# Patient Record
Sex: FEMALE | Race: WHITE | Employment: OTHER | URBAN - METROPOLITAN AREA
[De-identification: names, ages, dates, MRNs, and addresses within clinical notes are randomized per-mention and may not be internally consistent; named-entity substitution may affect disease eponyms.]

---

## 2018-08-29 ENCOUNTER — HOSPITAL ENCOUNTER (OUTPATIENT)
Age: 68
Setting detail: OUTPATIENT SURGERY
Discharge: HOME | End: 2018-08-29
Attending: INTERNAL MEDICINE | Admitting: INTERNAL MEDICINE
Payer: MEDICARE

## 2018-08-29 ENCOUNTER — ANESTHESIA (OUTPATIENT)
Dept: ENDOSCOPY | Age: 68
End: 2018-08-29
Payer: MEDICARE

## 2018-08-29 ENCOUNTER — ANESTHESIA EVENT (OUTPATIENT)
Dept: ENDOSCOPY | Age: 68
End: 2018-08-29
Payer: MEDICARE

## 2018-08-29 VITALS
OXYGEN SATURATION: 100 % | BODY MASS INDEX: 31.24 KG/M2 | HEART RATE: 56 BPM | WEIGHT: 183 LBS | SYSTOLIC BLOOD PRESSURE: 114 MMHG | TEMPERATURE: 98 F | RESPIRATION RATE: 16 BRPM | HEIGHT: 64 IN | DIASTOLIC BLOOD PRESSURE: 50 MMHG

## 2018-08-29 VITALS
RESPIRATION RATE: 17 BRPM | SYSTOLIC BLOOD PRESSURE: 119 MMHG | DIASTOLIC BLOOD PRESSURE: 52 MMHG | OXYGEN SATURATION: 98 %

## 2018-08-29 LAB
GLUCOSE BLD-MCNC: 114 MG/DL (ref 70–99)
PERFORMED ON: ABNORMAL

## 2018-08-29 PROCEDURE — 2720000010 HC SURG SUPPLY STERILE: Performed by: INTERNAL MEDICINE

## 2018-08-29 PROCEDURE — 2580000003 HC RX 258: Performed by: NURSE ANESTHETIST, CERTIFIED REGISTERED

## 2018-08-29 PROCEDURE — 3700000000 HC ANESTHESIA ATTENDED CARE: Performed by: INTERNAL MEDICINE

## 2018-08-29 PROCEDURE — 3609014100 HC ENTEROSCOPY > 2ND PRTN W/CONTROL BLEEDING: Performed by: INTERNAL MEDICINE

## 2018-08-29 PROCEDURE — 6360000002 HC RX W HCPCS: Performed by: NURSE ANESTHETIST, CERTIFIED REGISTERED

## 2018-08-29 PROCEDURE — 7100000011 HC PHASE II RECOVERY - ADDTL 15 MIN: Performed by: INTERNAL MEDICINE

## 2018-08-29 PROCEDURE — 2500000003 HC RX 250 WO HCPCS: Performed by: NURSE ANESTHETIST, CERTIFIED REGISTERED

## 2018-08-29 PROCEDURE — 7100000010 HC PHASE II RECOVERY - FIRST 15 MIN: Performed by: INTERNAL MEDICINE

## 2018-08-29 PROCEDURE — 3700000001 HC ADD 15 MINUTES (ANESTHESIA): Performed by: INTERNAL MEDICINE

## 2018-08-29 PROCEDURE — 3609013900 HC ENTEROSCOPY: Performed by: INTERNAL MEDICINE

## 2018-08-29 RX ORDER — ERGOCALCIFEROL 1.25 MG/1
50000 CAPSULE ORAL WEEKLY
COMMUNITY

## 2018-08-29 RX ORDER — SODIUM CHLORIDE, SODIUM LACTATE, POTASSIUM CHLORIDE, CALCIUM CHLORIDE 600; 310; 30; 20 MG/100ML; MG/100ML; MG/100ML; MG/100ML
INJECTION, SOLUTION INTRAVENOUS CONTINUOUS PRN
Status: DISCONTINUED | OUTPATIENT
Start: 2018-08-29 | End: 2018-08-29 | Stop reason: SDUPTHER

## 2018-08-29 RX ORDER — FENTANYL CITRATE 50 UG/ML
INJECTION, SOLUTION INTRAMUSCULAR; INTRAVENOUS PRN
Status: DISCONTINUED | OUTPATIENT
Start: 2018-08-29 | End: 2018-08-29 | Stop reason: SDUPTHER

## 2018-08-29 RX ORDER — CEPHALEXIN 250 MG/1
250 CAPSULE ORAL 4 TIMES DAILY
Qty: 28 CAPSULE | Refills: 0 | Status: SHIPPED | OUTPATIENT
Start: 2018-08-29 | End: 2018-09-05

## 2018-08-29 RX ORDER — M-VIT,TX,IRON,MINS/CALC/FOLIC 27MG-0.4MG
1 TABLET ORAL DAILY
COMMUNITY

## 2018-08-29 RX ORDER — PROBENECID AND COLCHICINE 500; .5 MG/1; MG/1
1 TABLET ORAL DAILY
COMMUNITY

## 2018-08-29 RX ORDER — LIDOCAINE HYDROCHLORIDE 20 MG/ML
INJECTION, SOLUTION INFILTRATION; PERINEURAL PRN
Status: DISCONTINUED | OUTPATIENT
Start: 2018-08-29 | End: 2018-08-29 | Stop reason: SDUPTHER

## 2018-08-29 RX ORDER — TRAZODONE HYDROCHLORIDE 50 MG/1
50 TABLET ORAL NIGHTLY
COMMUNITY

## 2018-08-29 RX ORDER — PROPOFOL 10 MG/ML
INJECTION, EMULSION INTRAVENOUS CONTINUOUS PRN
Status: DISCONTINUED | OUTPATIENT
Start: 2018-08-29 | End: 2018-08-29 | Stop reason: SDUPTHER

## 2018-08-29 RX ORDER — FUROSEMIDE 80 MG
80 TABLET ORAL 2 TIMES DAILY
COMMUNITY

## 2018-08-29 RX ORDER — FEBUXOSTAT 80 MG/1
TABLET, FILM COATED ORAL
COMMUNITY

## 2018-08-29 RX ORDER — FLUOXETINE HYDROCHLORIDE 20 MG/1
20 CAPSULE ORAL DAILY
COMMUNITY

## 2018-08-29 RX ORDER — SIMVASTATIN 10 MG
10 TABLET ORAL NIGHTLY
COMMUNITY

## 2018-08-29 RX ORDER — METOPROLOL SUCCINATE 25 MG/1
25 TABLET, EXTENDED RELEASE ORAL DAILY
COMMUNITY

## 2018-08-29 RX ORDER — METOLAZONE 2.5 MG/1
2.5 TABLET ORAL DAILY
COMMUNITY

## 2018-08-29 RX ADMIN — FENTANYL CITRATE 25 MCG: 50 INJECTION INTRAMUSCULAR; INTRAVENOUS at 11:53

## 2018-08-29 RX ADMIN — LIDOCAINE HYDROCHLORIDE 60 MG: 20 INJECTION, SOLUTION INFILTRATION; PERINEURAL at 11:53

## 2018-08-29 RX ADMIN — PROPOFOL 100 MCG/KG/MIN: 10 INJECTION, EMULSION INTRAVENOUS at 11:53

## 2018-08-29 RX ADMIN — SODIUM CHLORIDE, SODIUM LACTATE, POTASSIUM CHLORIDE, AND CALCIUM CHLORIDE: 600; 310; 30; 20 INJECTION, SOLUTION INTRAVENOUS at 11:48

## 2018-08-29 ASSESSMENT — PULMONARY FUNCTION TESTS
PIF_VALUE: 0

## 2018-08-29 ASSESSMENT — PAIN - FUNCTIONAL ASSESSMENT: PAIN_FUNCTIONAL_ASSESSMENT: 0-10

## 2018-08-29 NOTE — H&P
pillars visible  __________  Class II: Soft palate, uvula, fauces visible  ____x______   Class III: Soft palate, base of uvula visible  __________  Class IV: Hard palate only visible   __________      ASSESSMENT AND PLAN:    1. Patient is a 70 y.o. female here for enteroscopy with deep sedation  2. Procedure options, risks and benefits reviewed with patient. We specifically discussed that risks include, but are not limited to infection, bleeding, perforation, death, and missed lesions. Patient expresses understanding.

## 2018-08-29 NOTE — ANESTHESIA PRE PROCEDURE
Department of Anesthesiology  Preprocedure Note       Name:  Mariya Thayer   Age:  70 y.o.  :  1950                                          MRN:  [id number]         Date:  2018      Surgeon: Lena Darnell):  Reggie Galeas MD    Procedure: Procedure(s):  PUSH ENTEROSCOPY WITH APC, MAC ANESTHESIA    Medications prior to admission:   Prior to Admission medications    Not on File       Current medications:    No current facility-administered medications for this encounter. Allergies: Allergies   Allergen Reactions    Codeine Nausea And Vomiting       Problem List:  There is no problem list on file for this patient. Past Medical History:  No past medical history on file. Past Surgical History:  No past surgical history on file. Social History:    Social History   Substance Use Topics    Smoking status: Not on file    Smokeless tobacco: Not on file    Alcohol use Not on file                                Counseling given: Not Answered      Vital Signs (Current): There were no vitals filed for this visit. BP Readings from Last 3 Encounters:   No data found for BP       NPO Status:                                                                                 BMI:   Wt Readings from Last 3 Encounters:   No data found for Wt     There is no height or weight on file to calculate BMI.    CBC: No results found for: WBC, RBC, HGB, HCT, MCV, RDW, PLT    CMP: No results found for: NA, K, CL, CO2, BUN, CREATININE, GFRAA, AGRATIO, LABGLOM, GLUCOSE, PROT, CALCIUM, BILITOT, ALKPHOS, AST, ALT    POC Tests: No results for input(s): POCGLU, POCNA, POCK, POCCL, POCBUN, POCHEMO, POCHCT in the last 72 hours.     Coags: No results found for: PROTIME, INR, APTT    HCG (If Applicable): No results found for: PREGTESTUR, PREGSERUM, HCG, HCGQUANT     ABGs: No results found for: PHART, PO2ART, YBE5IBQ, CAI3BDI, BEART, S6RKDWUW     Type & Screen (If

## 2018-08-29 NOTE — PROGRESS NOTES
Swelling of left foot to mid calf with  Redness from bunyon and foot up to mid espinoza area. Camacho in Endoscopy aware and will inform Dr Moreno.

## 2023-05-04 ENCOUNTER — HOSPITAL ENCOUNTER (INPATIENT)
Age: 73
LOS: 3 days | Discharge: HOME | DRG: 811 | End: 2023-05-07
Attending: INTERNAL MEDICINE | Admitting: INTERNAL MEDICINE
Payer: MEDICARE

## 2023-05-04 PROCEDURE — 0DJ08ZZ INSPECTION OF UPPER INTESTINAL TRACT, VIA NATURAL OR ARTIFICIAL OPENING ENDOSCOPIC: ICD-10-PCS | Performed by: INTERNAL MEDICINE

## 2023-05-04 PROCEDURE — 2060000000 HC ICU INTERMEDIATE R&B

## 2023-05-04 ASSESSMENT — PAIN DESCRIPTION - DESCRIPTORS: DESCRIPTORS: ACHING;PENETRATING

## 2023-05-04 ASSESSMENT — PAIN DESCRIPTION - ORIENTATION: ORIENTATION: MID

## 2023-05-04 ASSESSMENT — PAIN DESCRIPTION - PAIN TYPE: TYPE: ACUTE PAIN

## 2023-05-04 ASSESSMENT — PAIN DESCRIPTION - FREQUENCY: FREQUENCY: CONTINUOUS

## 2023-05-04 ASSESSMENT — PAIN SCALES - GENERAL: PAINLEVEL_OUTOF10: 5

## 2023-05-04 ASSESSMENT — PAIN DESCRIPTION - ONSET: ONSET: ON-GOING

## 2023-05-04 ASSESSMENT — PAIN - FUNCTIONAL ASSESSMENT: PAIN_FUNCTIONAL_ASSESSMENT: PREVENTS OR INTERFERES SOME ACTIVE ACTIVITIES AND ADLS

## 2023-05-04 ASSESSMENT — PAIN DESCRIPTION - LOCATION: LOCATION: HEAD;CHEST

## 2023-05-05 ENCOUNTER — ANESTHESIA EVENT (OUTPATIENT)
Dept: ENDOSCOPY | Age: 73
End: 2023-05-05
Payer: MEDICARE

## 2023-05-05 ENCOUNTER — ANESTHESIA (OUTPATIENT)
Dept: ENDOSCOPY | Age: 73
End: 2023-05-05
Payer: MEDICARE

## 2023-05-05 PROBLEM — N18.5 CKD (CHRONIC KIDNEY DISEASE) STAGE 5, GFR LESS THAN 15 ML/MIN (HCC): Status: ACTIVE | Noted: 2023-05-05

## 2023-05-05 PROBLEM — K92.2 GASTROINTESTINAL HEMORRHAGE: Status: ACTIVE | Noted: 2023-05-05

## 2023-05-05 PROBLEM — E78.5 HLD (HYPERLIPIDEMIA): Status: ACTIVE | Noted: 2023-05-05

## 2023-05-05 PROBLEM — N18.6 ESRD (END STAGE RENAL DISEASE) (HCC): Status: ACTIVE | Noted: 2023-05-05

## 2023-05-05 PROBLEM — I10 HTN (HYPERTENSION): Status: ACTIVE | Noted: 2023-05-05

## 2023-05-05 PROBLEM — I50.30 NYHA CLASS 3 HEART FAILURE WITH PRESERVED EJECTION FRACTION (HCC): Status: ACTIVE | Noted: 2023-05-05

## 2023-05-05 PROBLEM — R07.9 CHEST PAIN: Status: ACTIVE | Noted: 2023-05-05

## 2023-05-05 PROBLEM — I25.10 CAD (CORONARY ARTERY DISEASE), NATIVE CORONARY ARTERY: Status: ACTIVE | Noted: 2023-05-05

## 2023-05-05 PROBLEM — E87.8 ELECTROLYTE IMBALANCE: Status: ACTIVE | Noted: 2023-05-05

## 2023-05-05 PROBLEM — J44.9 COPD (CHRONIC OBSTRUCTIVE PULMONARY DISEASE) (HCC): Status: ACTIVE | Noted: 2023-05-05

## 2023-05-05 LAB
ALBUMIN SERPL-MCNC: 2.9 G/DL (ref 3.4–5)
ALBUMIN/GLOB SERPL: 1.3 {RATIO} (ref 1.1–2.2)
ALP SERPL-CCNC: 109 U/L (ref 40–129)
ALT SERPL-CCNC: <5 U/L (ref 10–40)
ANION GAP SERPL CALCULATED.3IONS-SCNC: 12 MMOL/L (ref 3–16)
AST SERPL-CCNC: 11 U/L (ref 15–37)
BASOPHILS # BLD: 0 K/UL (ref 0–0.2)
BASOPHILS NFR BLD: 0.5 %
BILIRUB SERPL-MCNC: 0.3 MG/DL (ref 0–1)
BUN SERPL-MCNC: 59 MG/DL (ref 7–20)
CALCIUM SERPL-MCNC: 8.2 MG/DL (ref 8.3–10.6)
CHLORIDE SERPL-SCNC: 106 MMOL/L (ref 99–110)
CO2 SERPL-SCNC: 22 MMOL/L (ref 21–32)
CREAT SERPL-MCNC: 4.5 MG/DL (ref 0.6–1.2)
DEPRECATED RDW RBC AUTO: 18 % (ref 12.4–15.4)
EKG ATRIAL RATE: 45 BPM
EKG DIAGNOSIS: NORMAL
EKG P AXIS: 72 DEGREES
EKG P-R INTERVAL: 194 MS
EKG Q-T INTERVAL: 554 MS
EKG QRS DURATION: 142 MS
EKG QTC CALCULATION (BAZETT): 479 MS
EKG R AXIS: -60 DEGREES
EKG T AXIS: 130 DEGREES
EKG VENTRICULAR RATE: 45 BPM
EOSINOPHIL # BLD: 0.2 K/UL (ref 0–0.6)
EOSINOPHIL NFR BLD: 8.5 %
FERRITIN SERPL IA-MCNC: 38.3 NG/ML (ref 15–150)
GFR SERPLBLD CREATININE-BSD FMLA CKD-EPI: 10 ML/MIN/{1.73_M2}
GLUCOSE BLD-MCNC: 100 MG/DL (ref 70–99)
GLUCOSE BLD-MCNC: 107 MG/DL (ref 70–99)
GLUCOSE BLD-MCNC: 140 MG/DL (ref 70–99)
GLUCOSE SERPL-MCNC: 105 MG/DL (ref 70–99)
HCT VFR BLD AUTO: 25.1 % (ref 36–48)
HCT VFR BLD AUTO: 25.5 % (ref 36–48)
HCT VFR BLD AUTO: 25.8 % (ref 36–48)
HGB BLD-MCNC: 7.9 G/DL (ref 12–16)
HGB BLD-MCNC: 8 G/DL (ref 12–16)
IMMATURE RETIC FRACT: 0.45 (ref 0.21–0.37)
IRON SATN MFR SERPL: 8 % (ref 15–50)
IRON SERPL-MCNC: 22 UG/DL (ref 37–145)
LYMPHOCYTES # BLD: 0.3 K/UL (ref 1–5.1)
LYMPHOCYTES NFR BLD: 10 %
MAGNESIUM SERPL-MCNC: 2 MG/DL (ref 1.8–2.4)
MCH RBC QN AUTO: 27.7 PG (ref 26–34)
MCHC RBC AUTO-ENTMCNC: 31.2 G/DL (ref 31–36)
MCV RBC AUTO: 88.6 FL (ref 80–100)
MONOCYTES # BLD: 0.3 K/UL (ref 0–1.3)
MONOCYTES NFR BLD: 10.1 %
NEUTROPHILS # BLD: 1.8 K/UL (ref 1.7–7.7)
NEUTROPHILS NFR BLD: 70.9 %
NT-PROBNP SERPL-MCNC: ABNORMAL PG/ML (ref 0–124)
PERFORMED ON: ABNORMAL
PHOSPHATE SERPL-MCNC: 5.2 MG/DL (ref 2.5–4.9)
PLATELET # BLD AUTO: 149 K/UL (ref 135–450)
PMV BLD AUTO: 9.3 FL (ref 5–10.5)
POTASSIUM SERPL-SCNC: 4.7 MMOL/L (ref 3.5–5.1)
PROT SERPL-MCNC: 5.2 G/DL (ref 6.4–8.2)
RBC # BLD AUTO: 2.91 M/UL (ref 4–5.2)
RETICS # AUTO: 0.07 M/UL (ref 0.02–0.1)
RETICS/RBC NFR AUTO: 2.3 % (ref 0.5–2.18)
SODIUM SERPL-SCNC: 140 MMOL/L (ref 136–145)
TIBC SERPL-MCNC: 264 UG/DL (ref 260–445)
TROPONIN, HIGH SENSITIVITY: 100 NG/L (ref 0–14)
WBC # BLD AUTO: 2.6 K/UL (ref 4–11)

## 2023-05-05 PROCEDURE — 83540 ASSAY OF IRON: CPT

## 2023-05-05 PROCEDURE — 2580000003 HC RX 258: Performed by: INTERNAL MEDICINE

## 2023-05-05 PROCEDURE — 6360000002 HC RX W HCPCS

## 2023-05-05 PROCEDURE — 83880 ASSAY OF NATRIURETIC PEPTIDE: CPT

## 2023-05-05 PROCEDURE — 83735 ASSAY OF MAGNESIUM: CPT

## 2023-05-05 PROCEDURE — C9113 INJ PANTOPRAZOLE SODIUM, VIA: HCPCS

## 2023-05-05 PROCEDURE — 85018 HEMOGLOBIN: CPT

## 2023-05-05 PROCEDURE — 84100 ASSAY OF PHOSPHORUS: CPT

## 2023-05-05 PROCEDURE — 82728 ASSAY OF FERRITIN: CPT

## 2023-05-05 PROCEDURE — 93010 ELECTROCARDIOGRAM REPORT: CPT | Performed by: INTERNAL MEDICINE

## 2023-05-05 PROCEDURE — 2500000003 HC RX 250 WO HCPCS: Performed by: NURSE ANESTHETIST, CERTIFIED REGISTERED

## 2023-05-05 PROCEDURE — 2709999900 HC NON-CHARGEABLE SUPPLY: Performed by: INTERNAL MEDICINE

## 2023-05-05 PROCEDURE — 6370000000 HC RX 637 (ALT 250 FOR IP)

## 2023-05-05 PROCEDURE — 1200000000 HC SEMI PRIVATE

## 2023-05-05 PROCEDURE — 7100000010 HC PHASE II RECOVERY - FIRST 15 MIN: Performed by: INTERNAL MEDICINE

## 2023-05-05 PROCEDURE — 99223 1ST HOSP IP/OBS HIGH 75: CPT | Performed by: INTERNAL MEDICINE

## 2023-05-05 PROCEDURE — 6360000002 HC RX W HCPCS: Performed by: NURSE ANESTHETIST, CERTIFIED REGISTERED

## 2023-05-05 PROCEDURE — 85014 HEMATOCRIT: CPT

## 2023-05-05 PROCEDURE — 2720000010 HC SURG SUPPLY STERILE: Performed by: INTERNAL MEDICINE

## 2023-05-05 PROCEDURE — 83550 IRON BINDING TEST: CPT

## 2023-05-05 PROCEDURE — 7100000011 HC PHASE II RECOVERY - ADDTL 15 MIN: Performed by: INTERNAL MEDICINE

## 2023-05-05 PROCEDURE — 2580000003 HC RX 258: Performed by: NURSE ANESTHETIST, CERTIFIED REGISTERED

## 2023-05-05 PROCEDURE — 3700000000 HC ANESTHESIA ATTENDED CARE: Performed by: INTERNAL MEDICINE

## 2023-05-05 PROCEDURE — 85045 AUTOMATED RETICULOCYTE COUNT: CPT

## 2023-05-05 PROCEDURE — 84484 ASSAY OF TROPONIN QUANT: CPT

## 2023-05-05 PROCEDURE — 85025 COMPLETE CBC W/AUTO DIFF WBC: CPT

## 2023-05-05 PROCEDURE — 2580000003 HC RX 258

## 2023-05-05 PROCEDURE — 3609013000 HC EGD TRANSORAL CONTROL BLEEDING ANY METHOD: Performed by: INTERNAL MEDICINE

## 2023-05-05 PROCEDURE — 93005 ELECTROCARDIOGRAM TRACING: CPT | Performed by: INTERNAL MEDICINE

## 2023-05-05 PROCEDURE — 36415 COLL VENOUS BLD VENIPUNCTURE: CPT

## 2023-05-05 PROCEDURE — 80053 COMPREHEN METABOLIC PANEL: CPT

## 2023-05-05 RX ORDER — ALLOPURINOL 100 MG/1
100 TABLET ORAL 2 TIMES DAILY
COMMUNITY

## 2023-05-05 RX ORDER — ONDANSETRON 2 MG/ML
4 INJECTION INTRAMUSCULAR; INTRAVENOUS EVERY 6 HOURS PRN
Status: DISCONTINUED | OUTPATIENT
Start: 2023-05-05 | End: 2023-05-07 | Stop reason: HOSPADM

## 2023-05-05 RX ORDER — SODIUM CHLORIDE 0.9 % (FLUSH) 0.9 %
5-40 SYRINGE (ML) INJECTION PRN
Status: DISCONTINUED | OUTPATIENT
Start: 2023-05-05 | End: 2023-05-07 | Stop reason: HOSPADM

## 2023-05-05 RX ORDER — PROPOFOL 10 MG/ML
INJECTION, EMULSION INTRAVENOUS PRN
Status: DISCONTINUED | OUTPATIENT
Start: 2023-05-05 | End: 2023-05-05 | Stop reason: SDUPTHER

## 2023-05-05 RX ORDER — ACETAMINOPHEN 650 MG/1
650 SUPPOSITORY RECTAL EVERY 6 HOURS PRN
Status: DISCONTINUED | OUTPATIENT
Start: 2023-05-05 | End: 2023-05-07 | Stop reason: HOSPADM

## 2023-05-05 RX ORDER — TORSEMIDE 100 MG/1
100 TABLET ORAL DAILY
Status: ON HOLD | COMMUNITY
End: 2023-05-07 | Stop reason: SDUPTHER

## 2023-05-05 RX ORDER — SEVELAMER CARBONATE 800 MG/1
1 TABLET, FILM COATED ORAL
COMMUNITY

## 2023-05-05 RX ORDER — IPRATROPIUM BROMIDE AND ALBUTEROL SULFATE 2.5; .5 MG/3ML; MG/3ML
1 SOLUTION RESPIRATORY (INHALATION) EVERY 4 HOURS PRN
Status: DISCONTINUED | OUTPATIENT
Start: 2023-05-05 | End: 2023-05-07 | Stop reason: HOSPADM

## 2023-05-05 RX ORDER — TORSEMIDE 20 MG/1
20 TABLET ORAL DAILY
Status: DISCONTINUED | OUTPATIENT
Start: 2023-05-06 | End: 2023-05-07 | Stop reason: HOSPADM

## 2023-05-05 RX ORDER — GUAIFENESIN/DEXTROMETHORPHAN 100-10MG/5
5 SYRUP ORAL EVERY 4 HOURS PRN
Status: DISCONTINUED | OUTPATIENT
Start: 2023-05-05 | End: 2023-05-07 | Stop reason: HOSPADM

## 2023-05-05 RX ORDER — LIDOCAINE HYDROCHLORIDE 20 MG/ML
INJECTION, SOLUTION EPIDURAL; INFILTRATION; INTRACAUDAL; PERINEURAL PRN
Status: DISCONTINUED | OUTPATIENT
Start: 2023-05-05 | End: 2023-05-05 | Stop reason: SDUPTHER

## 2023-05-05 RX ORDER — ACETAMINOPHEN 325 MG/1
650 TABLET ORAL EVERY 6 HOURS PRN
Status: DISCONTINUED | OUTPATIENT
Start: 2023-05-05 | End: 2023-05-07 | Stop reason: HOSPADM

## 2023-05-05 RX ORDER — SODIUM CHLORIDE, SODIUM LACTATE, POTASSIUM CHLORIDE, CALCIUM CHLORIDE 600; 310; 30; 20 MG/100ML; MG/100ML; MG/100ML; MG/100ML
INJECTION, SOLUTION INTRAVENOUS ONCE
Status: COMPLETED | OUTPATIENT
Start: 2023-05-05 | End: 2023-05-05

## 2023-05-05 RX ORDER — ISOSORBIDE MONONITRATE 30 MG/1
60 TABLET, EXTENDED RELEASE ORAL DAILY
COMMUNITY

## 2023-05-05 RX ORDER — INSULIN LISPRO 100 [IU]/ML
0-4 INJECTION, SOLUTION INTRAVENOUS; SUBCUTANEOUS
Status: DISCONTINUED | OUTPATIENT
Start: 2023-05-05 | End: 2023-05-07 | Stop reason: HOSPADM

## 2023-05-05 RX ORDER — SODIUM CHLORIDE, SODIUM LACTATE, POTASSIUM CHLORIDE, CALCIUM CHLORIDE 600; 310; 30; 20 MG/100ML; MG/100ML; MG/100ML; MG/100ML
INJECTION, SOLUTION INTRAVENOUS CONTINUOUS PRN
Status: DISCONTINUED | OUTPATIENT
Start: 2023-05-05 | End: 2023-05-05 | Stop reason: SDUPTHER

## 2023-05-05 RX ORDER — ONDANSETRON 4 MG/1
4 TABLET, ORALLY DISINTEGRATING ORAL EVERY 8 HOURS PRN
Status: DISCONTINUED | OUTPATIENT
Start: 2023-05-05 | End: 2023-05-07 | Stop reason: HOSPADM

## 2023-05-05 RX ORDER — DEXTROSE MONOHYDRATE 100 MG/ML
INJECTION, SOLUTION INTRAVENOUS CONTINUOUS PRN
Status: DISCONTINUED | OUTPATIENT
Start: 2023-05-05 | End: 2023-05-07 | Stop reason: HOSPADM

## 2023-05-05 RX ORDER — SODIUM CHLORIDE 0.9 % (FLUSH) 0.9 %
5-40 SYRINGE (ML) INJECTION EVERY 12 HOURS SCHEDULED
Status: DISCONTINUED | OUTPATIENT
Start: 2023-05-05 | End: 2023-05-07 | Stop reason: HOSPADM

## 2023-05-05 RX ORDER — INSULIN LISPRO 100 [IU]/ML
0-4 INJECTION, SOLUTION INTRAVENOUS; SUBCUTANEOUS NIGHTLY
Status: DISCONTINUED | OUTPATIENT
Start: 2023-05-05 | End: 2023-05-07 | Stop reason: HOSPADM

## 2023-05-05 RX ORDER — ATORVASTATIN CALCIUM 80 MG/1
80 TABLET, FILM COATED ORAL DAILY
COMMUNITY

## 2023-05-05 RX ORDER — GLUCAGON 1 MG/ML
1 KIT INJECTION PRN
Status: DISCONTINUED | OUTPATIENT
Start: 2023-05-05 | End: 2023-05-07 | Stop reason: HOSPADM

## 2023-05-05 RX ORDER — RIVAROXABAN 2.5 MG/1
2.5 TABLET, FILM COATED ORAL 2 TIMES DAILY
COMMUNITY

## 2023-05-05 RX ORDER — HYDRALAZINE HYDROCHLORIDE 20 MG/ML
10 INJECTION INTRAMUSCULAR; INTRAVENOUS ONCE
Status: COMPLETED | OUTPATIENT
Start: 2023-05-06 | End: 2023-05-06

## 2023-05-05 RX ORDER — AMLODIPINE BESYLATE 10 MG/1
10 TABLET ORAL DAILY
COMMUNITY

## 2023-05-05 RX ORDER — ASPIRIN 81 MG/1
81 TABLET, CHEWABLE ORAL DAILY
Status: DISCONTINUED | OUTPATIENT
Start: 2023-05-05 | End: 2023-05-07 | Stop reason: HOSPADM

## 2023-05-05 RX ORDER — PANTOPRAZOLE SODIUM 40 MG/10ML
40 INJECTION, POWDER, LYOPHILIZED, FOR SOLUTION INTRAVENOUS 2 TIMES DAILY
Status: DISCONTINUED | OUTPATIENT
Start: 2023-05-05 | End: 2023-05-06

## 2023-05-05 RX ORDER — POLYETHYLENE GLYCOL 3350 17 G/17G
17 POWDER, FOR SOLUTION ORAL DAILY PRN
Status: DISCONTINUED | OUTPATIENT
Start: 2023-05-05 | End: 2023-05-07 | Stop reason: HOSPADM

## 2023-05-05 RX ORDER — SODIUM CHLORIDE 9 MG/ML
INJECTION, SOLUTION INTRAVENOUS PRN
Status: DISCONTINUED | OUTPATIENT
Start: 2023-05-05 | End: 2023-05-07 | Stop reason: HOSPADM

## 2023-05-05 RX ADMIN — SODIUM CHLORIDE, PRESERVATIVE FREE 10 ML: 5 INJECTION INTRAVENOUS at 08:49

## 2023-05-05 RX ADMIN — PROPOFOL 40 MG: 10 INJECTION, EMULSION INTRAVENOUS at 14:47

## 2023-05-05 RX ADMIN — SODIUM CHLORIDE, SODIUM LACTATE, POTASSIUM CHLORIDE, AND CALCIUM CHLORIDE: .6; .31; .03; .02 INJECTION, SOLUTION INTRAVENOUS at 14:39

## 2023-05-05 RX ADMIN — PROPOFOL 40 MG: 10 INJECTION, EMULSION INTRAVENOUS at 14:43

## 2023-05-05 RX ADMIN — GUAIFENESIN SYRUP AND DEXTROMETHORPHAN 5 ML: 100; 10 SYRUP ORAL at 23:03

## 2023-05-05 RX ADMIN — PROPOFOL 40 MG: 10 INJECTION, EMULSION INTRAVENOUS at 14:45

## 2023-05-05 RX ADMIN — PANTOPRAZOLE SODIUM 40 MG: 40 INJECTION, POWDER, FOR SOLUTION INTRAVENOUS at 08:49

## 2023-05-05 RX ADMIN — SODIUM CHLORIDE, PRESERVATIVE FREE 10 ML: 5 INJECTION INTRAVENOUS at 23:59

## 2023-05-05 RX ADMIN — LIDOCAINE HYDROCHLORIDE 60 MG: 20 INJECTION, SOLUTION EPIDURAL; INFILTRATION; INTRACAUDAL; PERINEURAL at 14:43

## 2023-05-05 RX ADMIN — PANTOPRAZOLE SODIUM 40 MG: 40 INJECTION, POWDER, FOR SOLUTION INTRAVENOUS at 21:51

## 2023-05-05 RX ADMIN — SODIUM CHLORIDE, POTASSIUM CHLORIDE, SODIUM LACTATE AND CALCIUM CHLORIDE: 600; 310; 30; 20 INJECTION, SOLUTION INTRAVENOUS at 13:57

## 2023-05-05 RX ADMIN — PROPOFOL 40 MG: 10 INJECTION, EMULSION INTRAVENOUS at 14:49

## 2023-05-05 ASSESSMENT — PAIN SCALES - GENERAL
PAINLEVEL_OUTOF10: 0

## 2023-05-05 ASSESSMENT — PAIN - FUNCTIONAL ASSESSMENT
PAIN_FUNCTIONAL_ASSESSMENT: WONG-BAKER FACES
PAIN_FUNCTIONAL_ASSESSMENT: NONE - DENIES PAIN
PAIN_FUNCTIONAL_ASSESSMENT: WONG-BAKER FACES

## 2023-05-05 ASSESSMENT — PAIN SCALES - WONG BAKER: WONGBAKER_NUMERICALRESPONSE: 0

## 2023-05-05 NOTE — ANESTHESIA POSTPROCEDURE EVALUATION
Department of Anesthesiology  Postprocedure Note    Patient: Min Medina  MRN: [id number]  YOB: 1950  Date of evaluation: 5/5/2023      Procedure Summary     Date: 05/05/23 Room / Location: [address] [place] [address] Eliud Dueñas 01 / [facility]    Anesthesia Start: 5903 Anesthesia Stop: 1452    Procedure: EGD CONTROL HEMORRHAGE Diagnosis:       Gastrointestinal hemorrhage, unspecified gastrointestinal hemorrhage type      (Gastrointestinal hemorrhage, unspecified gastrointestinal hemorrhage type [K92.2])    Surgeons: Alma Carranza MD Responsible Provider: Katheryn Moreno MD    Anesthesia Type: MAC ASA Status: 3          Anesthesia Type: No value filed.     Marquita Phase I: Marquita Score: 10    Marquita Phase II:        Anesthesia Post Evaluation    Patient location during evaluation: PACU  Level of consciousness: awake  Complications: no  Multimodal analgesia pain management approach

## 2023-05-05 NOTE — ANESTHESIA PRE PROCEDURE
Department of Anesthesiology  Preprocedure Note       Name:  Merrick Hughes   Age:  67 y.o.  :  1950                                          MRN:  [id number]         Date:  2023      Surgeon: Dia Jenkins):  Kelly Mendez MD    Procedure: Procedure(s):  PUSH ENTEROSCOPY WITH APC, MAC ANESTHESIA    Medications prior to admission:   Prior to Admission medications    Medication Sig Start Date End Date Taking? Authorizing Provider   allopurinol (ZYLOPRIM) 100 MG tablet Take 1 tablet by mouth 2 times daily    Historical Provider, MD   atorvastatin (LIPITOR) 80 MG tablet Take 1 tablet by mouth daily    Historical Provider, MD   isosorbide mononitrate (IMDUR) 30 MG extended release tablet Take 2 tablets by mouth daily    Historical Provider, MD   rivaroxaban (XARELTO) 2.5 MG TABS tablet Take 1 tablet by mouth 2 times daily    Historical Provider, MD   sevelamer (RENVELA) 800 MG tablet Take 1 tablet by mouth 3 times daily (with meals)    Historical Provider, MD   torsemide (DEMADEX) 100 MG tablet Take 1 tablet by mouth daily    Historical Provider, MD   amLODIPine (NORVASC) 10 MG tablet Take 1 tablet by mouth daily    Historical Provider, MD   aspirin 81 MG tablet Take 1 tablet by mouth daily    Historical Provider, MD   FLUoxetine (PROZAC) 40 MG capsule Take 1 capsule by mouth daily    Historical Provider, MD   Potassium Chloride Dilia ER (KLOR-CON M20 PO) Take 20 mEq by mouth daily    Historical Provider, MD   metoprolol succinate (TOPROL XL) 50 MG extended release tablet Take 1 tablet by mouth in the morning and at bedtime    Historical Provider, MD   traZODone (DESYREL) 50 MG tablet Take 1 tablet by mouth nightly    Historical Provider, MD   vitamin D (ERGOCALCIFEROL) 80471 units CAPS capsule Take 1 capsule by mouth once a week Takes on     Historical Provider, MD       Current medications:    No current facility-administered medications for this visit.      No current outpatient medications on

## 2023-05-06 ENCOUNTER — APPOINTMENT (OUTPATIENT)
Dept: GENERAL RADIOLOGY | Age: 73
DRG: 811 | End: 2023-05-06
Attending: INTERNAL MEDICINE
Payer: MEDICARE

## 2023-05-06 PROBLEM — Z95.5 H/O HEART ARTERY STENT: Status: ACTIVE | Noted: 2023-05-06

## 2023-05-06 LAB
ANION GAP SERPL CALCULATED.3IONS-SCNC: 13 MMOL/L (ref 3–16)
BASE EXCESS BLDV CALC-SCNC: -3.3 MMOL/L (ref -2–3)
BASOPHILS # BLD: 0 K/UL (ref 0–0.2)
BASOPHILS NFR BLD: 0.5 %
BUN SERPL-MCNC: 67 MG/DL (ref 7–20)
CALCIUM SERPL-MCNC: 7.7 MG/DL (ref 8.3–10.6)
CHLORIDE SERPL-SCNC: 105 MMOL/L (ref 99–110)
CO2 BLDV-SCNC: 23 MMOL/L
CO2 SERPL-SCNC: 21 MMOL/L (ref 21–32)
COHGB MFR BLDV: 1.8 % (ref 0–1.5)
CREAT SERPL-MCNC: 5.2 MG/DL (ref 0.6–1.2)
DEPRECATED RDW RBC AUTO: 18.4 % (ref 12.4–15.4)
DO-HGB MFR BLDV: 13.1 %
EOSINOPHIL # BLD: 0.2 K/UL (ref 0–0.6)
EOSINOPHIL NFR BLD: 5.3 %
GFR SERPLBLD CREATININE-BSD FMLA CKD-EPI: 8 ML/MIN/{1.73_M2}
GLUCOSE BLD-MCNC: 100 MG/DL (ref 70–99)
GLUCOSE BLD-MCNC: 117 MG/DL (ref 70–99)
GLUCOSE BLD-MCNC: 146 MG/DL (ref 70–99)
GLUCOSE BLD-MCNC: 99 MG/DL (ref 70–99)
GLUCOSE SERPL-MCNC: 117 MG/DL (ref 70–99)
HCO3 BLDV-SCNC: 22.1 MMOL/L (ref 24–28)
HCT VFR BLD AUTO: 25.2 % (ref 36–48)
HCT VFR BLD AUTO: 25.5 % (ref 36–48)
HCT VFR BLD AUTO: 27.1 % (ref 36–48)
HGB BLD-MCNC: 7.9 G/DL (ref 12–16)
HGB BLD-MCNC: 8 G/DL (ref 12–16)
HGB BLD-MCNC: 8.3 G/DL (ref 12–16)
LV EF: 55 %
LVEF MODALITY: NORMAL
LYMPHOCYTES # BLD: 0.3 K/UL (ref 1–5.1)
LYMPHOCYTES NFR BLD: 9.7 %
MAGNESIUM SERPL-MCNC: 2.1 MG/DL (ref 1.8–2.4)
MCH RBC QN AUTO: 27.7 PG (ref 26–34)
MCHC RBC AUTO-ENTMCNC: 31.8 G/DL (ref 31–36)
MCV RBC AUTO: 87.3 FL (ref 80–100)
METHGB MFR BLDV: 0.4 % (ref 0–1.5)
MONOCYTES # BLD: 0.2 K/UL (ref 0–1.3)
MONOCYTES NFR BLD: 7.3 %
NEUTROPHILS # BLD: 2.2 K/UL (ref 1.7–7.7)
NEUTROPHILS NFR BLD: 77.2 %
PCO2 BLDV: 39.9 MMHG (ref 41–51)
PERFORMED ON: ABNORMAL
PERFORMED ON: NORMAL
PH BLDV: 7.35 [PH] (ref 7.35–7.45)
PLATELET # BLD AUTO: 120 K/UL (ref 135–450)
PMV BLD AUTO: 9.1 FL (ref 5–10.5)
PO2 BLDV: 52.1 MMHG (ref 25–40)
POTASSIUM SERPL-SCNC: 4.2 MMOL/L (ref 3.5–5.1)
RBC # BLD AUTO: 2.88 M/UL (ref 4–5.2)
SAO2 % BLDV: 87 %
SODIUM SERPL-SCNC: 139 MMOL/L (ref 136–145)
TROPONIN, HIGH SENSITIVITY: 77 NG/L (ref 0–14)
TROPONIN, HIGH SENSITIVITY: 81 NG/L (ref 0–14)
TSH SERPL DL<=0.005 MIU/L-ACNC: 0.58 UIU/ML (ref 0.27–4.2)
WBC # BLD AUTO: 2.9 K/UL (ref 4–11)

## 2023-05-06 PROCEDURE — 6370000000 HC RX 637 (ALT 250 FOR IP): Performed by: INTERNAL MEDICINE

## 2023-05-06 PROCEDURE — 85025 COMPLETE CBC W/AUTO DIFF WBC: CPT

## 2023-05-06 PROCEDURE — 94761 N-INVAS EAR/PLS OXIMETRY MLT: CPT

## 2023-05-06 PROCEDURE — 71045 X-RAY EXAM CHEST 1 VIEW: CPT

## 2023-05-06 PROCEDURE — 83735 ASSAY OF MAGNESIUM: CPT

## 2023-05-06 PROCEDURE — 85018 HEMOGLOBIN: CPT

## 2023-05-06 PROCEDURE — 6370000000 HC RX 637 (ALT 250 FOR IP)

## 2023-05-06 PROCEDURE — 84484 ASSAY OF TROPONIN QUANT: CPT

## 2023-05-06 PROCEDURE — 82803 BLOOD GASES ANY COMBINATION: CPT

## 2023-05-06 PROCEDURE — 2700000000 HC OXYGEN THERAPY PER DAY

## 2023-05-06 PROCEDURE — 85014 HEMATOCRIT: CPT

## 2023-05-06 PROCEDURE — 94640 AIRWAY INHALATION TREATMENT: CPT

## 2023-05-06 PROCEDURE — 2580000003 HC RX 258: Performed by: INTERNAL MEDICINE

## 2023-05-06 PROCEDURE — C8929 TTE W OR WO FOL WCON,DOPPLER: HCPCS

## 2023-05-06 PROCEDURE — 6360000004 HC RX CONTRAST MEDICATION: Performed by: INTERNAL MEDICINE

## 2023-05-06 PROCEDURE — 36415 COLL VENOUS BLD VENIPUNCTURE: CPT

## 2023-05-06 PROCEDURE — 84443 ASSAY THYROID STIM HORMONE: CPT

## 2023-05-06 PROCEDURE — 30233N1 TRANSFUSION OF NONAUTOLOGOUS RED BLOOD CELLS INTO PERIPHERAL VEIN, PERCUTANEOUS APPROACH: ICD-10-PCS

## 2023-05-06 PROCEDURE — 99233 SBSQ HOSP IP/OBS HIGH 50: CPT | Performed by: INTERNAL MEDICINE

## 2023-05-06 PROCEDURE — 6360000002 HC RX W HCPCS

## 2023-05-06 PROCEDURE — 80048 BASIC METABOLIC PNL TOTAL CA: CPT

## 2023-05-06 PROCEDURE — 99232 SBSQ HOSP IP/OBS MODERATE 35: CPT | Performed by: NURSE PRACTITIONER

## 2023-05-06 PROCEDURE — 1200000000 HC SEMI PRIVATE

## 2023-05-06 RX ORDER — PANTOPRAZOLE SODIUM 40 MG/1
40 TABLET, DELAYED RELEASE ORAL
Status: DISCONTINUED | OUTPATIENT
Start: 2023-05-06 | End: 2023-05-07 | Stop reason: HOSPADM

## 2023-05-06 RX ORDER — TRAZODONE HYDROCHLORIDE 50 MG/1
50 TABLET ORAL NIGHTLY
Status: DISCONTINUED | OUTPATIENT
Start: 2023-05-06 | End: 2023-05-07 | Stop reason: HOSPADM

## 2023-05-06 RX ORDER — ISOSORBIDE MONONITRATE 60 MG/1
60 TABLET, EXTENDED RELEASE ORAL DAILY
Status: DISCONTINUED | OUTPATIENT
Start: 2023-05-06 | End: 2023-05-07 | Stop reason: HOSPADM

## 2023-05-06 RX ORDER — SIMETHICONE 80 MG
80 TABLET,CHEWABLE ORAL EVERY 6 HOURS PRN
Status: DISCONTINUED | OUTPATIENT
Start: 2023-05-06 | End: 2023-05-07 | Stop reason: HOSPADM

## 2023-05-06 RX ORDER — FLUOXETINE HYDROCHLORIDE 20 MG/1
40 CAPSULE ORAL DAILY
Status: DISCONTINUED | OUTPATIENT
Start: 2023-05-06 | End: 2023-05-07 | Stop reason: HOSPADM

## 2023-05-06 RX ORDER — SEVELAMER CARBONATE 800 MG/1
800 TABLET, FILM COATED ORAL
Status: DISCONTINUED | OUTPATIENT
Start: 2023-05-06 | End: 2023-05-07 | Stop reason: HOSPADM

## 2023-05-06 RX ORDER — IPRATROPIUM BROMIDE AND ALBUTEROL SULFATE 2.5; .5 MG/3ML; MG/3ML
1 SOLUTION RESPIRATORY (INHALATION) 3 TIMES DAILY
Status: DISCONTINUED | OUTPATIENT
Start: 2023-05-06 | End: 2023-05-07 | Stop reason: HOSPADM

## 2023-05-06 RX ORDER — METOPROLOL SUCCINATE 50 MG/1
50 TABLET, EXTENDED RELEASE ORAL 2 TIMES DAILY
Status: DISCONTINUED | OUTPATIENT
Start: 2023-05-06 | End: 2023-05-07 | Stop reason: HOSPADM

## 2023-05-06 RX ORDER — BENZONATATE 100 MG/1
100 CAPSULE ORAL 3 TIMES DAILY PRN
Status: DISCONTINUED | OUTPATIENT
Start: 2023-05-06 | End: 2023-05-07 | Stop reason: HOSPADM

## 2023-05-06 RX ORDER — SODIUM CHLORIDE 9 MG/ML
INJECTION, SOLUTION INTRAVENOUS PRN
Status: DISCONTINUED | OUTPATIENT
Start: 2023-05-06 | End: 2023-05-07 | Stop reason: HOSPADM

## 2023-05-06 RX ADMIN — HYDRALAZINE HYDROCHLORIDE 10 MG: 20 INJECTION INTRAMUSCULAR; INTRAVENOUS at 00:13

## 2023-05-06 RX ADMIN — ASPIRIN 81 MG: 81 TABLET, CHEWABLE ORAL at 09:35

## 2023-05-06 RX ADMIN — IPRATROPIUM BROMIDE AND ALBUTEROL SULFATE 1 AMPULE: .5; 3 SOLUTION RESPIRATORY (INHALATION) at 04:38

## 2023-05-06 RX ADMIN — METOPROLOL SUCCINATE 50 MG: 50 TABLET, EXTENDED RELEASE ORAL at 12:21

## 2023-05-06 RX ADMIN — PERFLUTREN 1.5 ML: 6.52 INJECTION, SUSPENSION INTRAVENOUS at 08:35

## 2023-05-06 RX ADMIN — IPRATROPIUM BROMIDE AND ALBUTEROL SULFATE 1 AMPULE: 2.5; .5 SOLUTION RESPIRATORY (INHALATION) at 09:36

## 2023-05-06 RX ADMIN — FLUOXETINE 40 MG: 20 CAPSULE ORAL at 12:21

## 2023-05-06 RX ADMIN — ISOSORBIDE MONONITRATE 60 MG: 60 TABLET, EXTENDED RELEASE ORAL at 12:21

## 2023-05-06 RX ADMIN — SODIUM CHLORIDE, PRESERVATIVE FREE 10 ML: 5 INJECTION INTRAVENOUS at 20:04

## 2023-05-06 RX ADMIN — IPRATROPIUM BROMIDE AND ALBUTEROL SULFATE 1 AMPULE: 2.5; .5 SOLUTION RESPIRATORY (INHALATION) at 21:12

## 2023-05-06 RX ADMIN — GUAIFENESIN SYRUP AND DEXTROMETHORPHAN 5 ML: 100; 10 SYRUP ORAL at 04:23

## 2023-05-06 RX ADMIN — IPRATROPIUM BROMIDE AND ALBUTEROL SULFATE 1 AMPULE: 2.5; .5 SOLUTION RESPIRATORY (INHALATION) at 15:10

## 2023-05-06 RX ADMIN — SODIUM CHLORIDE, PRESERVATIVE FREE 10 ML: 5 INJECTION INTRAVENOUS at 09:34

## 2023-05-06 RX ADMIN — GUAIFENESIN SYRUP AND DEXTROMETHORPHAN 5 ML: 100; 10 SYRUP ORAL at 09:34

## 2023-05-06 RX ADMIN — PANTOPRAZOLE SODIUM 40 MG: 40 TABLET, DELAYED RELEASE ORAL at 16:36

## 2023-05-06 RX ADMIN — TORSEMIDE 20 MG: 20 TABLET ORAL at 09:35

## 2023-05-06 RX ADMIN — IPRATROPIUM BROMIDE AND ALBUTEROL SULFATE 1 AMPULE: .5; 3 SOLUTION RESPIRATORY (INHALATION) at 01:50

## 2023-05-06 RX ADMIN — BENZONATATE 100 MG: 100 CAPSULE ORAL at 05:34

## 2023-05-06 RX ADMIN — METOPROLOL SUCCINATE 50 MG: 50 TABLET, EXTENDED RELEASE ORAL at 20:04

## 2023-05-06 RX ADMIN — TRAZODONE HYDROCHLORIDE 50 MG: 50 TABLET ORAL at 23:18

## 2023-05-06 RX ADMIN — PANTOPRAZOLE SODIUM 40 MG: 40 TABLET, DELAYED RELEASE ORAL at 09:35

## 2023-05-06 ASSESSMENT — PAIN SCALES - GENERAL
PAINLEVEL_OUTOF10: 0
PAINLEVEL_OUTOF10: 0

## 2023-05-06 ASSESSMENT — PAIN SCALES - WONG BAKER
WONGBAKER_NUMERICALRESPONSE: 0
WONGBAKER_NUMERICALRESPONSE: 0

## 2023-05-07 VITALS
DIASTOLIC BLOOD PRESSURE: 47 MMHG | OXYGEN SATURATION: 100 % | TEMPERATURE: 97.7 F | HEIGHT: 64 IN | BODY MASS INDEX: 26.87 KG/M2 | RESPIRATION RATE: 16 BRPM | WEIGHT: 157.41 LBS | HEART RATE: 55 BPM | SYSTOLIC BLOOD PRESSURE: 109 MMHG

## 2023-05-07 LAB
ABO + RH BLD: NORMAL
ALBUMIN SERPL-MCNC: 3.2 G/DL (ref 3.4–5)
ANION GAP SERPL CALCULATED.3IONS-SCNC: 13 MMOL/L (ref 3–16)
ANISOCYTOSIS BLD QL SMEAR: ABNORMAL
BASOPHILS # BLD: 0 K/UL (ref 0–0.2)
BASOPHILS NFR BLD: 0.5 %
BLD GP AB SCN SERPL QL: NORMAL
BLOOD BANK DISPENSE STATUS: NORMAL
BLOOD BANK PRODUCT CODE: NORMAL
BPU ID: NORMAL
BUN SERPL-MCNC: 61 MG/DL (ref 7–20)
CALCIUM SERPL-MCNC: 7.8 MG/DL (ref 8.3–10.6)
CHLORIDE SERPL-SCNC: 106 MMOL/L (ref 99–110)
CO2 SERPL-SCNC: 21 MMOL/L (ref 21–32)
CREAT SERPL-MCNC: 4.7 MG/DL (ref 0.6–1.2)
DEPRECATED RDW RBC AUTO: 18.4 % (ref 12.4–15.4)
DESCRIPTION BLOOD BANK: NORMAL
EOSINOPHIL # BLD: 0.2 K/UL (ref 0–0.6)
EOSINOPHIL NFR BLD: 5.2 %
GFR SERPLBLD CREATININE-BSD FMLA CKD-EPI: 9 ML/MIN/{1.73_M2}
GLUCOSE BLD-MCNC: 110 MG/DL (ref 70–99)
GLUCOSE BLD-MCNC: 131 MG/DL (ref 70–99)
GLUCOSE SERPL-MCNC: 113 MG/DL (ref 70–99)
HCT VFR BLD AUTO: 30.2 % (ref 36–48)
HGB BLD-MCNC: 9.6 G/DL (ref 12–16)
LYMPHOCYTES # BLD: 0.2 K/UL (ref 1–5.1)
LYMPHOCYTES NFR BLD: 8 %
MAGNESIUM SERPL-MCNC: 1.9 MG/DL (ref 1.8–2.4)
MCH RBC QN AUTO: 27.8 PG (ref 26–34)
MCHC RBC AUTO-ENTMCNC: 31.6 G/DL (ref 31–36)
MCV RBC AUTO: 87.9 FL (ref 80–100)
MONOCYTES # BLD: 0.2 K/UL (ref 0–1.3)
MONOCYTES NFR BLD: 6.4 %
NEUTROPHILS # BLD: 2.4 K/UL (ref 1.7–7.7)
NEUTROPHILS NFR BLD: 79.9 %
OVALOCYTES BLD QL SMEAR: ABNORMAL
PERFORMED ON: ABNORMAL
PERFORMED ON: ABNORMAL
PHOSPHATE SERPL-MCNC: 4.8 MG/DL (ref 2.5–4.9)
PLATELET # BLD AUTO: 120 K/UL (ref 135–450)
PLATELET BLD QL SMEAR: ABNORMAL
PMV BLD AUTO: 9.1 FL (ref 5–10.5)
POTASSIUM SERPL-SCNC: 4.4 MMOL/L (ref 3.5–5.1)
RBC # BLD AUTO: 3.44 M/UL (ref 4–5.2)
SCHISTOCYTES BLD QL SMEAR: ABNORMAL
SODIUM SERPL-SCNC: 140 MMOL/L (ref 136–145)
WBC # BLD AUTO: 3 K/UL (ref 4–11)

## 2023-05-07 PROCEDURE — 97166 OT EVAL MOD COMPLEX 45 MIN: CPT

## 2023-05-07 PROCEDURE — 83735 ASSAY OF MAGNESIUM: CPT

## 2023-05-07 PROCEDURE — 36430 TRANSFUSION BLD/BLD COMPNT: CPT

## 2023-05-07 PROCEDURE — 97162 PT EVAL MOD COMPLEX 30 MIN: CPT

## 2023-05-07 PROCEDURE — 97116 GAIT TRAINING THERAPY: CPT

## 2023-05-07 PROCEDURE — 97535 SELF CARE MNGMENT TRAINING: CPT

## 2023-05-07 PROCEDURE — 6370000000 HC RX 637 (ALT 250 FOR IP): Performed by: INTERNAL MEDICINE

## 2023-05-07 PROCEDURE — 86901 BLOOD TYPING SEROLOGIC RH(D): CPT

## 2023-05-07 PROCEDURE — 86900 BLOOD TYPING SEROLOGIC ABO: CPT

## 2023-05-07 PROCEDURE — 6360000002 HC RX W HCPCS

## 2023-05-07 PROCEDURE — P9016 RBC LEUKOCYTES REDUCED: HCPCS

## 2023-05-07 PROCEDURE — 2580000003 HC RX 258: Performed by: INTERNAL MEDICINE

## 2023-05-07 PROCEDURE — 94761 N-INVAS EAR/PLS OXIMETRY MLT: CPT

## 2023-05-07 PROCEDURE — 80048 BASIC METABOLIC PNL TOTAL CA: CPT

## 2023-05-07 PROCEDURE — 94640 AIRWAY INHALATION TREATMENT: CPT

## 2023-05-07 PROCEDURE — 97530 THERAPEUTIC ACTIVITIES: CPT

## 2023-05-07 PROCEDURE — 84100 ASSAY OF PHOSPHORUS: CPT

## 2023-05-07 PROCEDURE — 85025 COMPLETE CBC W/AUTO DIFF WBC: CPT

## 2023-05-07 PROCEDURE — 36415 COLL VENOUS BLD VENIPUNCTURE: CPT

## 2023-05-07 PROCEDURE — 99233 SBSQ HOSP IP/OBS HIGH 50: CPT | Performed by: INTERNAL MEDICINE

## 2023-05-07 PROCEDURE — 86923 COMPATIBILITY TEST ELECTRIC: CPT

## 2023-05-07 PROCEDURE — 6370000000 HC RX 637 (ALT 250 FOR IP): Performed by: STUDENT IN AN ORGANIZED HEALTH CARE EDUCATION/TRAINING PROGRAM

## 2023-05-07 PROCEDURE — 86850 RBC ANTIBODY SCREEN: CPT

## 2023-05-07 PROCEDURE — 82040 ASSAY OF SERUM ALBUMIN: CPT

## 2023-05-07 RX ORDER — TORSEMIDE 20 MG/1
80 TABLET ORAL ONCE
Status: COMPLETED | OUTPATIENT
Start: 2023-05-07 | End: 2023-05-07

## 2023-05-07 RX ORDER — HYDRALAZINE HYDROCHLORIDE 20 MG/ML
10 INJECTION INTRAMUSCULAR; INTRAVENOUS ONCE
Status: COMPLETED | OUTPATIENT
Start: 2023-05-07 | End: 2023-05-07

## 2023-05-07 RX ORDER — PANTOPRAZOLE SODIUM 40 MG/1
40 TABLET, DELAYED RELEASE ORAL
Qty: 120 TABLET | Refills: 0 | Status: SHIPPED | OUTPATIENT
Start: 2023-05-07 | End: 2023-05-07 | Stop reason: SDUPTHER

## 2023-05-07 RX ORDER — TORSEMIDE 100 MG/1
50 TABLET ORAL DAILY
Qty: 30 TABLET | Refills: 3 | Status: SHIPPED | OUTPATIENT
Start: 2023-05-07

## 2023-05-07 RX ORDER — AMLODIPINE BESYLATE 10 MG/1
10 TABLET ORAL DAILY
Status: DISCONTINUED | OUTPATIENT
Start: 2023-05-07 | End: 2023-05-07 | Stop reason: HOSPADM

## 2023-05-07 RX ORDER — TORSEMIDE 20 MG/1
20 TABLET ORAL DAILY
Qty: 30 TABLET | Refills: 3 | Status: CANCELLED | OUTPATIENT
Start: 2023-05-08

## 2023-05-07 RX ORDER — PANTOPRAZOLE SODIUM 40 MG/1
40 TABLET, DELAYED RELEASE ORAL
Qty: 120 TABLET | Refills: 0 | Status: SHIPPED | OUTPATIENT
Start: 2023-05-07 | End: 2023-07-06

## 2023-05-07 RX ADMIN — SODIUM CHLORIDE, PRESERVATIVE FREE 10 ML: 5 INJECTION INTRAVENOUS at 09:00

## 2023-05-07 RX ADMIN — TORSEMIDE 80 MG: 20 TABLET ORAL at 11:26

## 2023-05-07 RX ADMIN — AMLODIPINE BESYLATE 10 MG: 10 TABLET ORAL at 10:15

## 2023-05-07 RX ADMIN — ISOSORBIDE MONONITRATE 60 MG: 60 TABLET, EXTENDED RELEASE ORAL at 09:00

## 2023-05-07 RX ADMIN — FLUOXETINE 40 MG: 20 CAPSULE ORAL at 09:00

## 2023-05-07 RX ADMIN — HYDRALAZINE HYDROCHLORIDE 10 MG: 20 INJECTION INTRAMUSCULAR; INTRAVENOUS at 04:19

## 2023-05-07 RX ADMIN — ASPIRIN 81 MG: 81 TABLET, CHEWABLE ORAL at 09:00

## 2023-05-07 RX ADMIN — METOPROLOL SUCCINATE 50 MG: 50 TABLET, EXTENDED RELEASE ORAL at 09:00

## 2023-05-07 RX ADMIN — TORSEMIDE 20 MG: 20 TABLET ORAL at 09:00

## 2023-05-07 RX ADMIN — IPRATROPIUM BROMIDE AND ALBUTEROL SULFATE 1 AMPULE: 2.5; .5 SOLUTION RESPIRATORY (INHALATION) at 15:26

## 2023-05-07 RX ADMIN — SEVELAMER CARBONATE 800 MG: 800 TABLET, FILM COATED ORAL at 09:00

## 2023-05-07 RX ADMIN — IPRATROPIUM BROMIDE AND ALBUTEROL SULFATE 1 AMPULE: 2.5; .5 SOLUTION RESPIRATORY (INHALATION) at 08:07

## 2023-05-07 RX ADMIN — HYDRALAZINE HYDROCHLORIDE 10 MG: 20 INJECTION INTRAMUSCULAR; INTRAVENOUS at 07:15

## 2023-05-07 RX ADMIN — SEVELAMER CARBONATE 800 MG: 800 TABLET, FILM COATED ORAL at 11:26

## 2023-05-07 RX ADMIN — PANTOPRAZOLE SODIUM 40 MG: 40 TABLET, DELAYED RELEASE ORAL at 05:02

## 2023-05-07 ASSESSMENT — PAIN SCALES - GENERAL
PAINLEVEL_OUTOF10: 0

## 2023-05-07 ASSESSMENT — PAIN SCALES - WONG BAKER
WONGBAKER_NUMERICALRESPONSE: 0

## 2023-07-03 RX ORDER — PANTOPRAZOLE SODIUM 40 MG/1
TABLET, DELAYED RELEASE ORAL
Qty: 120 TABLET | Refills: 0 | OUTPATIENT
Start: 2023-07-03

## 2023-07-25 RX ORDER — PANTOPRAZOLE SODIUM 40 MG/1
TABLET, DELAYED RELEASE ORAL
Qty: 120 TABLET | Refills: 0 | OUTPATIENT
Start: 2023-07-25

## 2024-03-23 NOTE — PROGRESS NOTES
[facility]  Cornelius Woods MD, FACS, VI  [phone]    DIALYSIS CONSULTATION      PCP:  Tori Mora MD (Inactive)       Chief Complaint: ESRD    Nephrologist:  Nydia    Prior AVaccess:  left arm AV graft    Tunneled Catheters:  Right IJ    Dialysis schedule:  MWF    Hand Dominance:  Right    History of Present Illness:   Valery Ramirez is a 73 y.o. female who presents today for discussion regarding new access creation.  Patient has a history of hemodialysis for several years to a left upper arm AV graft that is disease and now receives dialysis with a right IJ tunneled dialysis catheter.  She did have partial kidney recovery in late December 2023 allowing her to be off dialysis.  She underwent vein mapping at [facility] on February 28 and is here to discuss options..       Past Medical History:  Past Medical History:   Diagnosis Date    CHF (congestive heart failure) (HCC)     COPD (chronic obstructive pulmonary disease) (HCC)     Dysphagia     GI bleed     Gout     Hyperlipidemia     Hypertension     Polyp of colon     history    Renal failure     on dialysis       Past Surgical History:  Past Surgical History:   Procedure Laterality Date    CARPAL TUNNEL RELEASE Bilateral     CHOLECYSTECTOMY      CORONARY ANGIOPLASTY WITH STENT PLACEMENT      DIALYSIS FISTULA CREATION      ENTEROSCOPY N/A 8/29/2018    ENTEROSCOPY PUSH CONTROL HEMORRHAGE performed by Tai Chavarria MD at [facility] ENDOSCOPY    TOTAL KNEE ARTHROPLASTY Bilateral     UPPER GASTROINTESTINAL ENDOSCOPY N/A 5/5/2023    EGD CONTROL HEMORRHAGE performed by Tai Chavarria MD at [facility] ENDOSCOPY    UPPP UVULOPALATOPHARYGOPLASTY         Home Medications:   Prior to Admission medications    Medication Sig Start Date End Date Taking? Authorizing Provider   torsemide (DEMADEX) 100 MG tablet Take 0.5 tablets by mouth daily 5/7/23   Florin Thomas,    pantoprazole (PROTONIX) 40 MG tablet Take 1 tablet by mouth 2 times daily (before

## 2024-03-25 PROBLEM — J20.9 ACUTE BRONCHITIS: Status: ACTIVE | Noted: 2024-03-25

## 2024-03-25 PROBLEM — J01.90 ACUTE SINUSITIS: Status: ACTIVE | Noted: 2024-03-25

## 2024-03-25 PROBLEM — M54.10 RADICULAR PAIN: Status: ACTIVE | Noted: 2024-03-25

## 2024-03-25 PROBLEM — M79.604 PAIN IN BOTH LOWER EXTREMITIES: Status: ACTIVE | Noted: 2020-10-06

## 2024-03-25 PROBLEM — K75.81 LIVER CIRRHOSIS SECONDARY TO NASH (HCC): Status: ACTIVE | Noted: 2018-07-11

## 2024-03-25 PROBLEM — N95.2 ATROPHIC VAGINITIS: Status: ACTIVE | Noted: 2024-03-25

## 2024-03-25 PROBLEM — J45.909 ASTHMATIC BRONCHITIS: Status: ACTIVE | Noted: 2024-03-25

## 2024-03-25 PROBLEM — K74.60 LIVER CIRRHOSIS SECONDARY TO NASH (HCC): Status: ACTIVE | Noted: 2018-07-11

## 2024-03-25 PROBLEM — R06.2 WHEEZING: Status: ACTIVE | Noted: 2022-04-12

## 2024-03-25 PROBLEM — M19.90 OSTEOARTHROSIS: Status: ACTIVE | Noted: 2024-03-25

## 2024-03-25 PROBLEM — H66.90 OTITIS MEDIA: Status: ACTIVE | Noted: 2024-03-25

## 2024-03-25 PROBLEM — I21.02 ACUTE ST ELEVATION MYOCARDIAL INFARCTION (STEMI) INVOLVING LEFT ANTERIOR DESCENDING CORONARY ARTERY (HCC): Status: ACTIVE | Noted: 2021-06-05

## 2024-03-25 PROBLEM — B35.4 TINEA CORPORIS: Status: ACTIVE | Noted: 2024-03-25

## 2024-03-25 PROBLEM — K85.90 PANCREATITIS: Status: ACTIVE | Noted: 2024-03-25

## 2024-03-25 PROBLEM — H60.90 OTITIS EXTERNA: Status: ACTIVE | Noted: 2024-03-25

## 2024-03-25 PROBLEM — M65.4 RADIAL STYLOID TENOSYNOVITIS: Status: ACTIVE | Noted: 2024-03-25

## 2024-03-25 PROBLEM — M51.35 DEGENERATION OF THORACOLUMBAR INTERVERTEBRAL DISC: Status: ACTIVE | Noted: 2024-03-25

## 2024-03-25 PROBLEM — I65.21 STENOSIS OF RIGHT CAROTID ARTERY: Status: ACTIVE | Noted: 2024-03-25

## 2024-03-25 PROBLEM — M79.605 PAIN IN BOTH LOWER EXTREMITIES: Status: ACTIVE | Noted: 2020-10-06

## 2024-03-25 PROBLEM — G89.29 CHRONIC PAIN: Status: ACTIVE | Noted: 2024-03-25

## 2024-03-25 PROBLEM — M79.7 FIBROMYOSITIS: Status: ACTIVE | Noted: 2024-03-25

## 2024-03-25 PROBLEM — R19.5 OCCULT BLOOD IN STOOLS: Status: ACTIVE | Noted: 2024-03-25

## 2024-03-25 PROBLEM — R42 VERTIGO: Status: ACTIVE | Noted: 2024-03-25

## 2024-03-25 PROBLEM — M19.079 OSTEOARTHRITIS OF ANKLE: Status: ACTIVE | Noted: 2024-03-25

## 2024-03-25 PROBLEM — M06.9 RHEUMATOID ARTHRITIS (HCC): Status: ACTIVE | Noted: 2024-03-25

## 2024-03-25 PROBLEM — M19.049 DEGENERATIVE JOINT DISEASE OF HAND: Status: ACTIVE | Noted: 2024-03-25

## 2024-03-25 PROBLEM — I95.89 CHRONIC HYPOTENSION: Status: ACTIVE | Noted: 2024-03-25

## 2024-03-25 PROBLEM — L03.119 CELLULITIS OF LOWER LIMB: Status: ACTIVE | Noted: 2024-03-25

## 2024-03-25 PROBLEM — K21.9 GASTROESOPHAGEAL REFLUX DISEASE WITH ULCERATION: Status: ACTIVE | Noted: 2024-03-25

## 2024-03-25 PROBLEM — N39.0 URINARY TRACT INFECTIOUS DISEASE: Status: ACTIVE | Noted: 2024-03-25

## 2024-03-25 PROBLEM — I70.209 ATHEROSCLEROSIS OF ARTERIES OF EXTREMITIES (HCC): Status: ACTIVE | Noted: 2021-10-14

## 2024-03-25 PROBLEM — M16.10 PRIMARY LOCALIZED OSTEOARTHRITIS OF PELVIC REGION AND THIGH: Status: ACTIVE | Noted: 2024-03-25

## 2024-03-25 PROBLEM — M19.049 ARTHRITIS OF HAND: Status: ACTIVE | Noted: 2024-03-25

## 2024-03-25 PROBLEM — R19.7 DIARRHEA: Status: ACTIVE | Noted: 2024-03-25

## 2024-03-25 PROBLEM — H26.9 BILATERAL CATARACTS: Status: ACTIVE | Noted: 2024-03-25

## 2024-03-25 PROBLEM — F32.A DEPRESSIVE DISORDER: Status: ACTIVE | Noted: 2024-03-25

## 2024-03-25 PROBLEM — R58 RETROPERITONEAL BLEED: Status: ACTIVE | Noted: 2022-09-12

## 2024-03-25 PROBLEM — D62 ACUTE BLOOD LOSS ANEMIA: Status: ACTIVE | Noted: 2018-07-11

## 2024-03-25 PROBLEM — G54.9 NERVE ROOT DISORDER: Status: ACTIVE | Noted: 2024-03-25

## 2024-03-25 PROBLEM — R60.9 EDEMA: Status: ACTIVE | Noted: 2024-03-25

## 2024-03-25 PROBLEM — M65.30 ACQUIRED TRIGGER FINGER: Status: ACTIVE | Noted: 2024-03-25

## 2024-03-25 PROBLEM — R91.1 SOLITARY PULMONARY NODULE: Status: ACTIVE | Noted: 2024-03-25

## 2024-03-25 RX ORDER — A/SINGAPORE/GP1908/2015 IVR-180 (AN A/MICHIGAN/45/2015 (H1N1)PDM09-LIKE VIRUS, A/HONG KONG/4801/2014, NYMC X-263B (H3N2) (AN A/HONG KONG/4801/2014-LIKE VIRUS), AND B/BRISBANE/60/2008, WILD TYPE (A B/BRISBANE/60/2008-LIKE VIRUS) 15; 15; 15 UG/.5ML; UG/.5ML; UG/.5ML
INJECTION, SUSPENSION INTRAMUSCULAR
COMMUNITY

## 2024-03-25 RX ORDER — TOBRAMYCIN AND DEXAMETHASONE 3; 1 MG/ML; MG/ML
SUSPENSION/ DROPS OPHTHALMIC
COMMUNITY

## 2024-03-25 RX ORDER — CELECOXIB 100 MG/1
CAPSULE ORAL
COMMUNITY

## 2024-03-25 RX ORDER — DEXTROMETHORPHAN HYDROBROMIDE AND PROMETHAZINE HYDROCHLORIDE 15; 6.25 MG/5ML; MG/5ML
5 SYRUP ORAL
COMMUNITY

## 2024-03-25 RX ORDER — ROPINIROLE 1 MG/1
TABLET, FILM COATED ORAL
COMMUNITY
Start: 2019-12-20

## 2024-03-25 RX ORDER — CALCITRIOL 0.25 UG/1
CAPSULE, LIQUID FILLED ORAL
COMMUNITY

## 2024-03-25 RX ORDER — CALCIUM ACETATE 667 MG/1
CAPSULE ORAL
COMMUNITY

## 2024-03-25 RX ORDER — TRAMADOL HYDROCHLORIDE 50 MG/1
TABLET ORAL
COMMUNITY

## 2024-03-25 RX ORDER — HYDROCODONE BITARTRATE AND ACETAMINOPHEN 10; 325 MG/1; MG/1
TABLET ORAL
COMMUNITY

## 2024-03-25 RX ORDER — COLCHICINE 0.6 MG/1
TABLET ORAL
COMMUNITY

## 2024-03-25 RX ORDER — METRONIDAZOLE 500 MG/1
TABLET ORAL
COMMUNITY

## 2024-03-25 RX ORDER — LOPERAMIDE HYDROCHLORIDE AND SIMETHICONE 2; 125 MG/1; MG/1
TABLET ORAL
COMMUNITY

## 2024-03-25 RX ORDER — GABAPENTIN 100 MG/1
CAPSULE ORAL
COMMUNITY

## 2024-03-25 RX ORDER — OMEPRAZOLE 20 MG/1
CAPSULE, DELAYED RELEASE ORAL
COMMUNITY

## 2024-03-25 RX ORDER — QUININE SULFATE 324 MG/1
CAPSULE ORAL
COMMUNITY

## 2024-03-25 RX ORDER — ZOSTER VACCINE RECOMBINANT, ADJUVANTED 50 MCG/0.5
KIT INTRAMUSCULAR
COMMUNITY

## 2024-03-25 RX ORDER — TRIAMCINOLONE ACETONIDE 1 MG/G
OINTMENT TOPICAL
COMMUNITY

## 2024-03-25 RX ORDER — PIOGLITAZONEHYDROCHLORIDE 30 MG/1
TABLET ORAL
COMMUNITY

## 2024-03-25 RX ORDER — OXYCODONE HYDROCHLORIDE AND ACETAMINOPHEN 5; 325 MG/1; MG/1
TABLET ORAL
COMMUNITY

## 2024-03-25 RX ORDER — ALBUTEROL SULFATE 90 UG/1
AEROSOL, METERED RESPIRATORY (INHALATION)
COMMUNITY

## 2024-03-25 RX ORDER — NITROFURANTOIN 25; 75 MG/1; MG/1
CAPSULE ORAL
COMMUNITY

## 2024-03-25 RX ORDER — NIACIN 1000 MG/1
TABLET, EXTENDED RELEASE ORAL
COMMUNITY

## 2024-03-25 RX ORDER — NAPROXEN 500 MG/1
TABLET ORAL
COMMUNITY

## 2024-03-25 RX ORDER — PROMETHAZINE HYDROCHLORIDE AND PHENYLEPHRINE HYDROCHLORIDE 6.25; 5 MG/5ML; MG/5ML
SYRUP ORAL
COMMUNITY

## 2024-03-25 RX ORDER — INDOMETHACIN 50 MG/1
SUPPOSITORY RECTAL
COMMUNITY

## 2024-03-25 RX ORDER — IPRATROPIUM BROMIDE AND ALBUTEROL SULFATE 2.5; .5 MG/3ML; MG/3ML
3 SOLUTION RESPIRATORY (INHALATION)
COMMUNITY

## 2024-03-25 RX ORDER — FERRIC CITRATE 210 MG/1
TABLET, COATED ORAL
COMMUNITY

## 2024-03-25 RX ORDER — TIZANIDINE 2 MG/1
TABLET ORAL
COMMUNITY

## 2024-03-25 RX ORDER — BUPIVACAINE HYDROCHLORIDE 5 MG/ML
0.5 INJECTION, SOLUTION PERINEURAL
COMMUNITY

## 2024-03-25 RX ORDER — SPIRONOLACTONE 100 MG/1
TABLET, FILM COATED ORAL
COMMUNITY

## 2024-03-25 RX ORDER — GLIPIZIDE 2.5 MG/1
TABLET, EXTENDED RELEASE ORAL
COMMUNITY

## 2024-03-25 RX ORDER — CLOPIDOGREL BISULFATE 75 MG/1
TABLET ORAL
COMMUNITY

## 2024-03-26 ENCOUNTER — OFFICE VISIT (OUTPATIENT)
Dept: VASCULAR SURGERY | Age: 74
End: 2024-03-26
Payer: MEDICARE

## 2024-03-26 VITALS
SYSTOLIC BLOOD PRESSURE: 146 MMHG | HEIGHT: 64 IN | WEIGHT: 151 LBS | DIASTOLIC BLOOD PRESSURE: 86 MMHG | BODY MASS INDEX: 25.78 KG/M2

## 2024-03-26 DIAGNOSIS — N18.6 ESRD (END STAGE RENAL DISEASE) (HCC): Primary | ICD-10-CM

## 2024-03-26 PROBLEM — I48.91 ATRIAL FIBRILLATION (HCC): Status: ACTIVE | Noted: 2023-10-09

## 2024-03-26 PROCEDURE — G8400 PT W/DXA NO RESULTS DOC: HCPCS | Performed by: SURGERY

## 2024-03-26 PROCEDURE — 3077F SYST BP >= 140 MM HG: CPT | Performed by: SURGERY

## 2024-03-26 PROCEDURE — G8427 DOCREV CUR MEDS BY ELIG CLIN: HCPCS | Performed by: SURGERY

## 2024-03-26 PROCEDURE — G8484 FLU IMMUNIZE NO ADMIN: HCPCS | Performed by: SURGERY

## 2024-03-26 PROCEDURE — 99204 OFFICE O/P NEW MOD 45 MIN: CPT | Performed by: SURGERY

## 2024-03-26 PROCEDURE — 4004F PT TOBACCO SCREEN RCVD TLK: CPT | Performed by: SURGERY

## 2024-03-26 PROCEDURE — 1124F ACP DISCUSS-NO DSCNMKR DOCD: CPT | Performed by: SURGERY

## 2024-03-26 PROCEDURE — 3017F COLORECTAL CA SCREEN DOC REV: CPT | Performed by: SURGERY

## 2024-03-26 PROCEDURE — G8419 CALC BMI OUT NRM PARAM NOF/U: HCPCS | Performed by: SURGERY

## 2024-03-26 PROCEDURE — 1090F PRES/ABSN URINE INCON ASSESS: CPT | Performed by: SURGERY

## 2024-03-26 PROCEDURE — 3079F DIAST BP 80-89 MM HG: CPT | Performed by: SURGERY

## 2024-03-26 RX ORDER — SIMVASTATIN 10 MG
TABLET ORAL
COMMUNITY
Start: 2021-07-07

## 2024-03-26 RX ORDER — PROMETHAZINE HYDROCHLORIDE, PHENYLEPHRINE HYDROCHLORIDE AND CODEINE PHOSPHATE 6.25; 5; 1 MG/5ML; MG/5ML; MG/5ML
SOLUTION ORAL
COMMUNITY
Start: 2013-02-13

## 2024-03-26 RX ORDER — FEBUXOSTAT 80 MG/1
TABLET, FILM COATED ORAL
COMMUNITY
Start: 2018-11-01

## 2024-03-26 RX ORDER — CEFDINIR 300 MG/1
CAPSULE ORAL
COMMUNITY

## 2024-03-26 RX ORDER — INSULIN LISPRO 100 [IU]/ML
INJECTION, SOLUTION INTRAVENOUS; SUBCUTANEOUS
COMMUNITY

## 2024-03-26 RX ORDER — FUROSEMIDE 80 MG
TABLET ORAL
COMMUNITY
Start: 2019-08-26

## 2024-03-26 RX ORDER — AMOXICILLIN AND CLAVULANATE POTASSIUM 500; 125 MG/1; MG/1
TABLET, FILM COATED ORAL
COMMUNITY

## 2024-03-26 RX ORDER — METOLAZONE 5 MG/1
TABLET ORAL
COMMUNITY

## 2024-03-26 RX ORDER — ISOSORBIDE DINITRATE 30 MG/1
1 TABLET ORAL DAILY
COMMUNITY

## 2024-03-26 RX ORDER — ASPIRIN 81 MG
TABLET, DELAYED RELEASE (ENTERIC COATED) ORAL
COMMUNITY
Start: 2024-03-11

## 2024-03-26 RX ORDER — PEG-3350, SODIUM SULFATE, SODIUM CHLORIDE, POTASSIUM CHLORIDE, SODIUM ASCORBATE AND ASCORBIC ACID 7.5-2.691G
KIT ORAL
COMMUNITY
Start: 2021-07-12

## 2024-03-26 RX ORDER — CIPROFLOXACIN AND DEXAMETHASONE 3; 1 MG/ML; MG/ML
SUSPENSION/ DROPS AURICULAR (OTIC)
COMMUNITY

## 2024-03-26 RX ORDER — PROBENECID AND COLCHICINE .5; 5 MG/1; MG/1
TABLET ORAL
COMMUNITY
Start: 2015-07-17

## 2024-03-26 RX ORDER — DICYCLOMINE HCL 20 MG
TABLET ORAL
COMMUNITY

## 2024-03-26 RX ORDER — LIDOCAINE 50 MG/G
PATCH TOPICAL
COMMUNITY
Start: 2014-12-08

## 2024-03-26 RX ORDER — AZITHROMYCIN 250 MG/1
TABLET, FILM COATED ORAL
COMMUNITY

## 2024-03-26 RX ORDER — FEBUXOSTAT 40 MG/1
1 TABLET, FILM COATED ORAL DAILY
COMMUNITY

## 2024-03-26 RX ORDER — ONDANSETRON 4 MG/1
TABLET, ORALLY DISINTEGRATING ORAL
COMMUNITY

## 2024-03-26 RX ORDER — CEPHALEXIN 500 MG/1
CAPSULE ORAL
COMMUNITY

## 2024-03-26 RX ORDER — CEFUROXIME AXETIL 500 MG/1
TABLET ORAL
COMMUNITY

## 2024-03-26 RX ORDER — BENZONATATE 100 MG/1
CAPSULE ORAL
COMMUNITY

## 2024-03-27 ENCOUNTER — TELEPHONE (OUTPATIENT)
Dept: VASCULAR SURGERY | Age: 74
End: 2024-03-27

## 2024-03-27 NOTE — TELEPHONE ENCOUNTER
Scheduled surgery with Dr Woods 5/23 arrive 7:30am , NPO after midnight, hold Xarelto 48 hours prior to surgery

## 2024-03-29 ENCOUNTER — PREP FOR PROCEDURE (OUTPATIENT)
Dept: VASCULAR SURGERY | Age: 74
End: 2024-03-29

## 2024-03-29 DIAGNOSIS — N18.6 END STAGE RENAL DISEASE (HCC): ICD-10-CM

## 2024-04-08 RX ORDER — SODIUM CHLORIDE 0.9 % (FLUSH) 0.9 %
5-40 SYRINGE (ML) INJECTION PRN
Status: CANCELLED | OUTPATIENT
Start: 2024-04-08

## 2024-04-08 RX ORDER — SODIUM CHLORIDE 9 MG/ML
INJECTION, SOLUTION INTRAVENOUS CONTINUOUS
Status: CANCELLED | OUTPATIENT
Start: 2024-04-08

## 2024-04-08 RX ORDER — SODIUM CHLORIDE 9 MG/ML
INJECTION, SOLUTION INTRAVENOUS PRN
Status: CANCELLED | OUTPATIENT
Start: 2024-04-08

## 2024-04-08 RX ORDER — SODIUM CHLORIDE 0.9 % (FLUSH) 0.9 %
5-40 SYRINGE (ML) INJECTION EVERY 12 HOURS SCHEDULED
Status: CANCELLED | OUTPATIENT
Start: 2024-04-08

## 2024-05-06 ENCOUNTER — APPOINTMENT (OUTPATIENT)
Dept: CT IMAGING | Age: 74
End: 2024-05-06
Payer: MEDICARE

## 2024-05-06 ENCOUNTER — HOSPITAL ENCOUNTER (INPATIENT)
Age: 74
LOS: 4 days | Discharge: HOME | End: 2024-05-10
Attending: EMERGENCY MEDICINE | Admitting: FAMILY MEDICINE
Payer: MEDICARE

## 2024-05-06 ENCOUNTER — APPOINTMENT (OUTPATIENT)
Dept: GENERAL RADIOLOGY | Age: 74
End: 2024-05-06
Payer: MEDICARE

## 2024-05-06 DIAGNOSIS — K92.2 UGIB (UPPER GASTROINTESTINAL BLEED): Primary | ICD-10-CM

## 2024-05-06 LAB
ABO + RH BLD: NORMAL
ALBUMIN SERPL-MCNC: 3.4 G/DL (ref 3.4–5)
ALP SERPL-CCNC: 106 U/L (ref 40–129)
ALT SERPL-CCNC: 6 U/L (ref 10–40)
ANION GAP SERPL CALCULATED.3IONS-SCNC: 17 MMOL/L (ref 3–16)
APTT BLD: 34.5 SEC (ref 22.1–36.4)
AST SERPL-CCNC: 10 U/L (ref 15–37)
BASOPHILS # BLD: 0 K/UL (ref 0–0.2)
BASOPHILS NFR BLD: 0.5 %
BILIRUB DIRECT SERPL-MCNC: <0.2 MG/DL (ref 0–0.3)
BILIRUB INDIRECT SERPL-MCNC: ABNORMAL MG/DL (ref 0–1)
BILIRUB SERPL-MCNC: <0.2 MG/DL (ref 0–1)
BLD GP AB SCN SERPL QL: NORMAL
BLOOD BANK DISPENSE STATUS: NORMAL
BLOOD BANK PRODUCT CODE: NORMAL
BPU ID: NORMAL
BUN SERPL-MCNC: 92 MG/DL (ref 7–20)
CALCIUM SERPL-MCNC: 7.7 MG/DL (ref 8.3–10.6)
CHLORIDE SERPL-SCNC: 110 MMOL/L (ref 99–110)
CO2 SERPL-SCNC: 17 MMOL/L (ref 21–32)
CREAT SERPL-MCNC: 5.8 MG/DL (ref 0.6–1.2)
DEPRECATED RDW RBC AUTO: 21.5 % (ref 12.4–15.4)
DESCRIPTION BLOOD BANK: NORMAL
EKG ATRIAL RATE: 66 BPM
EKG DIAGNOSIS: NORMAL
EKG DIAGNOSIS: NORMAL
EKG P AXIS: 88 DEGREES
EKG P-R INTERVAL: 196 MS
EKG Q-T INTERVAL: 454 MS
EKG Q-T INTERVAL: 470 MS
EKG QRS DURATION: 108 MS
EKG QRS DURATION: 112 MS
EKG QTC CALCULATION (BAZETT): 486 MS
EKG QTC CALCULATION (BAZETT): 492 MS
EKG R AXIS: -12 DEGREES
EKG R AXIS: -9 DEGREES
EKG T AXIS: 116 DEGREES
EKG T AXIS: 118 DEGREES
EKG VENTRICULAR RATE: 66 BPM
EKG VENTRICULAR RATE: 69 BPM
EOSINOPHIL # BLD: 0.1 K/UL (ref 0–0.6)
EOSINOPHIL NFR BLD: 3.6 %
GFR SERPLBLD CREATININE-BSD FMLA CKD-EPI: 7 ML/MIN/{1.73_M2}
GLUCOSE SERPL-MCNC: 76 MG/DL (ref 70–99)
HCT VFR BLD AUTO: 17.8 % (ref 36–48)
HGB BLD-MCNC: 5.5 G/DL (ref 12–16)
INR PPP: 1.52 (ref 0.85–1.15)
LIPASE SERPL-CCNC: 46 U/L (ref 13–60)
LYMPHOCYTES # BLD: 0.3 K/UL (ref 1–5.1)
LYMPHOCYTES NFR BLD: 10.7 %
MCH RBC QN AUTO: 29.8 PG (ref 26–34)
MCHC RBC AUTO-ENTMCNC: 31 G/DL (ref 31–36)
MCV RBC AUTO: 96 FL (ref 80–100)
MONOCYTES # BLD: 0.3 K/UL (ref 0–1.3)
MONOCYTES NFR BLD: 10.2 %
NEUTROPHILS # BLD: 2.3 K/UL (ref 1.7–7.7)
NEUTROPHILS NFR BLD: 75 %
NT-PROBNP SERPL-MCNC: ABNORMAL PG/ML (ref 0–449)
PLATELET # BLD AUTO: 143 K/UL (ref 135–450)
PMV BLD AUTO: 9.8 FL (ref 5–10.5)
POTASSIUM SERPL-SCNC: 4.3 MMOL/L (ref 3.5–5.1)
PROT SERPL-MCNC: 5.6 G/DL (ref 6.4–8.2)
PROTHROMBIN TIME: 18.5 SEC (ref 11.9–14.9)
RBC # BLD AUTO: 1.85 M/UL (ref 4–5.2)
SODIUM SERPL-SCNC: 144 MMOL/L (ref 136–145)
TROPONIN, HIGH SENSITIVITY: 36 NG/L (ref 0–14)
TROPONIN, HIGH SENSITIVITY: 36 NG/L (ref 0–14)
WBC # BLD AUTO: 3.1 K/UL (ref 4–11)

## 2024-05-06 PROCEDURE — 86850 RBC ANTIBODY SCREEN: CPT

## 2024-05-06 PROCEDURE — 2580000003 HC RX 258: Performed by: PHYSICIAN ASSISTANT

## 2024-05-06 PROCEDURE — 71046 X-RAY EXAM CHEST 2 VIEWS: CPT

## 2024-05-06 PROCEDURE — C9113 INJ PANTOPRAZOLE SODIUM, VIA: HCPCS | Performed by: PHYSICIAN ASSISTANT

## 2024-05-06 PROCEDURE — 6360000004 HC RX CONTRAST MEDICATION: Performed by: PHYSICIAN ASSISTANT

## 2024-05-06 PROCEDURE — 99285 EMERGENCY DEPT VISIT HI MDM: CPT

## 2024-05-06 PROCEDURE — 80048 BASIC METABOLIC PNL TOTAL CA: CPT

## 2024-05-06 PROCEDURE — 80076 HEPATIC FUNCTION PANEL: CPT

## 2024-05-06 PROCEDURE — 85730 THROMBOPLASTIN TIME PARTIAL: CPT

## 2024-05-06 PROCEDURE — 86900 BLOOD TYPING SEROLOGIC ABO: CPT

## 2024-05-06 PROCEDURE — 74174 CTA ABD&PLVS W/CONTRAST: CPT

## 2024-05-06 PROCEDURE — 83880 ASSAY OF NATRIURETIC PEPTIDE: CPT

## 2024-05-06 PROCEDURE — 93005 ELECTROCARDIOGRAM TRACING: CPT | Performed by: PHYSICIAN ASSISTANT

## 2024-05-06 PROCEDURE — 84484 ASSAY OF TROPONIN QUANT: CPT

## 2024-05-06 PROCEDURE — 96365 THER/PROPH/DIAG IV INF INIT: CPT

## 2024-05-06 PROCEDURE — 85610 PROTHROMBIN TIME: CPT

## 2024-05-06 PROCEDURE — P9016 RBC LEUKOCYTES REDUCED: HCPCS

## 2024-05-06 PROCEDURE — 86901 BLOOD TYPING SEROLOGIC RH(D): CPT

## 2024-05-06 PROCEDURE — 6360000002 HC RX W HCPCS: Performed by: PHYSICIAN ASSISTANT

## 2024-05-06 PROCEDURE — 83690 ASSAY OF LIPASE: CPT

## 2024-05-06 PROCEDURE — 99223 1ST HOSP IP/OBS HIGH 75: CPT | Performed by: FAMILY MEDICINE

## 2024-05-06 PROCEDURE — 93005 ELECTROCARDIOGRAM TRACING: CPT | Performed by: EMERGENCY MEDICINE

## 2024-05-06 PROCEDURE — 96375 TX/PRO/DX INJ NEW DRUG ADDON: CPT

## 2024-05-06 PROCEDURE — 86923 COMPATIBILITY TEST ELECTRIC: CPT

## 2024-05-06 PROCEDURE — 96376 TX/PRO/DX INJ SAME DRUG ADON: CPT

## 2024-05-06 PROCEDURE — 2060000000 HC ICU INTERMEDIATE R&B

## 2024-05-06 PROCEDURE — 85025 COMPLETE CBC W/AUTO DIFF WBC: CPT

## 2024-05-06 RX ORDER — ACETAMINOPHEN 325 MG/1
650 TABLET ORAL EVERY 6 HOURS PRN
Status: DISCONTINUED | OUTPATIENT
Start: 2024-05-06 | End: 2024-05-10 | Stop reason: HOSPADM

## 2024-05-06 RX ORDER — ONDANSETRON 2 MG/ML
4 INJECTION INTRAMUSCULAR; INTRAVENOUS ONCE
Status: COMPLETED | OUTPATIENT
Start: 2024-05-06 | End: 2024-05-06

## 2024-05-06 RX ORDER — SODIUM CHLORIDE 9 MG/ML
50 INJECTION, SOLUTION INTRAVENOUS ONCE
Status: COMPLETED | OUTPATIENT
Start: 2024-05-06 | End: 2024-05-06

## 2024-05-06 RX ORDER — CALCITRIOL 0.5 UG/1
0.5 CAPSULE, LIQUID FILLED ORAL DAILY
COMMUNITY

## 2024-05-06 RX ORDER — CILOSTAZOL 100 MG/1
100 TABLET ORAL 2 TIMES DAILY
COMMUNITY

## 2024-05-06 RX ORDER — ACETAMINOPHEN 650 MG/1
650 SUPPOSITORY RECTAL EVERY 6 HOURS PRN
Status: DISCONTINUED | OUTPATIENT
Start: 2024-05-06 | End: 2024-05-10 | Stop reason: HOSPADM

## 2024-05-06 RX ORDER — PANTOPRAZOLE SODIUM 40 MG/10ML
40 INJECTION, POWDER, LYOPHILIZED, FOR SOLUTION INTRAVENOUS ONCE
Status: COMPLETED | OUTPATIENT
Start: 2024-05-06 | End: 2024-05-06

## 2024-05-06 RX ORDER — MORPHINE SULFATE 4 MG/ML
4 INJECTION INTRAVENOUS ONCE
Status: COMPLETED | OUTPATIENT
Start: 2024-05-06 | End: 2024-05-06

## 2024-05-06 RX ORDER — SPIRONOLACTONE 50 MG/1
50 TABLET, FILM COATED ORAL DAILY
COMMUNITY

## 2024-05-06 RX ORDER — ISOSORBIDE MONONITRATE 30 MG/1
30 TABLET, EXTENDED RELEASE ORAL NIGHTLY
COMMUNITY

## 2024-05-06 RX ORDER — SODIUM CHLORIDE 9 MG/ML
INJECTION, SOLUTION INTRAVENOUS PRN
Status: DISCONTINUED | OUTPATIENT
Start: 2024-05-06 | End: 2024-05-10 | Stop reason: HOSPADM

## 2024-05-06 RX ADMIN — PROTHROMBIN, COAGULATION FACTOR VII HUMAN, COAGULATION FACTOR IX HUMAN, COAGULATION FACTOR X HUMAN, PROTEIN C, PROTEIN S HUMAN, AND WATER 2000 UNITS: KIT at 19:52

## 2024-05-06 RX ADMIN — PANTOPRAZOLE SODIUM 8 MG/HR: 40 INJECTION, POWDER, FOR SOLUTION INTRAVENOUS at 21:25

## 2024-05-06 RX ADMIN — MORPHINE SULFATE 4 MG: 4 INJECTION INTRAVENOUS at 19:45

## 2024-05-06 RX ADMIN — IOPAMIDOL 75 ML: 755 INJECTION, SOLUTION INTRAVENOUS at 19:29

## 2024-05-06 RX ADMIN — ONDANSETRON 4 MG: 2 INJECTION INTRAMUSCULAR; INTRAVENOUS at 19:46

## 2024-05-06 RX ADMIN — SODIUM CHLORIDE 50 ML: 9 INJECTION, SOLUTION INTRAVENOUS at 20:00

## 2024-05-06 RX ADMIN — PANTOPRAZOLE SODIUM 40 MG: 40 INJECTION, POWDER, FOR SOLUTION INTRAVENOUS at 19:46

## 2024-05-06 ASSESSMENT — LIFESTYLE VARIABLES
HOW OFTEN DO YOU HAVE A DRINK CONTAINING ALCOHOL: NEVER
HOW MANY STANDARD DRINKS CONTAINING ALCOHOL DO YOU HAVE ON A TYPICAL DAY: PATIENT DOES NOT DRINK

## 2024-05-06 ASSESSMENT — ENCOUNTER SYMPTOMS
EYE REDNESS: 0
NAUSEA: 0
BLOOD IN STOOL: 1
ABDOMINAL PAIN: 1
SHORTNESS OF BREATH: 1
COUGH: 1
VOMITING: 0
DIARRHEA: 1

## 2024-05-06 ASSESSMENT — PAIN SCALES - GENERAL: PAINLEVEL_OUTOF10: 10

## 2024-05-06 ASSESSMENT — PAIN DESCRIPTION - LOCATION: LOCATION: ABDOMEN

## 2024-05-06 NOTE — ED PROVIDER NOTES
[facility]  EMERGENCY DEPARTMENT ENCOUNTER          PHYSICIAN ASSISTANT NOTE       Date of evaluation: 5/6/2024    Chief Complaint     Abdominal Pain (Patient reports black stool and pain since Thursday, she is on blood thinners, dialysis patient MWF but missed Friday, she reports being short of breath today. )      History of Present Illness     Valery Ramirez is a 73 y.o. female with PMHx of CAD (most recent PCI in 9/22, previously complicated by stent restenosis and pseudoaneurysm), ESRD on HD M/W/F, COPD, hypertension, hyperlipidemia, type 2 diabetes who presents with abdominal pain, melena and shortness of breath.  Patient reports onset of her symptoms last Thursday.  She describes diffuse abdominal discomfort that is greatest in the left upper quadrant.  She reports multiple episodes of associated black tarry stool.  She states that she has worsening abdominal pain when needing to have a bowel movement.  She states that she is having greater than 10 loose bowel movements daily.  She denies any associated nausea or vomiting.  She states that she has had decreased appetite and has not eaten anything today.  She does report compliance with her medications.  She additionally reports that she has been feeling short of breath since Thursday.  She states that due to her symptoms (not being able to sit through dialysis due to diarrhea), she missed dialysis on Friday and Monday.  She denies any chest pain.  No fevers.  She does endorse a dry cough.  She does still make urine.    Chart review reveals history of GI bleed due to multiple AVMs in stomach and duodenum, angioectasias as well.  She was admitted in May 2023 and underwent an EGD that showed a 1.5 cm clean-based ulcer through the pyloric canal.  There were 5 angioectasias treated with APC with good effect.    ASSESSMENT / PLAN  (MEDICAL DECISION MAKING)     INITIAL VITALS: BP: (!) 181/62, Temp: 98 °F (36.7 °C), Pulse: 65, Respirations: 19, SpO2:  has cirrhosis with coagulopathy?      Answer:   Yes      Order Specific Question:   Select the life-threatening bleed criteria that applies to the patient:      Answer:   Require vasopressors or have evidence of end-organ failure     0.9 % sodium chloride infusion    iopamidol (ISOVUE-370) 76 % injection 75 mL    DISCONTD: pantoprazole (PROTONIX) 80 mg in sodium chloride 0.9 % 100 mL infusion    OR Linked Order Group     acetaminophen (TYLENOL) tablet 650 mg     acetaminophen (TYLENOL) suppository 650 mg    pantoprazole (PROTONIX) 40 mg in sodium chloride (PF) 0.9 % 10 mL injection       CONSULTS:  IP CONSULT TO GI  IP CONSULT TO GI  IP CONSULT TO NEPHROLOGY    Review of Systems     Review of Systems   Constitutional:  Positive for appetite change. Negative for chills and fever.   HENT:  Negative for congestion.    Eyes:  Negative for redness.   Respiratory:  Positive for cough and shortness of breath.    Cardiovascular:  Negative for chest pain.   Gastrointestinal:  Positive for abdominal pain, blood in stool and diarrhea. Negative for nausea and vomiting.   Genitourinary:  Negative for difficulty urinating.   Musculoskeletal:  Negative for gait problem.   Skin:  Negative for wound.   Neurological:  Negative for dizziness.   Psychiatric/Behavioral:  The patient is not nervous/anxious.        Past Medical, Surgical, Family, and Social History     She has a past medical history of CHF (congestive heart failure) (HCC), COPD (chronic obstructive pulmonary disease) (HCC), Dysphagia, GI bleed, Gout, Hyperlipidemia, Hypertension, Polyp of colon, and Renal failure.  She has a past surgical history that includes Dialysis fistula creation; Total knee arthroplasty (Bilateral); Carpal tunnel release (Bilateral); Cholecystectomy; UPPP; Coronary angioplasty with stent; Enteroscopy (N/A, 8/29/2018); and Upper gastrointestinal endoscopy (N/A, 5/5/2023).  Her family history is not on file.  She reports that she has been smoking

## 2024-05-07 ENCOUNTER — APPOINTMENT (OUTPATIENT)
Dept: CT IMAGING | Age: 74
End: 2024-05-07
Payer: MEDICARE

## 2024-05-07 LAB
ALBUMIN SERPL-MCNC: 3.2 G/DL (ref 3.4–5)
ALBUMIN/GLOB SERPL: 1.4 {RATIO} (ref 1.1–2.2)
ALP SERPL-CCNC: 102 U/L (ref 40–129)
ALT SERPL-CCNC: 6 U/L (ref 10–40)
ANION GAP SERPL CALCULATED.3IONS-SCNC: 13 MMOL/L (ref 3–16)
APTT BLD: 31.4 SEC (ref 22.1–36.4)
AST SERPL-CCNC: 11 U/L (ref 15–37)
BACTERIA URNS QL MICRO: ABNORMAL /HPF
BASOPHILS # BLD: 0 K/UL (ref 0–0.2)
BASOPHILS NFR BLD: 1 %
BILIRUB SERPL-MCNC: 0.3 MG/DL (ref 0–1)
BILIRUB UR QL STRIP.AUTO: NEGATIVE
BLOOD BANK DISPENSE STATUS: NORMAL
BLOOD BANK PRODUCT CODE: NORMAL
BPU ID: NORMAL
BUN SERPL-MCNC: 88 MG/DL (ref 7–20)
CALCIUM SERPL-MCNC: 7.8 MG/DL (ref 8.3–10.6)
CHLORIDE SERPL-SCNC: 109 MMOL/L (ref 99–110)
CLARITY UR: CLEAR
CO2 SERPL-SCNC: 18 MMOL/L (ref 21–32)
COLOR UR: YELLOW
CREAT SERPL-MCNC: 5.6 MG/DL (ref 0.6–1.2)
DEPRECATED RDW RBC AUTO: 20.6 % (ref 12.4–15.4)
DESCRIPTION BLOOD BANK: NORMAL
EOSINOPHIL # BLD: 0.1 K/UL (ref 0–0.6)
EOSINOPHIL NFR BLD: 3.7 %
EPI CELLS #/AREA URNS HPF: ABNORMAL /HPF (ref 0–5)
GFR SERPLBLD CREATININE-BSD FMLA CKD-EPI: 7 ML/MIN/{1.73_M2}
GLUCOSE BLD-MCNC: 104 MG/DL (ref 70–99)
GLUCOSE BLD-MCNC: 122 MG/DL (ref 70–99)
GLUCOSE BLD-MCNC: 131 MG/DL (ref 70–99)
GLUCOSE BLD-MCNC: 85 MG/DL (ref 70–99)
GLUCOSE SERPL-MCNC: 93 MG/DL (ref 70–99)
GLUCOSE UR STRIP.AUTO-MCNC: NEGATIVE MG/DL
HCT VFR BLD AUTO: 19.1 % (ref 36–48)
HCT VFR BLD AUTO: 20 % (ref 36–48)
HCT VFR BLD AUTO: 20.7 % (ref 36–48)
HCT VFR BLD AUTO: 20.7 % (ref 36–48)
HGB BLD-MCNC: 6.1 G/DL (ref 12–16)
HGB BLD-MCNC: 6.2 G/DL (ref 12–16)
HGB BLD-MCNC: 6.6 G/DL (ref 12–16)
HGB BLD-MCNC: 6.6 G/DL (ref 12–16)
HGB UR QL STRIP.AUTO: NEGATIVE
INR PPP: 1.13 (ref 0.85–1.15)
IRON SATN MFR SERPL: 8 % (ref 15–50)
IRON SERPL-MCNC: 28 UG/DL (ref 37–145)
KETONES UR STRIP.AUTO-MCNC: NEGATIVE MG/DL
LEUKOCYTE ESTERASE UR QL STRIP.AUTO: ABNORMAL
LYMPHOCYTES # BLD: 0.3 K/UL (ref 1–5.1)
LYMPHOCYTES NFR BLD: 11.2 %
MCH RBC QN AUTO: 30 PG (ref 26–34)
MCHC RBC AUTO-ENTMCNC: 31.3 G/DL (ref 31–36)
MCV RBC AUTO: 96.1 FL (ref 80–100)
MONOCYTES # BLD: 0.3 K/UL (ref 0–1.3)
MONOCYTES NFR BLD: 9.7 %
NEUTROPHILS # BLD: 2.1 K/UL (ref 1.7–7.7)
NEUTROPHILS NFR BLD: 74.4 %
NITRITE UR QL STRIP.AUTO: NEGATIVE
PERFORMED ON: ABNORMAL
PERFORMED ON: NORMAL
PH UR STRIP.AUTO: 6 [PH] (ref 5–8)
PLATELET # BLD AUTO: 124 K/UL (ref 135–450)
PMV BLD AUTO: 9.5 FL (ref 5–10.5)
POTASSIUM SERPL-SCNC: 4.1 MMOL/L (ref 3.5–5.1)
PROT SERPL-MCNC: 5.5 G/DL (ref 6.4–8.2)
PROT UR STRIP.AUTO-MCNC: 30 MG/DL
PROTHROMBIN TIME: 14.8 SEC (ref 11.9–14.9)
RBC # BLD AUTO: 2.08 M/UL (ref 4–5.2)
RBC #/AREA URNS HPF: ABNORMAL /HPF (ref 0–4)
SODIUM SERPL-SCNC: 140 MMOL/L (ref 136–145)
SP GR UR STRIP.AUTO: 1.02 (ref 1–1.03)
TIBC SERPL-MCNC: 334 UG/DL (ref 260–445)
UA COMPLETE W REFLEX CULTURE PNL UR: ABNORMAL
UA DIPSTICK W REFLEX MICRO PNL UR: YES
URN SPEC COLLECT METH UR: ABNORMAL
UROBILINOGEN UR STRIP-ACNC: 0.2 E.U./DL
WBC # BLD AUTO: 2.9 K/UL (ref 4–11)
WBC #/AREA URNS HPF: ABNORMAL /HPF (ref 0–5)

## 2024-05-07 PROCEDURE — 36415 COLL VENOUS BLD VENIPUNCTURE: CPT

## 2024-05-07 PROCEDURE — 2580000003 HC RX 258: Performed by: FAMILY MEDICINE

## 2024-05-07 PROCEDURE — 5A1D70Z PERFORMANCE OF URINARY FILTRATION, INTERMITTENT, LESS THAN 6 HOURS PER DAY: ICD-10-PCS | Performed by: INTERNAL MEDICINE

## 2024-05-07 PROCEDURE — 85025 COMPLETE CBC W/AUTO DIFF WBC: CPT

## 2024-05-07 PROCEDURE — 90935 HEMODIALYSIS ONE EVALUATION: CPT | Performed by: INTERNAL MEDICINE

## 2024-05-07 PROCEDURE — 90935 HEMODIALYSIS ONE EVALUATION: CPT

## 2024-05-07 PROCEDURE — 81001 URINALYSIS AUTO W/SCOPE: CPT

## 2024-05-07 PROCEDURE — 83550 IRON BINDING TEST: CPT

## 2024-05-07 PROCEDURE — A4216 STERILE WATER/SALINE, 10 ML: HCPCS | Performed by: FAMILY MEDICINE

## 2024-05-07 PROCEDURE — 80053 COMPREHEN METABOLIC PANEL: CPT

## 2024-05-07 PROCEDURE — 93005 ELECTROCARDIOGRAM TRACING: CPT | Performed by: FAMILY MEDICINE

## 2024-05-07 PROCEDURE — 85730 THROMBOPLASTIN TIME PARTIAL: CPT

## 2024-05-07 PROCEDURE — 2060000000 HC ICU INTERMEDIATE R&B

## 2024-05-07 PROCEDURE — 86706 HEP B SURFACE ANTIBODY: CPT

## 2024-05-07 PROCEDURE — 85014 HEMATOCRIT: CPT

## 2024-05-07 PROCEDURE — 36430 TRANSFUSION BLD/BLD COMPNT: CPT

## 2024-05-07 PROCEDURE — 87340 HEPATITIS B SURFACE AG IA: CPT

## 2024-05-07 PROCEDURE — C9113 INJ PANTOPRAZOLE SODIUM, VIA: HCPCS | Performed by: FAMILY MEDICINE

## 2024-05-07 PROCEDURE — 71250 CT THORAX DX C-: CPT

## 2024-05-07 PROCEDURE — 83540 ASSAY OF IRON: CPT

## 2024-05-07 PROCEDURE — 99223 1ST HOSP IP/OBS HIGH 75: CPT | Performed by: INTERNAL MEDICINE

## 2024-05-07 PROCEDURE — 6360000002 HC RX W HCPCS: Performed by: FAMILY MEDICINE

## 2024-05-07 PROCEDURE — 85018 HEMOGLOBIN: CPT

## 2024-05-07 PROCEDURE — 6370000000 HC RX 637 (ALT 250 FOR IP): Performed by: FAMILY MEDICINE

## 2024-05-07 PROCEDURE — 85610 PROTHROMBIN TIME: CPT

## 2024-05-07 RX ORDER — INSULIN LISPRO 100 [IU]/ML
0-4 INJECTION, SOLUTION INTRAVENOUS; SUBCUTANEOUS
Status: DISCONTINUED | OUTPATIENT
Start: 2024-05-07 | End: 2024-05-10 | Stop reason: HOSPADM

## 2024-05-07 RX ORDER — DEXTROSE MONOHYDRATE 100 MG/ML
INJECTION, SOLUTION INTRAVENOUS CONTINUOUS PRN
Status: DISCONTINUED | OUTPATIENT
Start: 2024-05-07 | End: 2024-05-10 | Stop reason: HOSPADM

## 2024-05-07 RX ORDER — HYDRALAZINE HYDROCHLORIDE 20 MG/ML
5 INJECTION INTRAMUSCULAR; INTRAVENOUS EVERY 6 HOURS PRN
Status: DISCONTINUED | OUTPATIENT
Start: 2024-05-07 | End: 2024-05-10 | Stop reason: HOSPADM

## 2024-05-07 RX ORDER — AMLODIPINE BESYLATE 5 MG/1
5 TABLET ORAL DAILY
Status: DISCONTINUED | OUTPATIENT
Start: 2024-05-07 | End: 2024-05-09

## 2024-05-07 RX ORDER — SODIUM CHLORIDE 9 MG/ML
INJECTION, SOLUTION INTRAVENOUS PRN
Status: DISCONTINUED | OUTPATIENT
Start: 2024-05-07 | End: 2024-05-10 | Stop reason: HOSPADM

## 2024-05-07 RX ORDER — INSULIN LISPRO 100 [IU]/ML
0-4 INJECTION, SOLUTION INTRAVENOUS; SUBCUTANEOUS NIGHTLY
Status: DISCONTINUED | OUTPATIENT
Start: 2024-05-07 | End: 2024-05-10 | Stop reason: HOSPADM

## 2024-05-07 RX ORDER — ATORVASTATIN CALCIUM 80 MG/1
80 TABLET, FILM COATED ORAL DAILY
Status: DISCONTINUED | OUTPATIENT
Start: 2024-05-07 | End: 2024-05-10 | Stop reason: HOSPADM

## 2024-05-07 RX ORDER — HYDROMORPHONE HYDROCHLORIDE 1 MG/ML
1 INJECTION, SOLUTION INTRAMUSCULAR; INTRAVENOUS; SUBCUTANEOUS EVERY 4 HOURS PRN
Status: DISCONTINUED | OUTPATIENT
Start: 2024-05-07 | End: 2024-05-10 | Stop reason: HOSPADM

## 2024-05-07 RX ORDER — GLUCAGON 1 MG/ML
1 KIT INJECTION PRN
Status: DISCONTINUED | OUTPATIENT
Start: 2024-05-07 | End: 2024-05-10 | Stop reason: HOSPADM

## 2024-05-07 RX ADMIN — HYDRALAZINE HYDROCHLORIDE 5 MG: 20 INJECTION INTRAMUSCULAR; INTRAVENOUS at 21:55

## 2024-05-07 RX ADMIN — SODIUM CHLORIDE, PRESERVATIVE FREE 40 MG: 5 INJECTION INTRAVENOUS at 09:58

## 2024-05-07 RX ADMIN — HYDRALAZINE HYDROCHLORIDE 5 MG: 20 INJECTION INTRAMUSCULAR; INTRAVENOUS at 03:42

## 2024-05-07 RX ADMIN — HYDROMORPHONE HYDROCHLORIDE 1 MG: 1 INJECTION, SOLUTION INTRAMUSCULAR; INTRAVENOUS; SUBCUTANEOUS at 03:42

## 2024-05-07 RX ADMIN — HYDROMORPHONE HYDROCHLORIDE 1 MG: 1 INJECTION, SOLUTION INTRAMUSCULAR; INTRAVENOUS; SUBCUTANEOUS at 21:55

## 2024-05-07 RX ADMIN — HYDRALAZINE HYDROCHLORIDE 5 MG: 20 INJECTION INTRAMUSCULAR; INTRAVENOUS at 16:04

## 2024-05-07 RX ADMIN — AMLODIPINE BESYLATE 5 MG: 5 TABLET ORAL at 16:03

## 2024-05-07 RX ADMIN — ATORVASTATIN CALCIUM 80 MG: 80 TABLET, FILM COATED ORAL at 16:04

## 2024-05-07 RX ADMIN — SODIUM CHLORIDE, PRESERVATIVE FREE 40 MG: 5 INJECTION INTRAVENOUS at 23:29

## 2024-05-07 ASSESSMENT — PAIN - FUNCTIONAL ASSESSMENT
PAIN_FUNCTIONAL_ASSESSMENT: ACTIVITIES ARE NOT PREVENTED

## 2024-05-07 ASSESSMENT — PAIN DESCRIPTION - ONSET
ONSET: ON-GOING

## 2024-05-07 ASSESSMENT — PAIN SCALES - GENERAL
PAINLEVEL_OUTOF10: 9
PAINLEVEL_OUTOF10: 0
PAINLEVEL_OUTOF10: 5
PAINLEVEL_OUTOF10: 6
PAINLEVEL_OUTOF10: 3
PAINLEVEL_OUTOF10: 7
PAINLEVEL_OUTOF10: 0

## 2024-05-07 ASSESSMENT — PAIN DESCRIPTION - PAIN TYPE
TYPE: ACUTE PAIN

## 2024-05-07 ASSESSMENT — PAIN DESCRIPTION - ORIENTATION
ORIENTATION: RIGHT;LEFT;UPPER;LOWER
ORIENTATION: RIGHT
ORIENTATION: RIGHT;LOWER

## 2024-05-07 ASSESSMENT — PAIN DESCRIPTION - DESCRIPTORS
DESCRIPTORS: SHARP
DESCRIPTORS: SHARP
DESCRIPTORS: ACHING;THROBBING;SORE

## 2024-05-07 ASSESSMENT — PAIN DESCRIPTION - LOCATION
LOCATION: HEAD;ARM
LOCATION: ARM
LOCATION: ABDOMEN

## 2024-05-07 ASSESSMENT — PAIN DESCRIPTION - FREQUENCY
FREQUENCY: INTERMITTENT

## 2024-05-07 NOTE — PROGRESS NOTES
Comprehensive Nutrition Assessment    RECOMMENDATIONS:  PO Diet: Continue NP0:adv per MD  ONS: begin with diet advancement  Nutrition Education: No recommendation at this time     NUTRITION ASSESSMENT:   Nutritional summary & status: Positive nutrition screen.Pt admitted with c/o's abd pain, black tarry stools with possible GI Bleed. Reports of decreased appetite and weight loss pta. ESRD with HD MWF; missed dialysis M/F. NPO at present. Pt off unit at time of attempted visit. Minimal wt hx per EMR, however, no wt loss noted. K+ wnl. No phos avail, will order. Would benefit from ONS for added nutrition 2/2 dialysis. Will monitor for diet advancement and begin Nepro suppelment. Continue to follow.   Admission // PMH: GI bleed//CHF, COPD, dysphagia, Gout, HLD, HTN, ESRD w/HD    MALNUTRITION ASSESSMENT  Context of Malnutrition: Acute Illness   Malnutrition Status: At risk for malnutrition (Comment)  Findings of the 6 clinical characteristics of malnutrition (Minimum of 2 out of 6 clinical characteristics is required to make the diagnosis of moderate or severe Protein Calorie Malnutrition based on AND/ASPEN Guidelines):  Energy Intake:  Mild decrease in energy intake (Comment)  Weight Loss:  No significant weight loss     Body Fat Loss:  Unable to assess (pt off unit)     Muscle Mass Loss:  Unable to assess    Fluid Accumulation:        Strength:       NUTRITION DIAGNOSIS   Inadequate oral intake related to altered GI function as evidenced by NPO or clear liquid status due to medical condition    Nutrition Monitoring and Evaluation:   Food/Nutrient Intake Outcomes:  Diet Advancement/Tolerance  Physical Signs/Symptoms Outcomes:  Biochemical Data, Nutrition Focused Physical Findings, Weight     OBJECTIVE DATA: Significant to nutrition assessment  Nutrition Related Findings: Glu 122  Wounds: Pressure Injury (non blanchable redness on coccyx)  Nutrition Goals: Initiate PO diet, by next RD assessment     CURRENT  NUTRITION THERAPIES  Diet NPO Exceptions are: Ice Chips  PO Intake: NPO   PO Supplement Intake:NPO  Additional Sources of Calories/IVF:n/a     COMPARATIVE STANDARDS  Energy (kcal):  9173-9406 (20-25 kcal/CBW)     Protein (g):  70-81 (1.3-1.5 gm/IBW)       Fluid (ml/day):  1 ml/kcal or per MD    ANTHROPOMETRICS  Current Height: 162.6 cm (5' 4\")  Current Weight - Scale: 69.7 kg (153 lb 10.6 oz)    Admission weight: 72.3 kg (159 lb 4.8 oz)    The patient will be monitored per nutrition standards of care. Consult dietitian if additional nutrition interventions are needed prior to RD reassessment.     David Willett RD  [place]:  [phone]  Office:  [phone]

## 2024-05-07 NOTE — PROGRESS NOTES
4 Eyes Skin Assessment     NAME:  Valery Ramirez  YOB: 1950  MEDICAL RECORD NUMBER:  [id number]    The patient is being assessed for  Admission    I agree that at least one RN has performed a thorough Head to Toe Skin Assessment on the patient. ALL assessment sites listed below have been assessed.      Areas assessed by both nurses:    Head, Face, Ears, Shoulders, Back, Chest, Arms, Elbows, Hands, Sacrum. Buttock, Coccyx, Ischium, Legs. Feet and Heels, and Under Medical Devices         Does the Patient have a Wound? Yes wound(s) were present on assessment. LDA wound assessment was Initiated and completed by RN    Stage 1, non-blanchable redness to coccyx.  Surrounding skin with hyperpigmentation/discoloration  Scattered bruising  Dry flaky skin         Jeremiah Prevention initiated by RN: Yes  Wound Care Orders initiated by RN: No    Pressure Injury (Stage 3,4, Unstageable, DTI, NWPT, and Complex wounds) if present, place Wound referral order by RN under : No    New Ostomies, if present place, Ostomy referral order under : No     Nurse 1 eSignature: Electronically signed by Aedlso Mott RN on 5/7/24 at 1:08 AM EDT    **SHARE this note so that the co-signing nurse can place an eSignature**    Nurse 2 eSignature: Electronically signed by Braden Eisenberg RN on 5/6/24 at 11:17 PM EDT

## 2024-05-07 NOTE — PROGRESS NOTES
H/H 6.6/20.7, unchanged.notified Dr. Washington was notified via perfect serve. Awaiting response.

## 2024-05-07 NOTE — H&P
4 6 HOURS  Patient not taking: Reported on 5/6/2024    Antonino Read MD   PEG-KCl-NaCl-NaSulf-Na Asc-C (MOVIPREP) 100 g solution MoviPrep 100GM, 1 (one) Milliliter once # 1, 07/12/2021, No Refill. Active Oral once for 1  Patient not taking: Reported on 5/6/2024 7/12/21   Antonino Read MD   promethazine-phenyleph-codeine (PHENERGAN VC W/CODEINE) 6.25-5-10 MG/5ML SYRP syrup Take 5 mL every 4 hours by oral route.  Patient not taking: Reported on 5/6/2024 2/13/13   Antonino Read MD   simvastatin (ZOCOR) 10 MG tablet Zocor( 10MG Oral 1 daily ) Active -Hx Entry Oral daily for 0  Patient not taking: Reported on 5/6/2024 7/7/21   Antonino Read MD   SITagliptin (JANUVIA) 50 MG tablet Januvia( 50MG Oral 1 daily ) Active -Hx Entry Oral daily for 0  Patient not taking: Reported on 5/6/2024 11/1/18   Antonino Read MD   albuterol sulfate HFA (PROVENTIL;VENTOLIN;PROAIR) 108 (90 Base) MCG/ACT inhaler albuterol sulfate HFA 90 mcg/actuation aerosol inhaler   Inhale 2 puffs every 4-6 hours by inhalation route as needed.  Patient not taking: Reported on 5/6/2024    Antonino Read MD   BUPivacaine (MARCAINE) 0.5 % injection 0.5 mLs  Patient not taking: Reported on 5/6/2024    Antonino Read MD   calcium acetate (PHOSLO) 667 MG CAPS capsule calcium acetate(phosphate binders) 667 mg capsule   TAKE 1 CAPSULE BY MOUTH THREE TIMES DAILY WITH MEALS  Patient not taking: Reported on 5/6/2024    Antonino Read MD   celecoxib (CELEBREX) 100 MG capsule Celebrex 100 mg capsule   TAKE 1 CAPSULE BY MOUTH 2 TO 3 TIMES A WEEK AS NEEDED  Patient not taking: Reported on 5/6/2024    Antonino Read MD   clopidogrel (PLAVIX) 75 MG tablet clopidogrel 75 mg tablet   1 daily    Antonino Read MD   colchicine (COLCRYS) 0.6 MG tablet colchicine 0.6 mg tablet   1 daily prn  Patient not taking: Reported on 5/6/2024    Provider, MD Antonino   ferric citrate (AURYXIA) 1  MG(Fe) TABS  daily    Antonino Read MD   Potassium Chloride Dilia ER (KLOR-CON M20 PO) Take 20 mEq by mouth daily  Patient not taking: Reported on 5/6/2024    Antonino Read MD   metoprolol succinate (TOPROL XL) 50 MG extended release tablet Take 1 tablet by mouth in the morning and at bedtime    Antonino Read MD   traZODone (DESYREL) 50 MG tablet Take 1 tablet by mouth nightly    Antonino Read MD   vitamin D (ERGOCALCIFEROL) 89880 units CAPS capsule Take 1 capsule by mouth once a week Takes on Sunday  Patient not taking: Reported on 5/6/2024    Antonino Read MD       Labs: Personally reviewed and interpreted for clinical significance.   Recent Labs     05/06/24 1818 05/07/24  0029   WBC 3.1*  --    HGB 5.5* 6.1*   HCT 17.8* 19.1*     --      Recent Labs     05/06/24 1818      K 4.3      CO2 17*   BUN 92*   CREATININE 5.8*   CALCIUM 7.7*     Recent Labs     05/06/24 1818 05/06/24  1955   PROBNP 10,278*  --    TROPHS 36* 36*     No results for input(s): \"LABA1C\" in the last 72 hours.  Recent Labs     05/06/24 1818   AST 10*   ALT 6*   BILIDIR <0.2   BILITOT <0.2   ALKPHOS 106     Recent Labs     05/06/24 1818   INR 1.52*        Reymundo Farooq MD

## 2024-05-07 NOTE — ED NOTES
Abnormal; Notable for the following components:    Pro-BNP 10,278 (*)     All other components within normal limits    Narrative:     CALL  [employer] tel. [phone],  Chemistry results called to and read back by YADIRA GARCIA, 05/06/2024 19:15, by  DEBRA   BASIC METABOLIC PANEL W/ REFLEX TO MG FOR LOW K - Abnormal; Notable for the following components:    CO2 17 (*)     Anion Gap 17 (*)     BUN 92 (*)     Creatinine 5.8 (*)     Est, Glom Filt Rate 7 (*)     Calcium 7.7 (*)     All other components within normal limits    Narrative:     CALL  [employer] tel. [phone],  Chemistry results called to and read back by YADIRA GARCIA, 05/06/2024 19:15, by  DEBRA   HEPATIC FUNCTION PANEL - Abnormal; Notable for the following components:    Total Protein 5.6 (*)     ALT 6 (*)     AST 10 (*)     All other components within normal limits    Narrative:     CALL  [employer] tel. [phone],  Chemistry results called to and read back by YADIRA GARCIA, 05/06/2024 19:15, by  DEBRA   TROPONIN - Abnormal; Notable for the following components:    Troponin, High Sensitivity 36 (*)     All other components within normal limits    Narrative:     CALL  [employer] tel. [phone],  Chemistry results called to and read back by YADIRA GARCIA, 05/06/2024 19:15, by  DEBRA   TROPONIN - Abnormal; Notable for the following components:    Troponin, High Sensitivity 36 (*)     All other components within normal limits   PROTIME-INR - Abnormal; Notable for the following components:    Protime 18.5 (*)     INR 1.52 (*)     All other components within normal limits       Background  History:   Past Medical History:   Diagnosis Date    CHF (congestive heart failure) (HCC)     COPD (chronic obstructive pulmonary disease) (HCC)     Dysphagia     GI bleed     Gout     Hyperlipidemia     Hypertension     Polyp of colon     history    Renal failure     on dialysis       Assessment    Vitals:    Level of Consciousness: Alert (0)   Vitals:    05/06/24 2015 05/06/24 2029  05/06/24 2030 05/06/24 2045   BP: (!) 167/62 (!) 149/54 (!) 161/48 (!) 157/54   Pulse: 69 70 69 68   Resp: 18 13 23 17   Temp:  98.4 °F (36.9 °C)     TempSrc:  Oral     SpO2: 98% 98% 98% 98%   Weight:       Height:         PO Status: Nothing by Mouth  O2 Flow Rate: O2 Device: Nasal cannula O2 Flow Rate (L/min): 2 L/min  Cardiac Rhythm: NSR  Last documented pain medication administered: 1930  NIH Score: NIH     Active LDA's:   Peripheral IV 05/06/24 Right Forearm (Active)       Peripheral IV 05/06/24 Proximal;Right Forearm (Active)       Pertinent or High Risk Medications/Drips: no   If Yes, please provide details: N/A  Blood Product Administration: yes  If Yes, please provide details: 1 UNIT PRBC    Recommendation    Incomplete orders Urine sample. Attempted twice and dropped twice.   Additional Comments: non   If any further questions, please call Sending RN at [phone]      Electronically signed by: Electronically signed by Ana Castro RN on 5/6/2024 at 10:02 PM       Ana Castro RN  05/06/24 8256

## 2024-05-07 NOTE — DIALYSIS
Treatment time: 3 hours  Net UF: 1900 ml     Pre weight: 72.1 kg  Post weight:69.7 kg    Crit Line Used: Yes Ending Profile: A  Refill Present: Unable to perform    Access used: R TDC    Access function: Well with  ml/min     Medications or blood products given: n/a     Regular outpatient schedule: MWF [facility]     Summary of response to treatment: Patient tolerated treatment well with one episode of extreme cramping to abdomen and right leg. Saline flush, then rinse back given after cramping did not subside. Patient terminated treatment 27 minutes early after cramping resolved. AMA signed and placed in chart.  Patient remained stable throughout entire treatment and upon the exiting the dialysis suite via staff transport.     Report given to Katiuska Hernandez RN and copy of dialysis treatment record placed in chart, to be scanned into EMR.

## 2024-05-07 NOTE — CARE COORDINATION
Case Management Assessment  Initial Evaluation    Date/Time of Evaluation: 5/7/2024 4:08 PM  Assessment Completed by: Jamar Blue RN    If patient is discharged prior to next notation, then this note serves as note for discharge by case management.    Patient Name: Valery Ramirez                   YOB: 1950  Diagnosis: GI bleed [K92.2]                   Date / Time: 5/6/2024  4:41 PM    Patient Admission Status: Inpatient   Readmission Risk (Low < 19, Mod (19-27), High > 27): Readmission Risk Score: 22.2    Current PCP: Tori Mora MD (Inactive)  PCP verified by CM? Yes    Chart Reviewed: Yes      History Provided by: Patient  Patient Orientation: Alert and Oriented    Patient Cognition: Alert    Hospitalization in the last 30 days (Readmission):  No    If yes, Readmission Assessment in CM Navigator will be completed and shown below.          Advance Directives:      Code Status: Full Code   Patient's Primary Decision Maker is: Legal Next of Kin      Discharge Planning:    Patient lives with: Alone Type of Home: Apartment  Primary Care Giver: Self  Patient Support Systems include: Family Members, Friends/Neighbors   Current Financial resources: Medicare  Current community resources:    Current services prior to admission: Other (Comment), Home Care ([facility] [facility])            Current DME:              Type of Home Care services:  Housekeeping    ADLS  Prior functional level: Independent in ADLs/IADLs  Current functional level: Independent in ADLs/IADLs    PT AM-PAC:   /24  OT AM-PAC:   /24    Family can provide assistance at DC: No  Would you like Case Management to discuss the discharge plan with any other family members/significant others, and if so, who? No  Plans to Return to Present Housing: Yes  Other Identified Issues/Barriers to RETURNING to current housing: medical complications  Potential Assistance needed at discharge: Home Care            Potential DME:    Patient expects to  discharge to: Apartment  Plan for transportation at discharge: Other (see comment)    Financial    Payor: MEDICARE / Plan: MEDICARE PART A AND B / Product Type: *No Product type* /     Does insurance require precert for SNF: No    Potential assistance Purchasing Medications: No  Meds-to-Beds request:        [id number] - [place], [place] - [address] -  [phone] - F [phone]  [address] [address]  Phone: [phone] Fax: [phone]      Notes:    Factors facilitating achievement of predicted outcomes: Caregiver support and Good insight into deficits    Barriers to discharge: Medical complications    Additional Case Management Notes: patient is from home, IPTA. Has COA services. Patient admitted for GI bleed [K92.2]  Plan for scope per GI,. Monitoring H&H. Plan for HD today  CM following for dc planning and support.     The Plan for Transition of Care is related to the following treatment goals of GI bleed [K92.2]    IF APPLICABLE: The Patient and/or patient representative Valery and her family were provided with a choice of provider and agrees with the discharge plan. Freedom of choice list with basic dialogue that supports the patient's individualized plan of care/goals and shares the quality data associated with the providers was provided to: Patient   Patient Representative Name:       The Patient and/or Patient Representative Agree with the Discharge Plan? Yes    Jamar Blue RN  Case Management Department  Ph: [phone] Fax: [phone]

## 2024-05-07 NOTE — CONSULTS
50MG Oral 1 daily ) Active -Hx Entry Oral daily for 0  Patient not taking: Reported on 5/6/2024  Antonion Read MD   albuterol sulfate HFA (PROVENTIL;VENTOLIN;PROAIR) 108 (90 Base) MCG/ACT inhaler albuterol sulfate HFA 90 mcg/actuation aerosol inhaler   Inhale 2 puffs every 4-6 hours by inhalation route as needed.  Patient not taking: Reported on 5/6/2024  Antonino Read MD   BUPivacaine (MARCAINE) 0.5 % injection 0.5 mLs  Patient not taking: Reported on 5/6/2024  Antonino Read MD   calcium acetate (PHOSLO) 667 MG CAPS capsule calcium acetate(phosphate binders) 667 mg capsule   TAKE 1 CAPSULE BY MOUTH THREE TIMES DAILY WITH MEALS  Patient not taking: Reported on 5/6/2024  Antonino Read MD   celecoxib (CELEBREX) 100 MG capsule Celebrex 100 mg capsule   TAKE 1 CAPSULE BY MOUTH 2 TO 3 TIMES A WEEK AS NEEDED  Patient not taking: Reported on 5/6/2024  Antonino Read MD   clopidogrel (PLAVIX) 75 MG tablet clopidogrel 75 mg tablet   1 daily  Antonino Read MD   colchicine (COLCRYS) 0.6 MG tablet colchicine 0.6 mg tablet   1 daily prn  Patient not taking: Reported on 5/6/2024  Antonino Read MD   ferric citrate (AURYXIA) 1  MG(Fe) TABS tablet Auryxia 210 mg iron tablet   TAKE 3 TABLETS BY MOUTH THREE TIMES A DAY WITH MEALS.   SWALLOW WHOLE, DO NOT CHEW OR CRUSH MEDICATION  Patient not taking: Reported on 5/6/2024  Antonino Read MD   gabapentin (NEURONTIN) 100 MG capsule gabapentin 100 mg capsule   1 3 x aday  Antonino Read MD   glipiZIDE (GLUCOTROL XL) 2.5 MG extended release tablet glipizide ER 2.5 mg tablet, extended release 24 hr   1 daily  Antonino Read MD   HYDROcodone-acetaminophen (NORCO)  MG per tablet hydrocodone 10 mg-acetaminophen 325 mg tablet   TAKE 1 TABLET BY MOUTH 2x A DAY AS NEEDED FOR PAIN  Patient not taking: Reported on 5/6/2024  Antonino Read MD   indomethacin (INDOCIN) 50 MG suppository Indocin 50 mg rectal  Daily  atorvastatin (LIPITOR) tablet 80 mg, 80 mg, Oral, Daily  hydrALAZINE (APRESOLINE) injection 5 mg, 5 mg, IntraVENous, Q6H PRN  insulin lispro (HUMALOG) injection vial 0-4 Units, 0-4 Units, SubCUTAneous, TID WC  insulin lispro (HUMALOG) injection vial 0-4 Units, 0-4 Units, SubCUTAneous, Nightly  glucose chewable tablet 16 g, 4 tablet, Oral, PRN  dextrose bolus 10% 125 mL, 125 mL, IntraVENous, PRN **OR** dextrose bolus 10% 250 mL, 250 mL, IntraVENous, PRN  glucagon injection 1 mg, 1 mg, SubCUTAneous, PRN  dextrose 10 % infusion, , IntraVENous, Continuous PRN  0.9 % sodium chloride infusion, , IntraVENous, PRN  acetaminophen (TYLENOL) tablet 650 mg, 650 mg, Oral, Q6H PRN **OR** acetaminophen (TYLENOL) suppository 650 mg, 650 mg, Rectal, Q6H PRN  pantoprazole (PROTONIX) 40 mg in sodium chloride (PF) 0.9 % 10 mL injection, 40 mg, IntraVENous, Q12H     Social History:   Social History       Tobacco History       Smoking Status  Every Day Smoking Tobacco Type  Cigarettes      Smokeless Tobacco Use  Never              Alcohol History       Alcohol Use Status  No              Drug Use       Drug Use Status  No              Sexual Activity       Sexually Active  Not Asked                     Family History:  No family history on file.     Allergies:  Allergies   Allergen Reactions    Penicillins     Codeine Nausea And Vomiting    Lidocaine Other (See Comments) and Rash     Patient states medication makes her hot    Methoxy Polyethylene Glycol-Epoetin Beta      Other reaction(s): Flushing (Red Skin)        ROS:   General: No fever or weight change  Hematologic: No unexpected submucosal bleeding or bruising  HEENT: No sore throat or facial pain  Respiratory: No cough or dyspnea  Cardiovascular: No angina or dependent edema  Gastrointestinal: See HPI  Musculoskeletal: No usual joint pain or stiffness  Skin: No skin eruptions or changing lesions  Neurologic: No focal weakness or numbness  Psychiatric: No anxiety or sleep

## 2024-05-07 NOTE — PLAN OF CARE
Problem: Discharge Planning  Goal: Discharge to home or other facility with appropriate resources  5/7/2024 0320 by Adelso Henson RN  Outcome: Progressing     Problem: ABCDS Injury Assessment  Goal: Absence of physical injury  5/7/2024 0320 by Adelso Henson RN  Outcome: Progressing     Problem: Safety - Adult  Goal: Free from fall injury  5/7/2024 0320 by Adelso Henson RN  Outcome: Progressing     Problem: Pain  Goal: Verbalizes/displays adequate comfort level or baseline comfort level  Outcome: Progressing

## 2024-05-07 NOTE — PLAN OF CARE
Problem: Discharge Planning  Goal: Discharge to home or other facility with appropriate resources  5/7/2024 1037 by Katiuska Hernandez, RN  Outcome: Progressing  Flowsheets (Taken 5/7/2024 1037)  Discharge to home or other facility with appropriate resources:   Identify barriers to discharge with patient and caregiver   Identify discharge learning needs (meds, wound care, etc)   Refer to discharge planning if patient needs post-hospital services based on physician order or complex needs related to functional status, cognitive ability or social support system   Arrange for needed discharge resources and transportation as appropriate     Problem: ABCDS Injury Assessment  Goal: Absence of physical injury  5/7/2024 1037 by Katiuska Hernandez RN  Outcome: Progressing  Flowsheets (Taken 5/7/2024 1037)  Absence of Physical Injury: Implement safety measures based on patient assessment     Problem: Safety - Adult  Goal: Free from fall injury  5/7/2024 1037 by Katiuska Hernandez RN  Outcome: Progressing  Flowsheets (Taken 5/7/2024 1037)  Free From Fall Injury: Instruct family/caregiver on patient safety     Problem: Pain  Goal: Verbalizes/displays adequate comfort level or baseline comfort level  5/7/2024 1037 by Katiuska Hernandez, RN  Flowsheets (Taken 5/7/2024 1037)  Verbalizes/displays adequate comfort level or baseline comfort level:   Encourage patient to monitor pain and request assistance   Administer analgesics based on type and severity of pain and evaluate response   Assess pain using appropriate pain scale   Implement non-pharmacological measures as appropriate and evaluate response   Notify Licensed Independent Practitioner if interventions unsuccessful or patient reports new pain

## 2024-05-07 NOTE — PROGRESS NOTES
When obtaining vitals to start blood. Patient's right breast and arm are noticeably more swollen than they were this morning. Notified on call via perfect serve. Awaiting response.

## 2024-05-07 NOTE — PROGRESS NOTES
Patient returned from dialysis. Vitals and assessment are as noted. Was seen at bedside by Dr. Jimenez and residents. Plan for PUSH Endoscopy on tomorrow

## 2024-05-07 NOTE — PROGRESS NOTES
B/P  is elevated. Notified dialysis nurse. Medications not given as patient will have dialysis soon.

## 2024-05-07 NOTE — CONSULTS
Nephrology Consult Note                                                                                                                                                                                                                                                                                                                                                               Office : [phone]     Fax :[phone]    Patient's Name: Valery Ramirez  8:36 AM  5/7/2024    Reason for Consult:  ESRD on HD  Requesting Physician:  Tori Mora MD (Inactive)  Chief Complaint:    Chief Complaint   Patient presents with    Abdominal Pain     Patient reports black stool and pain since Thursday, she is on blood thinners, dialysis patient MWF but missed Friday, she reports being short of breath today.        Assessment/Plan     # ESRD on HD   - Dialyzes on a MWF outpatient schedule via [facility]  - Missed HD on Monday. Plan for iHD today  - Renally dose meds for ESRD  - Labs in AM    # HTN  - BP's elevated suspect sec to pain. Continue CCB  - UF today with HD  - Monitor     # Acute on chronic anemia   - In setting of GIB  - Transfuse per primary  - UMBERTO with HD     # Acid- base/ Electrolyte imbalance   - Mild acidosis will correct with HD  - Monitor lytes     # CHF  - Pleural effusions seen on imaging   - UF with HD today     # DM2  - Management per primary     # GI bleed  - GI consulted  - Plan for scope today       History of Present Ilness:    Valery Ramirez is a 73 y.o. female with a PMH of ESRD, CHF, DM2, HTN, COPD, and Afib on AC, who presents with abdominal pain and black tarry stools/diarrhea. Lab work-up in the ER revealed a Hgb of 5.5. She was given pRBC's and GI was consulted for further eval/management of GIB. We are consulted for ESRD on HD needing dialysis.     Interval hx:    Resting in bed  Currently on 2L supplemental O2  Discussed plan for HD today      Past Medical History:   Diagnosis Date    CHF  CONTRAST   Final Result   1. Moderate right and small left pleural effusion. Adjacent dependent   consolidation, likely atelectasis.   2. Mild bilateral interlobular septal thickening, possible reflecting   interstitial pulmonary edema.   3. Aortic and coronary atherosclerosis.      CTA ABDOMEN PELVIS W CONTRAST   Final Result      1.  No CT evidence of active GI bleed.   2.  Moderate right and small left pleural effusions.   3.  Suspected hepatic cirrhosis.   4.  Incidental left adrenal myelolipoma.   5.  Severe right and moderate left renal atrophy.   6.  Colonic diverticulosis without evidence of diverticulitis.   7.  Severe lumbar spondylosis.   8.  Severe atherosclerosis.         XR CHEST (2 VW)   Final Result      Cardiomegaly and pulmonary vascular congestion.   No focal consolidation.               Medical Decision Making:  The following items were considered in medical decision making:  Discussion of patient care with other providers  Reviewed clinical lab tests  Reviewed radiology tests  Reviewed other diagnostic tests/interventions    Will be discussed w/  Primary team     Thank you for allowing us to participate in care of Valery Ramirez   Feel free to contact me,     Vanna Munguia PA-C   [facility]  Office : [phone] or through [employer]  Fax :[phone]    I have seen the patient independently from the PA/NP .I discussed the care with the PA/NP . I personally reviewed the HPI, PH, FH, SH, ROS and medications. I repeated pertinent portions of the examination and reviewed the relevant imaging and laboratory data. I agree with the findings, assessment and plan as documented, with the following addendum: pt seen on HD everette well         Aquiles Beltran MD

## 2024-05-07 NOTE — PROGRESS NOTES
V2.0    [facility] Progress Note      Name:  Valery Ramirez /Age/Sex: 1950  (73 y.o. female)   MRN & CSN:  [id number] & [id number] Encounter Date/Time: 2024 9:51 AM EDT   Location:  4317/4317-01 PCP: Tori Mora MD (Inactive)     Attending:Sharonda Hernandez MD       Hospital Day: 2    Assessment and Recommendations   Valery Ramirez is a 73 y.o. female with pmh of coronary artery disease (previously complicated by stent restenosis and pseudoaneurysm), end-stage renal disease on hemodialysis, COPD, hypertension, hyperlipidemia, type 2 diabetes who presented to the emergency room with complaints of abdominal pain and multiple episodes of black tarry stools.  Patient reports having greater than 10 loose bowel movements a day.    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    In the emergency room patient's workup was significant for hypertension, black tarry stools.  She was started on IV Protonix, Kcentra was given to reverse Xarelto and GI was consulted.      Plan:   GI bleed-blood transfusion ordered.  GI consulted.  End-stage renal disease on hemodialysis-nephrology consulted.  Patient has 2 missed dialysis sessions.  Pleural effusions-moderate right and small left pleural effusions on CTA.  Most likely due to missed dialysis  Diabetes-sliding scale insulin  Increase troponin-decrease on serial test  COPD-without exacerbation  Proximal A-fib-patient and normal sinus rhythm.  Hold Xarelto secondary to GI bleed.    Patient will receive blood transfusion in dialysis today. Plan for EGD.    Diet Diet NPO Exceptions are: Ice Chips   DVT Prophylaxis [] Lovenox, []  Heparin, [] SCDs, [] Ambulation,  [] Eliquis, [] Xarelto  [] Coumadin   Code Status Full Code   Disposition From: Home  Expected Disposition: Home  Estimated Date of Discharge: 2 to 3 days  Patient requires continued admission due to GI bleed   Surrogate Decision Maker/ POA  Per EMR  atherosclerosis.     XR CHEST (2 VW)    Result Date: 5/6/2024  EXAM: Chest PA and Lateral views INDICATION: dyspnea COMPARISON: 5/6/2023 FINDINGS: Right IJ approach dialysis catheter terminates at the cavoatrial junction. Left arm stent is redemonstrated. There is stable cardiomegaly and pulmonary vascular congestion. There is no focal consolidation, pleural effusion, or pneumothorax. The visible bony thorax is intact.     Cardiomegaly and pulmonary vascular congestion. No focal consolidation.       CBC:   Recent Labs     05/06/24 1818 05/07/24 0029 05/07/24 0453   WBC 3.1*  --  2.9*   HGB 5.5* 6.1* 6.2*     --  124*     BMP:    Recent Labs     05/06/24 1818 05/07/24 0453    140   K 4.3 4.1    109   CO2 17* 18*   BUN 92* 88*   CREATININE 5.8* 5.6*   GLUCOSE 76 93     Hepatic:   Recent Labs     05/06/24 1818 05/07/24 0453   AST 10* 11*   ALT 6* 6*   BILITOT <0.2 0.3   ALKPHOS 106 102     Lipids: No results found for: \"CHOL\", \"HDL\", \"TRIG\"  Hemoglobin A1C: No results found for: \"LABA1C\"  TSH:   Lab Results   Component Value Date/Time    TSH 0.58 05/06/2023 04:42 AM     Troponin: No results found for: \"TROPONINT\"  Lactic Acid: No results for input(s): \"LACTA\" in the last 72 hours.  BNP:   Recent Labs     05/06/24 1818   PROBNP 10,278*     UA:  Lab Results   Component Value Date/Time    NITRU Negative 05/07/2024 03:45 AM    COLORU Yellow 05/07/2024 03:45 AM    PHUR 6.0 05/07/2024 03:45 AM    WBCUA 0-2 05/07/2024 03:45 AM    RBCUA 0-2 05/07/2024 03:45 AM    BACTERIA Rare 05/07/2024 03:45 AM    CLARITYU Clear 05/07/2024 03:45 AM    LEUKOCYTESUR SMALL 05/07/2024 03:45 AM    UROBILINOGEN 0.2 05/07/2024 03:45 AM    BILIRUBINUR Negative 05/07/2024 03:45 AM    BLOODU Negative 05/07/2024 03:45 AM    GLUCOSEU Negative 05/07/2024 03:45 AM    KETUA Negative 05/07/2024 03:45 AM     Urine Cultures: No results found for: \"LABURIN\"  Blood Cultures: No results found for: \"BC\"  No results found for:

## 2024-05-07 NOTE — CONSENT
Informed Consent for Blood Component Transfusion Note    I have discussed with the patient the rationale for blood component transfusion; its benefits in treating or preventing fatigue, organ damage, or death; and its risk which includes mild transfusion reactions, rare risk of blood borne infection, or more serious but rare reactions. I have discussed the alternatives to transfusion, including the risk and consequences of not receiving transfusion. The patient had an opportunity to ask questions and had agreed to proceed with transfusion of blood components.    Electronically signed by Reymundo Farooq MD on 5/7/24 at 7:38 AM EDT

## 2024-05-08 ENCOUNTER — ANESTHESIA EVENT (OUTPATIENT)
Dept: ENDOSCOPY | Age: 74
End: 2024-05-08
Payer: MEDICARE

## 2024-05-08 ENCOUNTER — ANESTHESIA (OUTPATIENT)
Dept: ENDOSCOPY | Age: 74
End: 2024-05-08
Payer: MEDICARE

## 2024-05-08 LAB
ALBUMIN SERPL-MCNC: 3.5 G/DL (ref 3.4–5)
ALBUMIN/GLOB SERPL: 1.6 {RATIO} (ref 1.1–2.2)
ALP SERPL-CCNC: 107 U/L (ref 40–129)
ALT SERPL-CCNC: 6 U/L (ref 10–40)
ANION GAP SERPL CALCULATED.3IONS-SCNC: 10 MMOL/L (ref 3–16)
AST SERPL-CCNC: 13 U/L (ref 15–37)
BASOPHILS # BLD: 0 K/UL (ref 0–0.2)
BASOPHILS NFR BLD: 0.9 %
BILIRUB SERPL-MCNC: 0.5 MG/DL (ref 0–1)
BLOOD BANK DISPENSE STATUS: NORMAL
BLOOD BANK PRODUCT CODE: NORMAL
BPU ID: NORMAL
BUN SERPL-MCNC: 36 MG/DL (ref 7–20)
CALCIUM SERPL-MCNC: 7.9 MG/DL (ref 8.3–10.6)
CHLORIDE SERPL-SCNC: 106 MMOL/L (ref 99–110)
CO2 SERPL-SCNC: 24 MMOL/L (ref 21–32)
CREAT SERPL-MCNC: 3.3 MG/DL (ref 0.6–1.2)
DEPRECATED RDW RBC AUTO: 21 % (ref 12.4–15.4)
DESCRIPTION BLOOD BANK: NORMAL
EKG ATRIAL RATE: 60 BPM
EKG DIAGNOSIS: NORMAL
EKG P AXIS: 82 DEGREES
EKG P-R INTERVAL: 216 MS
EKG Q-T INTERVAL: 472 MS
EKG QRS DURATION: 112 MS
EKG QTC CALCULATION (BAZETT): 472 MS
EKG R AXIS: 2 DEGREES
EKG T AXIS: 120 DEGREES
EKG VENTRICULAR RATE: 60 BPM
EOSINOPHIL # BLD: 0.1 K/UL (ref 0–0.6)
EOSINOPHIL NFR BLD: 1.9 %
GFR SERPLBLD CREATININE-BSD FMLA CKD-EPI: 14 ML/MIN/{1.73_M2}
GLUCOSE BLD-MCNC: 118 MG/DL (ref 70–99)
GLUCOSE BLD-MCNC: 118 MG/DL (ref 70–99)
GLUCOSE BLD-MCNC: 129 MG/DL (ref 70–99)
GLUCOSE BLD-MCNC: 141 MG/DL (ref 70–99)
GLUCOSE SERPL-MCNC: 107 MG/DL (ref 70–99)
HBV SURFACE AB SERPL IA-ACNC: <3.5 MIU/ML
HBV SURFACE AG SERPL QL IA: NORMAL
HCT VFR BLD AUTO: 21.1 % (ref 36–48)
HCT VFR BLD AUTO: 21.5 % (ref 36–48)
HCT VFR BLD AUTO: 22.2 % (ref 36–48)
HCT VFR BLD AUTO: 22.7 % (ref 36–48)
HCT VFR BLD AUTO: 23.3 % (ref 36–48)
HCT VFR BLD AUTO: 23.7 % (ref 36–48)
HGB BLD-MCNC: 6.9 G/DL (ref 12–16)
HGB BLD-MCNC: 7 G/DL (ref 12–16)
HGB BLD-MCNC: 7.1 G/DL (ref 12–16)
HGB BLD-MCNC: 7.4 G/DL (ref 12–16)
HGB BLD-MCNC: 7.7 G/DL (ref 12–16)
HGB BLD-MCNC: 7.7 G/DL (ref 12–16)
LYMPHOCYTES # BLD: 0.2 K/UL (ref 1–5.1)
LYMPHOCYTES NFR BLD: 8.3 %
MCH RBC QN AUTO: 30.1 PG (ref 26–34)
MCHC RBC AUTO-ENTMCNC: 32.9 G/DL (ref 31–36)
MCV RBC AUTO: 91.4 FL (ref 80–100)
MONOCYTES # BLD: 0.3 K/UL (ref 0–1.3)
MONOCYTES NFR BLD: 10.6 %
NEUTROPHILS # BLD: 2.1 K/UL (ref 1.7–7.7)
NEUTROPHILS NFR BLD: 78.3 %
PERFORMED ON: ABNORMAL
PHOSPHATE SERPL-MCNC: 4.7 MG/DL (ref 2.5–4.9)
PLATELET # BLD AUTO: 119 K/UL (ref 135–450)
PMV BLD AUTO: 9.3 FL (ref 5–10.5)
POTASSIUM SERPL-SCNC: 4 MMOL/L (ref 3.5–5.1)
PROT SERPL-MCNC: 5.7 G/DL (ref 6.4–8.2)
RBC # BLD AUTO: 2.55 M/UL (ref 4–5.2)
SODIUM SERPL-SCNC: 140 MMOL/L (ref 136–145)
WBC # BLD AUTO: 2.7 K/UL (ref 4–11)

## 2024-05-08 PROCEDURE — 7100000010 HC PHASE II RECOVERY - FIRST 15 MIN: Performed by: INTERNAL MEDICINE

## 2024-05-08 PROCEDURE — 99233 SBSQ HOSP IP/OBS HIGH 50: CPT | Performed by: INTERNAL MEDICINE

## 2024-05-08 PROCEDURE — 6360000002 HC RX W HCPCS: Performed by: FAMILY MEDICINE

## 2024-05-08 PROCEDURE — 2580000003 HC RX 258: Performed by: NURSE ANESTHETIST, CERTIFIED REGISTERED

## 2024-05-08 PROCEDURE — 0W3P8ZZ CONTROL BLEEDING IN GASTROINTESTINAL TRACT, VIA NATURAL OR ARTIFICIAL OPENING ENDOSCOPIC: ICD-10-PCS | Performed by: INTERNAL MEDICINE

## 2024-05-08 PROCEDURE — 2580000003 HC RX 258: Performed by: FAMILY MEDICINE

## 2024-05-08 PROCEDURE — 84100 ASSAY OF PHOSPHORUS: CPT

## 2024-05-08 PROCEDURE — 85014 HEMATOCRIT: CPT

## 2024-05-08 PROCEDURE — 6360000002 HC RX W HCPCS: Performed by: NURSE ANESTHETIST, CERTIFIED REGISTERED

## 2024-05-08 PROCEDURE — 80053 COMPREHEN METABOLIC PANEL: CPT

## 2024-05-08 PROCEDURE — A4216 STERILE WATER/SALINE, 10 ML: HCPCS | Performed by: FAMILY MEDICINE

## 2024-05-08 PROCEDURE — 6370000000 HC RX 637 (ALT 250 FOR IP): Performed by: FAMILY MEDICINE

## 2024-05-08 PROCEDURE — 3700000000 HC ANESTHESIA ATTENDED CARE: Performed by: INTERNAL MEDICINE

## 2024-05-08 PROCEDURE — 3609014100 HC ENTEROSCOPY > 2ND PRTN W/CONTROL BLEEDING: Performed by: INTERNAL MEDICINE

## 2024-05-08 PROCEDURE — 36430 TRANSFUSION BLD/BLD COMPNT: CPT

## 2024-05-08 PROCEDURE — 85018 HEMOGLOBIN: CPT

## 2024-05-08 PROCEDURE — 36415 COLL VENOUS BLD VENIPUNCTURE: CPT

## 2024-05-08 PROCEDURE — 7100000011 HC PHASE II RECOVERY - ADDTL 15 MIN: Performed by: INTERNAL MEDICINE

## 2024-05-08 PROCEDURE — 85025 COMPLETE CBC W/AUTO DIFF WBC: CPT

## 2024-05-08 PROCEDURE — C9113 INJ PANTOPRAZOLE SODIUM, VIA: HCPCS | Performed by: FAMILY MEDICINE

## 2024-05-08 PROCEDURE — 2709999900 HC NON-CHARGEABLE SUPPLY: Performed by: INTERNAL MEDICINE

## 2024-05-08 PROCEDURE — 3700000001 HC ADD 15 MINUTES (ANESTHESIA): Performed by: INTERNAL MEDICINE

## 2024-05-08 PROCEDURE — 2720000010 HC SURG SUPPLY STERILE: Performed by: INTERNAL MEDICINE

## 2024-05-08 PROCEDURE — 1200000000 HC SEMI PRIVATE

## 2024-05-08 PROCEDURE — 6370000000 HC RX 637 (ALT 250 FOR IP): Performed by: HOSPITALIST

## 2024-05-08 PROCEDURE — 93010 ELECTROCARDIOGRAM REPORT: CPT | Performed by: INTERNAL MEDICINE

## 2024-05-08 RX ORDER — ROPINIROLE 1 MG/1
1 TABLET, FILM COATED ORAL NIGHTLY
Status: DISCONTINUED | OUTPATIENT
Start: 2024-05-08 | End: 2024-05-10 | Stop reason: HOSPADM

## 2024-05-08 RX ORDER — CILOSTAZOL 50 MG/1
100 TABLET ORAL 2 TIMES DAILY
Status: DISCONTINUED | OUTPATIENT
Start: 2024-05-08 | End: 2024-05-10 | Stop reason: HOSPADM

## 2024-05-08 RX ORDER — SPIRONOLACTONE 50 MG/1
50 TABLET, FILM COATED ORAL DAILY
Status: DISCONTINUED | OUTPATIENT
Start: 2024-05-08 | End: 2024-05-10 | Stop reason: HOSPADM

## 2024-05-08 RX ORDER — SODIUM CHLORIDE 9 MG/ML
INJECTION, SOLUTION INTRAVENOUS PRN
Status: DISCONTINUED | OUTPATIENT
Start: 2024-05-08 | End: 2024-05-10 | Stop reason: HOSPADM

## 2024-05-08 RX ORDER — FLUOXETINE HYDROCHLORIDE 20 MG/1
40 CAPSULE ORAL DAILY
Status: DISCONTINUED | OUTPATIENT
Start: 2024-05-08 | End: 2024-05-10 | Stop reason: HOSPADM

## 2024-05-08 RX ORDER — TRAZODONE HYDROCHLORIDE 50 MG/1
50 TABLET ORAL NIGHTLY
Status: DISCONTINUED | OUTPATIENT
Start: 2024-05-08 | End: 2024-05-10 | Stop reason: HOSPADM

## 2024-05-08 RX ORDER — PROPOFOL 10 MG/ML
INJECTION, EMULSION INTRAVENOUS PRN
Status: DISCONTINUED | OUTPATIENT
Start: 2024-05-08 | End: 2024-05-08 | Stop reason: SDUPTHER

## 2024-05-08 RX ORDER — ISOSORBIDE MONONITRATE 60 MG/1
60 TABLET, EXTENDED RELEASE ORAL DAILY
Status: DISCONTINUED | OUTPATIENT
Start: 2024-05-08 | End: 2024-05-10 | Stop reason: HOSPADM

## 2024-05-08 RX ORDER — ISOSORBIDE MONONITRATE 30 MG/1
30 TABLET, EXTENDED RELEASE ORAL NIGHTLY
Status: DISCONTINUED | OUTPATIENT
Start: 2024-05-08 | End: 2024-05-10 | Stop reason: HOSPADM

## 2024-05-08 RX ORDER — METOPROLOL SUCCINATE 50 MG/1
50 TABLET, EXTENDED RELEASE ORAL 2 TIMES DAILY
Status: DISCONTINUED | OUTPATIENT
Start: 2024-05-08 | End: 2024-05-10 | Stop reason: HOSPADM

## 2024-05-08 RX ORDER — SODIUM CHLORIDE, SODIUM LACTATE, POTASSIUM CHLORIDE, CALCIUM CHLORIDE 600; 310; 30; 20 MG/100ML; MG/100ML; MG/100ML; MG/100ML
INJECTION, SOLUTION INTRAVENOUS CONTINUOUS PRN
Status: DISCONTINUED | OUTPATIENT
Start: 2024-05-08 | End: 2024-05-08 | Stop reason: SDUPTHER

## 2024-05-08 RX ADMIN — METOPROLOL SUCCINATE 50 MG: 50 TABLET, EXTENDED RELEASE ORAL at 20:09

## 2024-05-08 RX ADMIN — CILOSTAZOL 100 MG: 50 TABLET ORAL at 20:09

## 2024-05-08 RX ADMIN — TRAZODONE HYDROCHLORIDE 50 MG: 50 TABLET ORAL at 20:09

## 2024-05-08 RX ADMIN — CILOSTAZOL 100 MG: 50 TABLET ORAL at 08:29

## 2024-05-08 RX ADMIN — GLUCAGON 1 MG: 1 INJECTION, POWDER, LYOPHILIZED, FOR SOLUTION INTRAMUSCULAR; INTRAVENOUS at 14:13

## 2024-05-08 RX ADMIN — SODIUM CHLORIDE, PRESERVATIVE FREE 40 MG: 5 INJECTION INTRAVENOUS at 20:11

## 2024-05-08 RX ADMIN — PROPOFOL 30 MG: 10 INJECTION, EMULSION INTRAVENOUS at 14:14

## 2024-05-08 RX ADMIN — SODIUM CHLORIDE, SODIUM LACTATE, POTASSIUM CHLORIDE, AND CALCIUM CHLORIDE: .6; .31; .03; .02 INJECTION, SOLUTION INTRAVENOUS at 13:50

## 2024-05-08 RX ADMIN — PROPOFOL 40 MG: 10 INJECTION, EMULSION INTRAVENOUS at 14:10

## 2024-05-08 RX ADMIN — PROPOFOL 40 MG: 10 INJECTION, EMULSION INTRAVENOUS at 14:02

## 2024-05-08 RX ADMIN — ISOSORBIDE MONONITRATE 60 MG: 60 TABLET, EXTENDED RELEASE ORAL at 08:34

## 2024-05-08 RX ADMIN — HYDRALAZINE HYDROCHLORIDE 5 MG: 20 INJECTION INTRAMUSCULAR; INTRAVENOUS at 04:58

## 2024-05-08 RX ADMIN — SODIUM CHLORIDE, PRESERVATIVE FREE 40 MG: 5 INJECTION INTRAVENOUS at 08:29

## 2024-05-08 RX ADMIN — ROPINIROLE HYDROCHLORIDE 1 MG: 1 TABLET, FILM COATED ORAL at 20:09

## 2024-05-08 RX ADMIN — PROPOFOL 20 MG: 10 INJECTION, EMULSION INTRAVENOUS at 14:03

## 2024-05-08 RX ADMIN — FLUOXETINE HYDROCHLORIDE 40 MG: 20 CAPSULE ORAL at 08:34

## 2024-05-08 RX ADMIN — METOPROLOL SUCCINATE 50 MG: 50 TABLET, EXTENDED RELEASE ORAL at 08:28

## 2024-05-08 RX ADMIN — AMLODIPINE BESYLATE 5 MG: 5 TABLET ORAL at 08:29

## 2024-05-08 RX ADMIN — PROPOFOL 30 MG: 10 INJECTION, EMULSION INTRAVENOUS at 14:07

## 2024-05-08 RX ADMIN — SPIRONOLACTONE 50 MG: 50 TABLET ORAL at 08:34

## 2024-05-08 RX ADMIN — ISOSORBIDE MONONITRATE 30 MG: 30 TABLET, EXTENDED RELEASE ORAL at 20:09

## 2024-05-08 ASSESSMENT — PAIN - FUNCTIONAL ASSESSMENT
PAIN_FUNCTIONAL_ASSESSMENT: NONE - DENIES PAIN

## 2024-05-08 NOTE — OP NOTE
Procedure  Push Enteroscopy with APC  Indications  History of small bowel AVMs, anemia with GI bleed now  Consent  The anesthesia and procedure along with the risks, benefits and alternatives of each were explained by the physician  and nurse to the patient to their satisfaction and ample opportunities to ask questions was provided. We discussed  the risks of bleeding, perforation, anesthesia reaction as well as the alternatives, and missed lesions. Verbal and  written consent were obtained with the nurses present.  Monitoring  The patient was monitored with continuous pulse oximetry, heart rate monitoring and intermittent blood pressure  monitoring throughout the procedure.  Procedure Medications  MAC  Details of Procedure  The Olympus pediatric colonoscope was inserted atraumatically into the esophagus and advanced under direct vision to the proximal/mid jejunum using the described technique. The scope was slowly withdrawn carefully inspecting the jejunum, duodenum, entire stomach in both forward and retroflexed views and esophagus.    Multiple AVMs noted in the jejunum, treated with APC successfully    EBL: None    Post Op Diagnosis  -Jejunal AVMs, treated with APC.    Recommendations  Resume diet  Watch hemoglobin and hematocrit    Thank you and please let me know if there are any additional questions or concerns.     Tony Maria

## 2024-05-08 NOTE — PLAN OF CARE
Problem: Discharge Planning  Goal: Discharge to home or other facility with appropriate resources  5/8/2024 1006 by Cate Mazariegos RN  Outcome: Progressing    Problem: ABCDS Injury Assessment  Goal: Absence of physical injury  5/8/2024 1006 by Cate Mazariegos RN  Outcome: Progressing     Problem: Safety - Adult  Goal: Free from fall injury  5/8/2024 1006 by Cate Mazariegos RN  Outcome: Progressing     Problem: Pain  Goal: Verbalizes/displays adequate comfort level or baseline comfort level  5/8/2024 1006 by Cate Mazariegos RN  Outcome: Progressing     Problem: Nutrition Deficit:  Goal: Optimize nutritional status  Outcome: Progressing     Problem: Chronic Conditions and Co-morbidities  Goal: Patient's chronic conditions and co-morbidity symptoms are monitored and maintained or improved  Outcome: Progressing

## 2024-05-08 NOTE — ANESTHESIA POSTPROCEDURE EVALUATION
Department of Anesthesiology  Postprocedure Note    Patient: Valery Ramirez  MRN: [id number]  YOB: 1950  Date of evaluation: 5/8/2024    Procedure Summary       Date: 05/08/24 Room / Location: [place] [address] / [facility]    Anesthesia Start: 1350 Anesthesia Stop: 1426    Procedure: ENTEROSCOPY PUSH CONTROL HEMORRHAGE Diagnosis:       Gastrointestinal hemorrhage, unspecified gastrointestinal hemorrhage type      (Gastrointestinal hemorrhage, unspecified gastrointestinal hemorrhage type [K92.2])    Surgeons: Tony Maria MD Responsible Provider: Kenny Horne MD    Anesthesia Type: MAC ASA Status: 4            Anesthesia Type: No value filed.    Marquita Phase I: Marquita Score: 10    Marquita Phase II:      Anesthesia Post Evaluation    Patient location during evaluation: PACU  Patient participation: complete - patient participated  Level of consciousness: awake and alert  Pain score: 0  Airway patency: patent  Nausea & Vomiting: no nausea and no vomiting  Cardiovascular status: hemodynamically stable  Respiratory status: acceptable  Hydration status: stable  Pain management: adequate and satisfactory to patient    No notable events documented.

## 2024-05-08 NOTE — FLOWSHEET NOTE
05/08/24 0815   Vital Signs   Temp 98.7 °F (37.1 °C)   Temp Source Oral   Pulse 92   Heart Rate Source Monitor   Respirations 18   BP (!) 193/77   MAP (Calculated) 116   Pain Assessment   Pain Assessment None - Denies Pain   Oxygen Therapy   SpO2 90 %   O2 Device None (Room air)       Lab Results   Component Value Date/Time    WBC 2.7 (L) 05/08/2024 05:30 AM    HGB 7.7 (L) 05/08/2024 05:30 AM    HGB 7.7 (L) 05/08/2024 05:30 AM    HCT 23.7 (L) 05/08/2024 05:30 AM    HCT 23.3 (L) 05/08/2024 05:30 AM     (L) 05/08/2024 05:30 AM     05/08/2024 05:30 AM    K 4.0 05/08/2024 05:30 AM    BUN 36 (H) 05/08/2024 05:30 AM    CREATININE 3.3 (H) 05/08/2024 05:30 AM    MG 1.90 05/07/2023 09:35 AM        AM Assessment completed.  Patient in bed.  Awake, Alert and oriented. Respirations easy unlabored. Encouraged cough and deep breathing. Patient is NPO for EGD.   Plan of care, education and safety measures reviewed and mutually agreed upon with the patient.   Calls appropriately. Needed items including call light in reach and exit alarm in place.

## 2024-05-08 NOTE — PROGRESS NOTES
small left pleural effusion. Adjacent dependent consolidation, likely atelectasis. 2. Mild bilateral interlobular septal thickening, possible reflecting interstitial pulmonary edema. 3. Aortic and coronary atherosclerosis.    CTA ABDOMEN PELVIS W CONTRAST    Result Date: 5/6/2024  EXAM: CTA ABDOMEN PELVIS W CONTRAST INDICATION: abd pain, melena, r/o active GI bleed, dialysis patient COMPARISON: None. TECHNIQUE: CT angiogram of the abdomen and pelvis was performed without and with contrast according to GI bleed protocol. Axial images, multiplanar reformatted images and maximum intensity projection images were reviewed. 3-D reconstructions were obtained. Up-to-date CT equipment and radiation dose reduction techniques were employed. IV Contrast: 100 cc Isovue 370 Oral Contrast: No. FINDINGS: There is no CTA evidence of active GI bleed. There is no aneurysm of the abdominal aorta. Abdominal aorta is severely atherosclerotic. The celiac artery and major branches are patent. The superior mesenteric artery is patent. Right and left renal arteries are patent. The inferior mesenteric artery is patent. There is severe atherosclerotic narrowing of the bilateral proximal superficial femoral arteries. There is a moderate right and small left pleural effusion. The heart is mildly enlarged without pericardial effusion. Liver is nodular in contour. Arterial phase images of the liver, spleen, pancreas, and right adrenal gland otherwise appear normal. There is a 3 cm left adrenal myelolipoma. Gallbladder is surgically absent. There is severe right and moderate left renal atrophy. No renal or ureteral calculi are seen. There is no hydronephrosis or hydroureter. The stomach is normal. The small bowel and large bowel are normal in course and caliber. There is no evidence of bowel wall thickening or bowel obstruction. The appendix appears normal. There is colonic diverticulosis without evidence of diverticulitis. No free intraperitoneal     TSH 0.58 05/06/2023 04:42 AM     Troponin: No results found for: \"TROPONINT\"  Lactic Acid: No results for input(s): \"LACTA\" in the last 72 hours.  BNP:   Recent Labs     05/06/24 1818   PROBNP 10,278*       UA:  Lab Results   Component Value Date/Time    NITRU Negative 05/07/2024 03:45 AM    COLORU Yellow 05/07/2024 03:45 AM    PHUR 6.0 05/07/2024 03:45 AM    WBCUA 0-2 05/07/2024 03:45 AM    RBCUA 0-2 05/07/2024 03:45 AM    BACTERIA Rare 05/07/2024 03:45 AM    CLARITYU Clear 05/07/2024 03:45 AM    LEUKOCYTESUR SMALL 05/07/2024 03:45 AM    UROBILINOGEN 0.2 05/07/2024 03:45 AM    BILIRUBINUR Negative 05/07/2024 03:45 AM    BLOODU Negative 05/07/2024 03:45 AM    GLUCOSEU Negative 05/07/2024 03:45 AM    KETUA Negative 05/07/2024 03:45 AM     Urine Cultures: No results found for: \"LABURIN\"  Blood Cultures: No results found for: \"BC\"  No results found for: \"BLOODCULT2\"  Organism: No results found for: \"ORG\"      Electronically signed by Blue Khalil MD on 5/8/2024 at 8:19 AM

## 2024-05-08 NOTE — ANESTHESIA PRE PROCEDURE
MD Antonino   glipiZIDE (GLUCOTROL XL) 2.5 MG extended release tablet glipizide ER 2.5 mg tablet, extended release 24 hr   1 daily    Antonino Read MD   HYDROcodone-acetaminophen (NORCO)  MG per tablet hydrocodone 10 mg-acetaminophen 325 mg tablet   TAKE 1 TABLET BY MOUTH 2x A DAY AS NEEDED FOR PAIN  Patient not taking: Reported on 5/6/2024    Antonino Read MD   indomethacin (INDOCIN) 50 MG suppository Indocin 50 mg rectal suppository   Insert 1 suppository 3 times a day by rectal route.  Patient not taking: Reported on 5/6/2024    Antonino Read MD   influenza A&B vaccine (FLUAD QUADRIVALENT) 0.5 ML PRSY injection Fluad Quad [id number](65yr up)(PF) 60 mcg (15 mcg x 4)/0.5mL IM syringe   PHARMACY ADMINISTERED  Patient not taking: Reported on 5/6/2024    Antonino Read MD   ipratropium 0.5 mg-albuterol 2.5 mg (DUONEB) 0.5-2.5 (3) MG/3ML SOLN nebulizer solution 3 mLs  Patient not taking: Reported on 5/6/2024    Antonino Reda MD   Loperamide-Simethicone (IMODIUM MULTI-SYMPTOM RELIEF) 2-125 MG TABS Imodium Multi-Symptom Relief 2 mg-125 mg tablet   Take 1 tablet 3 times a day by oral route as needed.  Patient not taking: Reported on 5/6/2024    Antonino Read MD   metroNIDAZOLE (FLAGYL) 500 MG tablet metronidazole 500 mg tablet  Patient not taking: Reported on 5/6/2024    Antonino Read MD   naproxen (NAPROSYN) 500 MG tablet naproxen 500 mg tablet  Patient not taking: Reported on 5/6/2024    Antonino Read MD   niacin (NIASPAN) 1000 MG extended release tablet Niaspan 1,000 mg tablet,extended release   Take 1 tablet twice a day by oral route.  Patient not taking: Reported on 5/6/2024    Antonino Read MD   nitrofurantoin, macrocrystal-monohydrate, (MACROBID) 100 MG capsule nitrofurantoin monohydrate/macrocrystals 100 mg capsule   TAKE 1 CAPSULE BY MOUTH EVERY 12 HOURS FOR 10 DAYS  Patient not taking: Reported on 5/6/2024    Antonino Read MD

## 2024-05-08 NOTE — FLOWSHEET NOTE
05/08/24 1515   Vital Signs   Temp 98.3 °F (36.8 °C)   Temp Source Oral   Pulse 72   Heart Rate Source Monitor   Respirations 20   BP (!) 175/67   MAP (Calculated) 103   Pain Assessment   Pain Assessment None - Denies Pain   Oxygen Therapy   SpO2 97 %   O2 Device None (Room air)     Returned from Endo. Requested and gave water. Needed items including call light in reach. Exit alarm in place.

## 2024-05-08 NOTE — PROGRESS NOTES
Left arm is swollen and weeping fluid. Arm elevated on pillows with a dry cloth. The swelling does seem better this afternoon.

## 2024-05-08 NOTE — PROGRESS NOTES
[employer] Message to Dr. Khalil  Last H/H 7.0/21.5. At what point would you consider transfusion? Next H/H @ 10pm    MD response: If it drops below 7 . Lets see her next hb

## 2024-05-08 NOTE — PROGRESS NOTES
Teams message to LALITHA Avila MA, that pt.is currently admitted to [facility] with GI bleed and will have push enteroscopy today.

## 2024-05-08 NOTE — PROGRESS NOTES
Nephrology Progress Note                                                                                                                                                                                                                                                                                                                                                               Office : [phone]     Fax :[phone]    Patient's Name: Valery Ramirez  8:42 AM  5/8/2024    Reason for Consult:  ESRD on HD  Requesting Physician:  Tori Mora MD (Inactive)  Chief Complaint:    Chief Complaint   Patient presents with    Abdominal Pain     Patient reports black stool and pain since Thursday, she is on blood thinners, dialysis patient MWF but missed Friday, she reports being short of breath today.        Assessment/Plan     # ESRD on HD   - Dialyzes on a MWF outpatient schedule via TDC  - Missed HD on Monday. Anticipate next HD tomorrow (off schedule here)  - Renally dose meds for ESRD  - Labs in AM    # HTN  - BP's elevated. Continue CCB. Imdur and Aldactone restarted   - Monitor     # Acute on chronic anemia   - In setting of GIB (see below)   - Transfuse per primary  - UMBERTO with HD     # Acid- base/ Electrolyte imbalance   - Mild acidosis will correct with HD - resolved   - Monitor lytes     # CHF  - Pleural effusions seen on imaging   - Continue HD with UF as able     # DM2  - Management per primary     # GI bleed  - GI consulted  - Plan for push endoscopy today       History of Present Ilness:    Valery Ramirez is a 73 y.o. female with a PMH of ESRD, CHF, DM2, HTN, COPD, and Afib on AC, who presents with abdominal pain and black tarry stools/diarrhea. Lab work-up in the ER revealed a Hgb of 5.5. She was given pRBC's and GI was consulted for further eval/management of GIB. We are consulted for ESRD on HD needing dialysis.     Interval hx:    Resting in bed  HD yesterday - terminated a little early d/t  hours.    Invalid input(s): \"LDLCALC\", \"LABVLDL\"  ABGs: No results for input(s): \"PHART\", \"PO2ART\", \"RUD9FUS\" in the last 72 hours.  INR:   Recent Labs     05/06/24  1818 05/07/24  0453   INR 1.52* 1.13       UA:  Recent Labs     05/07/24  0345   COLORU Yellow   CLARITYU Clear   GLUCOSEU Negative   BILIRUBINUR Negative   KETUA Negative   BLOODU Negative   PHUR 6.0   PROTEINU 30*   UROBILINOGEN 0.2   NITRU Negative   LEUKOCYTESUR SMALL*   URINETYPE NotGiven        Urine Microscopic:   Recent Labs     05/07/24  0345   BACTERIA Rare*   WBCUA 0-2   RBCUA 0-2       Urine Culture: No results for input(s): \"LABURIN\" in the last 72 hours.  Urine Chemistry: No results for input(s): \"CLUR\", \"LABCREA\", \"PROTEINUR\", \"NAUR\" in the last 72 hours.      IMAGING:  CT CHEST WO CONTRAST   Final Result   1. Moderate right and small left pleural effusion. Adjacent dependent   consolidation, likely atelectasis.   2. Mild bilateral interlobular septal thickening, possible reflecting   interstitial pulmonary edema.   3. Aortic and coronary atherosclerosis.      CTA ABDOMEN PELVIS W CONTRAST   Final Result      1.  No CT evidence of active GI bleed.   2.  Moderate right and small left pleural effusions.   3.  Suspected hepatic cirrhosis.   4.  Incidental left adrenal myelolipoma.   5.  Severe right and moderate left renal atrophy.   6.  Colonic diverticulosis without evidence of diverticulitis.   7.  Severe lumbar spondylosis.   8.  Severe atherosclerosis.         XR CHEST (2 VW)   Final Result      Cardiomegaly and pulmonary vascular congestion.   No focal consolidation.             Medical Decision Making:  The following items were considered in medical decision making:  Discussion of patient care with other providers  Reviewed clinical lab tests  Reviewed radiology tests  Reviewed other diagnostic tests/interventions    Vanna Munguia PA-C   [facility]  Office : [phone] or through [employer]  Fax

## 2024-05-09 LAB
ALBUMIN SERPL-MCNC: 3.1 G/DL (ref 3.4–5)
ALBUMIN/GLOB SERPL: 1.5 {RATIO} (ref 1.1–2.2)
ALP SERPL-CCNC: 96 U/L (ref 40–129)
ALT SERPL-CCNC: 6 U/L (ref 10–40)
ANION GAP SERPL CALCULATED.3IONS-SCNC: 11 MMOL/L (ref 3–16)
AST SERPL-CCNC: 16 U/L (ref 15–37)
BASOPHILS # BLD: 0 K/UL (ref 0–0.2)
BASOPHILS NFR BLD: 0.7 %
BILIRUB SERPL-MCNC: 0.6 MG/DL (ref 0–1)
BUN SERPL-MCNC: 39 MG/DL (ref 7–20)
CALCIUM SERPL-MCNC: 7.9 MG/DL (ref 8.3–10.6)
CHLORIDE SERPL-SCNC: 107 MMOL/L (ref 99–110)
CO2 SERPL-SCNC: 22 MMOL/L (ref 21–32)
CREAT SERPL-MCNC: 4.2 MG/DL (ref 0.6–1.2)
DEPRECATED RDW RBC AUTO: 19.4 % (ref 12.4–15.4)
EKG ATRIAL RATE: 71 BPM
EKG DIAGNOSIS: NORMAL
EKG P AXIS: 77 DEGREES
EKG P-R INTERVAL: 196 MS
EKG Q-T INTERVAL: 434 MS
EKG QRS DURATION: 112 MS
EKG QTC CALCULATION (BAZETT): 471 MS
EKG R AXIS: -1 DEGREES
EKG T AXIS: 125 DEGREES
EKG VENTRICULAR RATE: 71 BPM
EOSINOPHIL # BLD: 0.1 K/UL (ref 0–0.6)
EOSINOPHIL NFR BLD: 2.8 %
GFR SERPLBLD CREATININE-BSD FMLA CKD-EPI: 11 ML/MIN/{1.73_M2}
GLUCOSE BLD-MCNC: 102 MG/DL (ref 70–99)
GLUCOSE BLD-MCNC: 119 MG/DL (ref 70–99)
GLUCOSE BLD-MCNC: 191 MG/DL (ref 70–99)
GLUCOSE SERPL-MCNC: 97 MG/DL (ref 70–99)
HCT VFR BLD AUTO: 24.7 % (ref 36–48)
HGB BLD-MCNC: 8 G/DL (ref 12–16)
LYMPHOCYTES # BLD: 0.2 K/UL (ref 1–5.1)
LYMPHOCYTES NFR BLD: 7.6 %
MCH RBC QN AUTO: 29.7 PG (ref 26–34)
MCHC RBC AUTO-ENTMCNC: 32.6 G/DL (ref 31–36)
MCV RBC AUTO: 91.2 FL (ref 80–100)
MONOCYTES # BLD: 0.4 K/UL (ref 0–1.3)
MONOCYTES NFR BLD: 12 %
NEUTROPHILS # BLD: 2.4 K/UL (ref 1.7–7.7)
NEUTROPHILS NFR BLD: 76.9 %
PERFORMED ON: ABNORMAL
PLATELET # BLD AUTO: 125 K/UL (ref 135–450)
PMV BLD AUTO: 9.6 FL (ref 5–10.5)
POTASSIUM SERPL-SCNC: 4 MMOL/L (ref 3.5–5.1)
PROT SERPL-MCNC: 5.2 G/DL (ref 6.4–8.2)
RBC # BLD AUTO: 2.7 M/UL (ref 4–5.2)
SODIUM SERPL-SCNC: 140 MMOL/L (ref 136–145)
WBC # BLD AUTO: 3.2 K/UL (ref 4–11)

## 2024-05-09 PROCEDURE — A4216 STERILE WATER/SALINE, 10 ML: HCPCS | Performed by: FAMILY MEDICINE

## 2024-05-09 PROCEDURE — 6360000002 HC RX W HCPCS: Performed by: FAMILY MEDICINE

## 2024-05-09 PROCEDURE — 80053 COMPREHEN METABOLIC PANEL: CPT

## 2024-05-09 PROCEDURE — 90935 HEMODIALYSIS ONE EVALUATION: CPT

## 2024-05-09 PROCEDURE — 85025 COMPLETE CBC W/AUTO DIFF WBC: CPT

## 2024-05-09 PROCEDURE — 1200000000 HC SEMI PRIVATE

## 2024-05-09 PROCEDURE — 36415 COLL VENOUS BLD VENIPUNCTURE: CPT

## 2024-05-09 PROCEDURE — 99233 SBSQ HOSP IP/OBS HIGH 50: CPT | Performed by: INTERNAL MEDICINE

## 2024-05-09 PROCEDURE — 6370000000 HC RX 637 (ALT 250 FOR IP): Performed by: FAMILY MEDICINE

## 2024-05-09 PROCEDURE — C9113 INJ PANTOPRAZOLE SODIUM, VIA: HCPCS | Performed by: FAMILY MEDICINE

## 2024-05-09 PROCEDURE — 6360000002 HC RX W HCPCS: Performed by: NURSE PRACTITIONER

## 2024-05-09 PROCEDURE — 6360000002 HC RX W HCPCS: Performed by: PHYSICIAN ASSISTANT

## 2024-05-09 PROCEDURE — 2580000003 HC RX 258: Performed by: FAMILY MEDICINE

## 2024-05-09 PROCEDURE — 6370000000 HC RX 637 (ALT 250 FOR IP): Performed by: HOSPITALIST

## 2024-05-09 RX ORDER — AMLODIPINE BESYLATE 10 MG/1
10 TABLET ORAL DAILY
Status: DISCONTINUED | OUTPATIENT
Start: 2024-05-09 | End: 2024-05-10 | Stop reason: HOSPADM

## 2024-05-09 RX ORDER — ONDANSETRON 2 MG/ML
4 INJECTION INTRAMUSCULAR; INTRAVENOUS EVERY 6 HOURS PRN
Status: DISCONTINUED | OUTPATIENT
Start: 2024-05-09 | End: 2024-05-10 | Stop reason: HOSPADM

## 2024-05-09 RX ADMIN — TRAZODONE HYDROCHLORIDE 50 MG: 50 TABLET ORAL at 23:25

## 2024-05-09 RX ADMIN — ROPINIROLE HYDROCHLORIDE 1 MG: 1 TABLET, FILM COATED ORAL at 20:51

## 2024-05-09 RX ADMIN — SODIUM CHLORIDE, PRESERVATIVE FREE 40 MG: 5 INJECTION INTRAVENOUS at 20:51

## 2024-05-09 RX ADMIN — ISOSORBIDE MONONITRATE 60 MG: 60 TABLET, EXTENDED RELEASE ORAL at 08:39

## 2024-05-09 RX ADMIN — ISOSORBIDE MONONITRATE 30 MG: 30 TABLET, EXTENDED RELEASE ORAL at 20:51

## 2024-05-09 RX ADMIN — METOPROLOL SUCCINATE 50 MG: 50 TABLET, EXTENDED RELEASE ORAL at 08:40

## 2024-05-09 RX ADMIN — FLUOXETINE HYDROCHLORIDE 40 MG: 20 CAPSULE ORAL at 08:39

## 2024-05-09 RX ADMIN — HYDRALAZINE HYDROCHLORIDE 5 MG: 20 INJECTION INTRAMUSCULAR; INTRAVENOUS at 02:14

## 2024-05-09 RX ADMIN — AMLODIPINE BESYLATE 10 MG: 10 TABLET ORAL at 08:40

## 2024-05-09 RX ADMIN — SODIUM CHLORIDE, PRESERVATIVE FREE 40 MG: 5 INJECTION INTRAVENOUS at 08:39

## 2024-05-09 RX ADMIN — IRON SUCROSE 100 MG: 20 INJECTION, SOLUTION INTRAVENOUS at 14:13

## 2024-05-09 RX ADMIN — CILOSTAZOL 100 MG: 50 TABLET ORAL at 20:51

## 2024-05-09 RX ADMIN — CILOSTAZOL 100 MG: 50 TABLET ORAL at 08:40

## 2024-05-09 RX ADMIN — ONDANSETRON 4 MG: 2 INJECTION INTRAMUSCULAR; INTRAVENOUS at 22:31

## 2024-05-09 RX ADMIN — METOPROLOL SUCCINATE 50 MG: 50 TABLET, EXTENDED RELEASE ORAL at 20:51

## 2024-05-09 RX ADMIN — ATORVASTATIN CALCIUM 80 MG: 80 TABLET, FILM COATED ORAL at 08:39

## 2024-05-09 RX ADMIN — SPIRONOLACTONE 50 MG: 50 TABLET ORAL at 08:40

## 2024-05-09 ASSESSMENT — PAIN SCALES - GENERAL
PAINLEVEL_OUTOF10: 0
PAINLEVEL_OUTOF10: 0

## 2024-05-09 NOTE — PROGRESS NOTES
V2.0    [facility] Progress Note      Name:  Valery Ramirez /Age/Sex: 1950  (73 y.o. female)   MRN & CSN:  [id number] & [id number] Encounter Date/Time: 2024 9:51 AM EDT   Location:  4317/4317-01 PCP: Tori Mora MD (Inactive)     Attending:Blue Khalil MD       Hospital Day: 4    Assessment and Recommendations   Valery Ramirez is a 73 y.o. female with pmh of coronary artery disease (previously complicated by stent restenosis and pseudoaneurysm), end-stage renal disease on hemodialysis, COPD, hypertension, hyperlipidemia, type 2 diabetes , Afib, hx of DVT on Xarelto and hx of GI bleed 2/2 AVMS presented to the emergency room with complaints of abdominal pain and multiple episodes of black tarry stools.      Pt was admitted with GI bleed 2023. At that time EGD  showed a 1.5cm clean based ulcer through the pyloric channel. There were 5 angioectasias treated with APC with good effect. One oozed. 2 clips placed and hemostasis was achieved.    Hb dropped to 5.5 on admission. She was started on IV Protonix, Kcentra was given to reverse Xarelto and GI was consulted. Push enterscopy showed multiple AVMs in the jejunum treated with APCs       GI bleed  ABLA 2/2 above  Hx of duodenal ulcer  Hx of AVMs  -Received another unit at midnight . Hb stable at 8 this morning . Will monitor for one more day    Afib  Hx of DVT  -Per pt the DVT is a few months ago but per notes in was in . Discussed again and she insisted on that  -Xarelto on hold. It was reversed with Kcentra on admission  -Will restart when ok with GI.     End-stage renal disease on hemodialysis - per nephrology    Pleural effusions -moderate right and small left pleural effusions on CTA.  Most likely due to missed dialysis. She is not hypoxic and no complaints of SOB. Monitor for now     Elevated troponin- demand ischemia 2/2 acute anemia     DMII - cont sliding scale insulin. Glucose acceptable     COPD -without

## 2024-05-09 NOTE — PROGRESS NOTES
Physical Therapy/Occupational Therapy  Orders received and chart reviewed.  Pt currently at dialysis.  Will f/u later this date as schedule allows.  Nayla Eller, PT [id number]  Laurence Avalos, OTR/L, 1240

## 2024-05-09 NOTE — PROGRESS NOTES
Progress Note    Patient Valery Ramirez  MRN: [id number]  YOB: 1950 Age: 73 y.o. Sex: female  Room: [address] [place] [address]       Admitting Physician: Reymundo Farooq MD   Date of Admission: 5/6/2024  4:41 PM   Primary Care Physician: Tori Mora MD (Inactive)     Subjective:  Valery Ramirez was seen and examined. We are following for GIB. Drop in hemoglobin to 6.9 improved to 8 following transfusion of one unit. Dialysis today.     ROS:  Constitutional: Denies fever, no change in appetite  Respiratory: Denies cough or shortness of breath  Cardiovascular: Denies chest pain or edema    Objective:  Vital Signs:   Vitals:    05/09/24 0836   BP: 136/68   Pulse: 60   Resp: 16   Temp: 98.1 °F (36.7 °C)   SpO2: 97%         Physical Exam:  General: NAD  HEENT:   Cardiovascular: RRR, TDC in place  Respiratory: Respirations nonlabored  GI: Abd nd, ttp general     Intake/Output:    Intake/Output Summary (Last 24 hours) at 5/9/2024 1014  Last data filed at 5/9/2024 0428  Gross per 24 hour   Intake 393.33 ml   Output 450 ml   Net -56.67 ml        Current Medications:  Current Facility-Administered Medications   Medication Dose Route Frequency Provider Last Rate Last Admin    amLODIPine (NORVASC) tablet 10 mg  10 mg Oral Daily Blue Khalil MD   10 mg at 05/09/24 0840    iron sucrose (VENOFER) injection 100 mg  100 mg IntraVENous Once Vanna Munguia PA-C        cilostazol (PLETAL) tablet 100 mg  100 mg Oral BID Blue Khalil MD   100 mg at 05/09/24 0840    FLUoxetine (PROZAC) capsule 40 mg  40 mg Oral Daily Blue Khalil MD   40 mg at 05/09/24 0839    isosorbide mononitrate (IMDUR) extended release tablet 60 mg  60 mg Oral Daily Blue Khalil MD   60 mg at 05/09/24 0839    isosorbide mononitrate (IMDUR) extended release tablet 30 mg  30 mg Oral Nightly Blue Khalil MD   30 mg at 05/08/24 2009    metoprolol succinate (TOPROL XL) extended release tablet 50 mg  50 mg Oral BID Blue Khalil,  MD   50 mg at 05/09/24 0840    rOPINIRole (REQUIP) tablet 1 mg  1 mg Oral Nightly Blue Khalil MD   1 mg at 05/08/24 2009    spironolactone (ALDACTONE) tablet 50 mg  50 mg Oral Daily Blue Khalil MD   50 mg at 05/09/24 0840    traZODone (DESYREL) tablet 50 mg  50 mg Oral Nightly Blue Khalil MD   50 mg at 05/08/24 2009    0.9 % sodium chloride infusion   IntraVENous PRN Lisa Mahoney, APRN - NP        0.9 % sodium chloride infusion   IntraVENous PRN Reymundo Farooq MD        HYDROmorphone HCl PF (DILAUDID) injection 1 mg  1 mg IntraVENous Q4H PRN Reymundo Farooq MD   1 mg at 05/07/24 2155    atorvastatin (LIPITOR) tablet 80 mg  80 mg Oral Daily Reymundo Farooq MD   80 mg at 05/09/24 0839    hydrALAZINE (APRESOLINE) injection 5 mg  5 mg IntraVENous Q6H PRN Reymundo Farooq MD   5 mg at 05/09/24 0214    insulin lispro (HUMALOG) injection vial 0-4 Units  0-4 Units SubCUTAneous TID WC Reymundo Farooq MD        insulin lispro (HUMALOG) injection vial 0-4 Units  0-4 Units SubCUTAneous Nightly Reymundo Farooq MD        glucose chewable tablet 16 g  4 tablet Oral PRN Reymundo Farooq MD        dextrose bolus 10% 125 mL  125 mL IntraVENous PRN Reymundo Farooq MD        Or    dextrose bolus 10% 250 mL  250 mL IntraVENous PRN Reymundo Farooq MD        glucagon injection 1 mg  1 mg SubCUTAneous PRN Reymundo Farooq MD   1 mg at 05/08/24 1413    dextrose 10 % infusion   IntraVENous Continuous PRN Reymundo Farooq MD        0.9 % sodium chloride infusion   IntraVENous PRN Christina Mcclure PA-C        acetaminophen (TYLENOL) tablet 650 mg  650 mg Oral Q6H PRN Reymundo Farooq MD        Or    acetaminophen (TYLENOL) suppository 650 mg  650 mg Rectal Q6H PRN Reymundo Farooq MD        pantoprazole (PROTONIX) 40 mg in sodium chloride (PF) 0.9 % 10 mL injection  40 mg IntraVENous Q12H Reymundo Farooq MD   40 mg at 05/09/24 0839         Recent Imaging:   EGD  No dictation

## 2024-05-09 NOTE — PLAN OF CARE
Problem: Discharge Planning  Goal: Discharge to home or other facility with appropriate resources  5/8/2024 2140 by Rima Hamm RN  Outcome: Progressing  Flowsheets (Taken 5/8/2024 2140)  Discharge to home or other facility with appropriate resources:   Identify barriers to discharge with patient and caregiver   Arrange for needed discharge resources and transportation as appropriate   Identify discharge learning needs (meds, wound care, etc)     Problem: ABCDS Injury Assessment  Goal: Absence of physical injury  5/8/2024 2140 by Rima Hamm RN  Outcome: Progressing  Flowsheets (Taken 5/8/2024 2140)  Absence of Physical Injury: Implement safety measures based on patient assessment     Problem: Safety - Adult  Goal: Free from fall injury  5/8/2024 2140 by Rima Hamm RN  Outcome: Progressing     Problem: Pain  Goal: Verbalizes/displays adequate comfort level or baseline comfort level  5/8/2024 2140 by Rima Hamm RN  Outcome: Progressing  Flowsheets (Taken 5/8/2024 2140)  Verbalizes/displays adequate comfort level or baseline comfort level:   Encourage patient to monitor pain and request assistance   Assess pain using appropriate pain scale   Administer analgesics based on type and severity of pain and evaluate response   Implement non-pharmacological measures as appropriate and evaluate response     Problem: Nutrition Deficit:  Goal: Optimize nutritional status  5/8/2024 2140 by Rima Hamm RN  Outcome: Progressing     Problem: Chronic Conditions and Co-morbidities  Goal: Patient's chronic conditions and co-morbidity symptoms are monitored and maintained or improved  5/8/2024 2140 by Rima Hamm RN  Outcome: Progressing

## 2024-05-09 NOTE — PROGRESS NOTES
PRN  atorvastatin (LIPITOR) tablet 80 mg, Daily  hydrALAZINE (APRESOLINE) injection 5 mg, Q6H PRN  insulin lispro (HUMALOG) injection vial 0-4 Units, TID WC  insulin lispro (HUMALOG) injection vial 0-4 Units, Nightly  glucose chewable tablet 16 g, PRN  dextrose bolus 10% 125 mL, PRN   Or  dextrose bolus 10% 250 mL, PRN  glucagon injection 1 mg, PRN  dextrose 10 % infusion, Continuous PRN  0.9 % sodium chloride infusion, PRN  acetaminophen (TYLENOL) tablet 650 mg, Q6H PRN   Or  acetaminophen (TYLENOL) suppository 650 mg, Q6H PRN  pantoprazole (PROTONIX) 40 mg in sodium chloride (PF) 0.9 % 10 mL injection, Q12H      Physical exam:     Vitals:  /68   Pulse 60   Temp 98.1 °F (36.7 °C) (Oral)   Resp 16   Ht 1.626 m (5' 4\")   Wt 70.3 kg (154 lb 15.7 oz)   SpO2 97%   BMI 26.60 kg/m²   Constitutional:  OAA X3 NAD Yes  Skin: no rash, turgor wnl  Heent:  eomi, mmm  Neck: no bruits or jvd noted  Cardiovascular:  RRR, + murmur   Respiratory: Diminished BS. On RA  Abdomen:  soft, nt, nd  Ext:  lower extremity edema No  Psychiatric: mood and affect flat  Musculoskeletal:  Rom, muscular strength intact    Data:   Labs:  CBC:   Recent Labs     05/07/24  0453 05/07/24  1003 05/08/24  0530 05/08/24  1115 05/08/24  1625 05/08/24  2259 05/09/24  0416   WBC 2.9*  --  2.7*  --   --   --  3.2*   HGB 6.2*   < > 7.7*  7.7*   < > 7.0* 6.9* 8.0*   *  --  119*  --   --   --  125*    < > = values in this interval not displayed.       BMP:    Recent Labs     05/07/24  0453 05/08/24  0530 05/09/24  0416    140 140   K 4.1 4.0 4.0    106 107   CO2 18* 24 22   BUN 88* 36* 39*   CREATININE 5.6* 3.3* 4.2*   GLUCOSE 93 107* 97       Ca/Mg/Phos:   Recent Labs     05/07/24  0453 05/08/24  0530 05/09/24  0416   CALCIUM 7.8* 7.9* 7.9*   PHOS  --  4.7  --        Hepatic:   Recent Labs     05/07/24  0453 05/08/24  0530 05/09/24  0416   AST 11* 13* 16   ALT 6* 6* 6*   BILITOT 0.3 0.5 0.6   ALKPHOS 102 107 96       Troponin:

## 2024-05-09 NOTE — PROGRESS NOTES
Treatment time: 3.5 hours  Net UF: 2000 ml     Pre weight: 70.3 kg  Post weight:68.3 kg    Access used: cvc    Access function: good with  ml/min     Medications or blood products given: Venofer 100mg     Regular outpatient schedule: tts     Summary of response to treatment: Patient tolerated treatment well and without any complications. Patient remained stable throughout entire treatment. Copy of dialysis treatment record placed in chart, to be scanned into EMR.

## 2024-05-09 NOTE — CARE COORDINATION
10:20 AM  Patient is from home, no current home services. Patient plans to return home.   Patient needed blood transfusion overnight, per MD to monitor one more day for hemodynamic stability.   Therapy ordered for evaluations.   CM following.     Electronically signed by Jamar Blue RN, CM on 5/9/2024 at 10:21 AM.  Phone: [phone]  Fax: [phone]

## 2024-05-10 VITALS
TEMPERATURE: 98.3 F | OXYGEN SATURATION: 93 % | HEART RATE: 61 BPM | BODY MASS INDEX: 25.56 KG/M2 | HEIGHT: 64 IN | DIASTOLIC BLOOD PRESSURE: 67 MMHG | RESPIRATION RATE: 14 BRPM | SYSTOLIC BLOOD PRESSURE: 177 MMHG | WEIGHT: 149.69 LBS

## 2024-05-10 LAB
ALBUMIN SERPL-MCNC: 3.2 G/DL (ref 3.4–5)
ALBUMIN/GLOB SERPL: 1.5 {RATIO} (ref 1.1–2.2)
ALP SERPL-CCNC: 103 U/L (ref 40–129)
ALT SERPL-CCNC: <5 U/L (ref 10–40)
ANION GAP SERPL CALCULATED.3IONS-SCNC: 8 MMOL/L (ref 3–16)
AST SERPL-CCNC: 10 U/L (ref 15–37)
BASOPHILS # BLD: 0 K/UL (ref 0–0.2)
BASOPHILS NFR BLD: 0.3 %
BILIRUB SERPL-MCNC: 0.6 MG/DL (ref 0–1)
BUN SERPL-MCNC: 16 MG/DL (ref 7–20)
CALCIUM SERPL-MCNC: 7.9 MG/DL (ref 8.3–10.6)
CHLORIDE SERPL-SCNC: 106 MMOL/L (ref 99–110)
CO2 SERPL-SCNC: 26 MMOL/L (ref 21–32)
CREAT SERPL-MCNC: 2.9 MG/DL (ref 0.6–1.2)
DEPRECATED RDW RBC AUTO: 19.8 % (ref 12.4–15.4)
EOSINOPHIL # BLD: 0.1 K/UL (ref 0–0.6)
EOSINOPHIL NFR BLD: 2.9 %
GFR SERPLBLD CREATININE-BSD FMLA CKD-EPI: 16 ML/MIN/{1.73_M2}
GLUCOSE SERPL-MCNC: 89 MG/DL (ref 70–99)
HCT VFR BLD AUTO: 25.5 % (ref 36–48)
HGB BLD-MCNC: 8.5 G/DL (ref 12–16)
LYMPHOCYTES # BLD: 0.3 K/UL (ref 1–5.1)
LYMPHOCYTES NFR BLD: 11 %
MCH RBC QN AUTO: 30.1 PG (ref 26–34)
MCHC RBC AUTO-ENTMCNC: 33.2 G/DL (ref 31–36)
MCV RBC AUTO: 90.5 FL (ref 80–100)
MONOCYTES # BLD: 0.4 K/UL (ref 0–1.3)
MONOCYTES NFR BLD: 12.4 %
NEUTROPHILS # BLD: 2.2 K/UL (ref 1.7–7.7)
NEUTROPHILS NFR BLD: 73.4 %
PLATELET # BLD AUTO: 131 K/UL (ref 135–450)
PMV BLD AUTO: 9.7 FL (ref 5–10.5)
POTASSIUM SERPL-SCNC: 3.9 MMOL/L (ref 3.5–5.1)
PROT SERPL-MCNC: 5.3 G/DL (ref 6.4–8.2)
RBC # BLD AUTO: 2.82 M/UL (ref 4–5.2)
SODIUM SERPL-SCNC: 140 MMOL/L (ref 136–145)
WBC # BLD AUTO: 2.9 K/UL (ref 4–11)

## 2024-05-10 PROCEDURE — 6370000000 HC RX 637 (ALT 250 FOR IP): Performed by: HOSPITALIST

## 2024-05-10 PROCEDURE — 36415 COLL VENOUS BLD VENIPUNCTURE: CPT

## 2024-05-10 PROCEDURE — 97535 SELF CARE MNGMENT TRAINING: CPT

## 2024-05-10 PROCEDURE — 97165 OT EVAL LOW COMPLEX 30 MIN: CPT

## 2024-05-10 PROCEDURE — 97116 GAIT TRAINING THERAPY: CPT

## 2024-05-10 PROCEDURE — 97162 PT EVAL MOD COMPLEX 30 MIN: CPT

## 2024-05-10 PROCEDURE — 85025 COMPLETE CBC W/AUTO DIFF WBC: CPT

## 2024-05-10 PROCEDURE — 6360000002 HC RX W HCPCS: Performed by: FAMILY MEDICINE

## 2024-05-10 PROCEDURE — 2580000003 HC RX 258: Performed by: FAMILY MEDICINE

## 2024-05-10 PROCEDURE — C9113 INJ PANTOPRAZOLE SODIUM, VIA: HCPCS | Performed by: FAMILY MEDICINE

## 2024-05-10 PROCEDURE — 80053 COMPREHEN METABOLIC PANEL: CPT

## 2024-05-10 PROCEDURE — 99232 SBSQ HOSP IP/OBS MODERATE 35: CPT | Performed by: INTERNAL MEDICINE

## 2024-05-10 PROCEDURE — 6370000000 HC RX 637 (ALT 250 FOR IP): Performed by: FAMILY MEDICINE

## 2024-05-10 RX ORDER — AMLODIPINE BESYLATE 10 MG/1
10 TABLET ORAL DAILY
Qty: 30 TABLET | Refills: 3
Start: 2024-05-10

## 2024-05-10 RX ADMIN — FLUOXETINE HYDROCHLORIDE 40 MG: 20 CAPSULE ORAL at 08:28

## 2024-05-10 RX ADMIN — ISOSORBIDE MONONITRATE 60 MG: 60 TABLET, EXTENDED RELEASE ORAL at 08:29

## 2024-05-10 RX ADMIN — ATORVASTATIN CALCIUM 80 MG: 80 TABLET, FILM COATED ORAL at 08:29

## 2024-05-10 RX ADMIN — SODIUM CHLORIDE, PRESERVATIVE FREE 40 MG: 5 INJECTION INTRAVENOUS at 08:29

## 2024-05-10 RX ADMIN — METOPROLOL SUCCINATE 50 MG: 50 TABLET, EXTENDED RELEASE ORAL at 08:29

## 2024-05-10 RX ADMIN — CILOSTAZOL 100 MG: 50 TABLET ORAL at 08:29

## 2024-05-10 RX ADMIN — SPIRONOLACTONE 50 MG: 50 TABLET ORAL at 08:29

## 2024-05-10 RX ADMIN — AMLODIPINE BESYLATE 10 MG: 10 TABLET ORAL at 08:29

## 2024-05-10 ASSESSMENT — PAIN SCALES - GENERAL
PAINLEVEL_OUTOF10: 0
PAINLEVEL_OUTOF10: 0

## 2024-05-10 NOTE — PROGRESS NOTES
D/C instructions discussed with pt, changes in meds explained to pt who verbalized understanding.  Pt was changed into street clothes, PIV and tele disconnected from pt.    Pt was eventually d/c off unit via wheelchair.

## 2024-05-10 NOTE — PROGRESS NOTES
BP elevated for which scheduled BP meds administered per orders.    Pt a/o x4, denies having pain.    POC discussed with pt, questions/concerns addressed at this time.

## 2024-05-10 NOTE — PROGRESS NOTES
Occupational Therapy  Facility/Department: [address]  Occupational Therapy Initial Assessment/tx/discharge    Name: Valery Ramirez  : 1950  MRN: [id number]  Date of Service: 5/10/2024    Discharge Recommendations:     OT Equipment Recommendations  Equipment Needed: No       Patient Diagnosis(es): The encounter diagnosis was UGIB (upper gastrointestinal bleed).  Past Medical History:  has a past medical history of CHF (congestive heart failure) (HCC), COPD (chronic obstructive pulmonary disease) (HCC), Dysphagia, GI bleed, Gout, Hyperlipidemia, Hypertension, Polyp of colon, and Renal failure.  Past Surgical History:  has a past surgical history that includes Dialysis fistula creation; Total knee arthroplasty (Bilateral); Carpal tunnel release (Bilateral); Cholecystectomy; UPPP; Coronary angioplasty with stent; Enteroscopy (N/A, 2018); Upper gastrointestinal endoscopy (N/A, 2023); and Upper gastrointestinal endoscopy (N/A, 2024).           Assessment   Assessment: Pt admitted with GI bleed and is functioning close to baseline level.  She is indep/S for safety only with commode and shower stall transfer, indep with ADLs.  Prior to admission, pt resided alone in an apt and was indep with all ADLs, mobility, IADLs and driving.  She reports friends and family will check in on her.  No acute OT needs, d/c OT.  Recommend discharge to home with PRN assist.  Prognosis: Good  Decision Making: Low Complexity  REQUIRES OT FOLLOW-UP: No  Activity Tolerance  Activity Tolerance: Patient Tolerated treatment well        Plan   Occupational Therapy Plan  Additional Comments: d/c OT     Restrictions  Position Activity Restriction  Other position/activity restrictions: up as tolerated    Subjective   General  Chart Reviewed: Yes  Additional Pertinent Hx: 73 y.o. F admitted  with Abd pain, GI bleed, anemia. s/p  ENTEROSCOPY PUSH CONTROL HEMORRHAGE. Required multiple transfusions.-CXR=[id number] vascular

## 2024-05-10 NOTE — PLAN OF CARE
Problem: Discharge Planning  Goal: Discharge to home or other facility with appropriate resources  Outcome: Progressing     Problem: ABCDS Injury Assessment  Goal: Absence of physical injury  Outcome: Progressing     Problem: Safety - Adult  Goal: Free from fall injury  Outcome: Progressing     Problem: Pain  Goal: Verbalizes/displays adequate comfort level or baseline comfort level  Outcome: Progressing     Problem: Nutrition Deficit:  Goal: Optimize nutritional status  Outcome: Progressing     Problem: Chronic Conditions and Co-morbidities  Goal: Patient's chronic conditions and co-morbidity symptoms are monitored and maintained or improved  Outcome: Progressing

## 2024-05-10 NOTE — PROGRESS NOTES
Cognition   Orientation  Overall Orientation Status: Within Functional Limits     Objective                 AROM RLE (degrees)  RLE AROM: WFL  AROM LLE (degrees)  LLE AROM : WFL  Strength RLE  Strength RLE: WFL  Strength LLE  Strength LLE: WFL        Balance  Sitting: Intact  Standing: Intact  Bed mobility  Supine to Sit: Modified independent  Transfers  Sit to Stand: Stand by assistance  Stand to Sit: Stand by assistance  Ambulation  Device: No Device  Assistance: Contact guard assistance (to SBA)  Quality of Gait: decreased patrick, decreased step length/height, occas reaching out for UE support with longer distance  Distance: 10' x 2, 60'                 OutComes Score                                                  AM-PAC - Mobility    AM-PAC Basic Mobility - Inpatient   How much help is needed turning from your back to your side while in a flat bed without using bedrails?: None  How much help is needed moving from lying on your back to sitting on the side of a flat bed without using bedrails?: None  How much help is needed moving to and from a bed to a chair?: None  How much help is needed standing up from a chair using your arms?: None  How much help is needed walking in hospital room?: A Little  How much help is needed climbing 3-5 steps with a railing?: A Little  AM-[facility] Inpatient Mobility Raw Score : 22  AM-PAC Inpatient T-Scale Score : 53.28  Mobility Inpatient CMS 0-100% Score: 20.91  Mobility Inpatient CMS G-Code Modifier : CJ         Tinneti Score       Goals  Short Term Goals  Time Frame for Short Term Goals: No goals set - pt being d/c'd       Education  Patient Education  Education Given To: Patient  Education Provided: Role of Therapy;Plan of Care;IADL Safety  Education Provided Comments: activity at home, gradually increasing gt as able  Education Method: Verbal  Education Outcome: Verbalized understanding      Therapy Time   Individual Concurrent Group Co-treatment   Time In 0900         Time

## 2024-05-10 NOTE — PROGRESS NOTES
Nephrology Progress Note                                                                                                                                                                                                                                                                                                                                                               Office : [phone]     Fax :[phone]    Patient's Name: Valery Ramirez  11:10 AM  5/10/2024    Reason for Consult:  ESRD on HD  Requesting Physician:  Tori Mora MD (Inactive)  Chief Complaint:    Chief Complaint   Patient presents with    Abdominal Pain     Patient reports black stool and pain since Thursday, she is on blood thinners, dialysis patient MWF but missed Friday, she reports being short of breath today.        Assessment/Plan     # ESRD on HD   - Dialyzes on a MWF outpatient schedule via [facility]  - Missed HD on Monday. S/p HD yesterday (off schedule here)  - Renally dose meds for ESRD  - Labs in AM    # HTN  - BP's elevated. Continue CCB. Imdur and Aldactone restarted   - Monitor     # Acute on chronic anemia   - In setting of GIB (see below)   - Transfuse per primary  - UMBERTO with HD    # Acid- base/ Electrolyte imbalance   - Mild acidosis will correct with HD - resolved   - Monitor lytes     # CHF  - Pleural effusions seen on imaging   - Continue HD with UF as able     # DM2  - Management per primary     # GI bleed  - GI consulted  - S/p push endoscopy - multiple AVMs in jejunum s/p APCs      History of Present Ilness:    Valery Ramirez is a 73 y.o. female with a PMH of ESRD, CHF, DM2, HTN, COPD, and Afib on AC, who presents with abdominal pain and black tarry stools/diarrhea. Lab work-up in the ER revealed a Hgb of 5.5. She was given pRBC's and GI was consulted for further eval/management of GIB. We are consulted for ESRD on HD needing dialysis.     Interval hx:    HD yesterday with 2L removed  Lytes stable  Hgb  hours.  BNP: No results for input(s): \"BNP\" in the last 72 hours.  Lipids: No results for input(s): \"CHOL\", \"TRIG\", \"HDL\" in the last 72 hours.    Invalid input(s): \"LDLCALC\", \"LABVLDL\"  ABGs: No results for input(s): \"PHART\", \"PO2ART\", \"CGV2NEG\" in the last 72 hours.  INR:   No results for input(s): \"INR\" in the last 72 hours.    UA:  No results for input(s): \"COLORU\", \"CLARITYU\", \"GLUCOSEU\", \"BILIRUBINUR\", \"KETUA\", \"SPECGRAV\", \"BLOODU\", \"PHUR\", \"PROTEINU\", \"UROBILINOGEN\", \"NITRU\", \"LEUKOCYTESUR\", \"URINETYPE\" in the last 72 hours.    Invalid input(s): \"LABMICR\"     Urine Microscopic:   No results for input(s): \"LABCAST\", \"BACTERIA\", \"COMU\", \"HYALCAST\", \"WBCUA\", \"RBCUA\" in the last 72 hours.    Invalid input(s): \"EPIU\"    Urine Culture: No results for input(s): \"LABURIN\" in the last 72 hours.  Urine Chemistry: No results for input(s): \"CLUR\", \"LABCREA\", \"PROTEINUR\", \"NAUR\" in the last 72 hours.      IMAGING:  CT CHEST WO CONTRAST   Final Result   1. Moderate right and small left pleural effusion. Adjacent dependent   consolidation, likely atelectasis.   2. Mild bilateral interlobular septal thickening, possible reflecting   interstitial pulmonary edema.   3. Aortic and coronary atherosclerosis.      CTA ABDOMEN PELVIS W CONTRAST   Final Result      1.  No CT evidence of active GI bleed.   2.  Moderate right and small left pleural effusions.   3.  Suspected hepatic cirrhosis.   4.  Incidental left adrenal myelolipoma.   5.  Severe right and moderate left renal atrophy.   6.  Colonic diverticulosis without evidence of diverticulitis.   7.  Severe lumbar spondylosis.   8.  Severe atherosclerosis.         XR CHEST (2 VW)   Final Result      Cardiomegaly and pulmonary vascular congestion.   No focal consolidation.             Medical Decision Making:  The following items were considered in medical decision making:  Discussion of patient care with other providers  Reviewed clinical lab tests  Reviewed radiology

## 2024-05-10 NOTE — DISCHARGE SUMMARY
HYDROcodone-acetaminophen  MG per tablet  Commonly known as: NORCO     Imodium Multi-Symptom Relief 2-125 MG Tabs  Generic drug: Loperamide-Simethicone     Indocin 50 MG suppository  Generic drug: indomethacin     ipratropium 0.5 mg-albuterol 2.5 mg 0.5-2.5 (3) MG/3ML Soln nebulizer solution  Commonly known as: DUONEB     isosorbide dinitrate 30 MG tablet  Commonly known as: ISORDIL     Januvia 50 MG tablet  Generic drug: SITagliptin     KLOR-CON M20 PO     KRYSTEXXA IV     lidocaine 5 %  Commonly known as: LIDODERM     metOLazone 5 MG tablet  Commonly known as: ZAROXOLYN     metroNIDAZOLE 500 MG tablet  Commonly known as: FLAGYL     MoviPrep 100 g solution  Generic drug: PEG-KCl-NaCl-NaSulf-Na Asc-C     naproxen 500 MG tablet  Commonly known as: NAPROSYN     Niaspan 1000 MG extended release tablet  Generic drug: niacin     nitrofurantoin (macrocrystal-monohydrate) 100 MG capsule  Commonly known as: MACROBID     omeprazole 20 MG delayed release capsule  Commonly known as: PRILOSEC     ondansetron 4 MG disintegrating tablet  Commonly known as: ZOFRAN-ODT     oxyCODONE-acetaminophen 5-325 MG per tablet  Commonly known as: PERCOCET     pioglitazone 30 MG tablet  Commonly known as: ACTOS     promethazine-dextromethorphan 6.25-15 MG/5ML syrup  Commonly known as: PROMETHAZINE-DM     promethazine-phenyleph-codeine 6.25-5-10 MG/5ML Syrp syrup  Commonly known as: PHENERGAN VC w/CODEINE     Promethazine-Phenylephrine 6.25-5 MG/5ML syrup  Commonly known as: PHENERGAN-VC     Qualaquin 324 MG capsule  Generic drug: quiNINE     sevelamer 800 MG tablet  Commonly known as: RENVELA     Shingrix 50 MCG/0.5ML Susr injection  Generic drug: zoster recombinant adjuvanted vaccine     tiZANidine 2 MG tablet  Commonly known as: ZANAFLEX     tobramycin-dexAMETHasone 0.3-0.1 % ophthalmic suspension  Commonly known as: TOBRADEX     traMADol 50 MG tablet  Commonly known as: ULTRAM     triamcinolone 0.1 % ointment  Commonly known as:  KENALOG     Uloric 80 MG Tabs tablet  Generic drug: febuxostat     vitamin D 1.25 MG (87487 UT) Caps capsule  Commonly known as: ERGOCALCIFEROL     Zocor 10 MG tablet  Generic drug: simvastatin               Where to Get Your Medications        Information about where to get these medications is not yet available    Ask your nurse or doctor about these medications  amLODIPine 10 MG tablet        Objective Findings at Discharge:   BP (!) 177/67   Pulse 61   Temp 98.3 °F (36.8 °C) (Oral)   Resp 14   Ht 1.626 m (5' 4\")   Wt 67.9 kg (149 lb 11.1 oz)   SpO2 93%   BMI 25.69 kg/m²       Physical Exam:     General: NAD  Eyes: EOMI  ENT: neck supple  Cardiovascular: Regular rate.  Respiratory: Clear to auscultation  Gastrointestinal: Soft, non tender  Genitourinary: no suprapubic tenderness  Musculoskeletal: No edema  Skin: warm, dry  Neuro: Alert.  Psych: Mood appropriate.         Labs and Imaging   EGD    Result Date: 5/8/2024  No dictation     Colonoscopy    Result Date: 5/8/2024  No dictation     EGD    Result Date: 5/8/2024  No dictation     EGD    Result Date: 5/8/2024  No dictation     CT CHEST WO CONTRAST    Result Date: 5/7/2024  Chest CT without contrast HISTORY: Pleural effusion. Individualized dose optimization technique was used in order to meet ALARA standards for radiation dose reduction.  In addition to vendor specific dose reduction algorithms, the dose reduction techniques vary based on the specific scanner utilized but frequently include automated exposure control, adjustment of the mA and/or kV according to patient size, and use of iterative reconstruction technique. FINDINGS: There is a moderate right pleural effusion. A small left pleural effusion is present. There are areas of interlobular septal thickening bilaterally. Small amount of dependent consolidation is present in the lower lobes, likely atelectasis. There is no pneumothorax. No focal pulmonary nodule is evident. Aortic and coronary

## 2024-05-10 NOTE — PLAN OF CARE
Problem: Discharge Planning  Goal: Discharge to home or other facility with appropriate resources  5/10/2024 1001 by Mary Orta RN  Outcome: Adequate for Discharge  5/10/2024 0040 by Lana Mulligan RN  Outcome: Progressing     Problem: ABCDS Injury Assessment  Goal: Absence of physical injury  5/10/2024 1001 by Mary Orta RN  Outcome: Adequate for Discharge  Flowsheets (Taken 5/10/2024 0917)  Absence of Physical Injury: Implement safety measures based on patient assessment  5/10/2024 0040 by Lana Mulligan RN  Outcome: Progressing     Problem: Safety - Adult  Goal: Free from fall injury  5/10/2024 1001 by Mary Orta RN  Outcome: Adequate for Discharge  Flowsheets (Taken 5/10/2024 0917)  Free From Fall Injury:   Instruct family/caregiver on patient safety   Based on caregiver fall risk screen, instruct family/caregiver to ask for assistance with transferring infant if caregiver noted to have fall risk factors  5/10/2024 0040 by Lana Mulligan RN  Outcome: Progressing     Problem: Pain  Goal: Verbalizes/displays adequate comfort level or baseline comfort level  5/10/2024 1001 by Mary Orta RN  Outcome: Adequate for Discharge  5/10/2024 0040 by Lana Mulligan RN  Outcome: Progressing     Problem: Nutrition Deficit:  Goal: Optimize nutritional status  5/10/2024 1001 by Mary Orta RN  Outcome: Adequate for Discharge  5/10/2024 0040 by Lana Mulligan RN  Outcome: Progressing     Problem: Chronic Conditions and Co-morbidities  Goal: Patient's chronic conditions and co-morbidity symptoms are monitored and maintained or improved  5/10/2024 1001 by Mary Orta RN  Outcome: Adequate for Discharge  5/10/2024 0040 by Lana Mulligan RN  Outcome: Progressing

## 2024-05-10 NOTE — CARE COORDINATION
Case Management Assessment            Discharge Note                    Date / Time of Note: 5/10/2024 10:13 AM                  Discharge Note Completed by: Jamar Blue RN    Patient Name: Valery Ramirez   YOB: 1950  Diagnosis: GI bleed [K92.2]   Date / Time: 5/6/2024  4:41 PM    Current PCP: Tori Mora MD (Inactive)  Clinic patient: No    Hospitalization in the last 30 days: No       Advance Directives:  Code Status: Full Code  [place] DNR form completed and on chart: Not Indicated    Financial:  Payor: MEDICARE / Plan: MEDICARE PART A AND B / Product Type: *No Product type* /      Pharmacy:    [facility]/pharmacy #[id number] - [place], [place] - [address] [phone] - F [phone]  [address] [address]  Phone: [phone] Fax: [phone]      Assistance purchasing medications?: Potential Assistance Purchasing Medications: No  Assistance provided by Case Management: None at this time    Does patient want to participate in local refill/ meds to beds program?: Yes    Meds To Beds General Rules:  1. Can ONLY be done Monday- Friday between 8:30am-5pm  2. Prescription(s) must be in pharmacy by 3pm to be filled same day  3.Copy of patient's insurance/ prescription drug card and patient face sheet must be sent along with the prescription(s)  4. Cost of Rx cannot be added to hospital bill. If financial assistance is needed, please contact unit  or ;  or  CANNOT provide pharmacy voucher for patients co-pays  5. Patients can then  the prescription on their way out of the hospital at discharge, or pharmacy can deliver to the bedside if staff is available. (payment due at time of pick-up or delivery - cash, check, or card accepted)     Able to afford home medications/ co-pay costs: Yes    ADLS:  Current PT AM-PAC Score:   /24  Current OT AM-PAC Score:   /24      DISCHARGE Disposition: Home- No Services Needed    RN TO  CALL REPORT TO: NA      LOC at discharge: Not Applicable  TEDDY Completed: Yes    Notification completed in HENS/PAS?:  Not Applicable    IMM Completed:   Yes, Case management has presented and reviewed IMM letter #2 to the patient and/or family/ POA. Patient and/or family/POA verbalized understanding of their medicare rights and appeal process if needed. Patient and/or family/POA verbalized understanding of DC within 4 hours of signing IMM letter #2. Patient and/or family/POA has signed and placed today's date (5/10 ) and time (0935) on IMM letter #2 on the the appropriate lines. Patient and/or family/POA, provided copy of letter and they are aware that original copy of IMM letter #2 is available prior to discharge from the paper chart on the unit.  Electronic documentation has been entered into epic for IMM letter #2 and original paper copy has been added to the paper chart at the nurses station.         Transportation:  Transportation PLAN for discharge: family   Mode of Transport: Private Car    Additional CM Notes: patient worked with therapy and therapy s/o. No DM Eneeds. Met with patient at bedside agreeable to dc today. Granddaughter to transport at dc. Patient excited to dc.     CM provided and explained IMM and 4 hour window to Pt. They expressed understanding of their rights to appeal and that they may/will leave the hospital w/o having received the IMM letter 4 hours prior to DC. CM provided Pt a copy of IMM and placed original signed copy on the paperchart. Pt is in agreement w/DC to Home today.        COVID Result:  No results found for: \"COVID19\"        The Plan for Transition of Care is related to the following treatment goals of GI bleed [K92.2]    The Patient and/or patient representative Valery and her family were provided with a choice of provider and agrees with the discharge plan Yes    Freedom of choice list was provided with basic dialogue that supports the patient's individualized plan of

## 2024-05-13 LAB
EKG ATRIAL RATE: 71 BPM
EKG DIAGNOSIS: NORMAL
EKG P AXIS: 77 DEGREES
EKG P-R INTERVAL: 196 MS
EKG Q-T INTERVAL: 434 MS
EKG QRS DURATION: 112 MS
EKG QTC CALCULATION (BAZETT): 471 MS
EKG R AXIS: -1 DEGREES
EKG T AXIS: 125 DEGREES
EKG VENTRICULAR RATE: 71 BPM

## 2024-05-14 NOTE — PROGRESS NOTES
Name_______________________________________Printed:____________________  Date and time of surgery____5/23/24  0900____________________Arrival Time:____0700  main____________   1. The instructions given regarding when and if a patient needs to stop oral intake prior to surgery varies.Follow the specific instructions you were given                  XXX___Nothing to eat or to drink after Midnight the night before.                    2. Take the following pills with a small sip of water on the morning of surgery______take amlodipine, imdur, metoprolol am of procedure_____________________________________________                  Do not take blood pressure medications ending in pril or sartan the gladys prior to surgery or the morning of surgery. Dr Morrison's patient are not to take any medications the AM of surgery.         3. Aspirin, Ibuprofen, Advil, Naproxen, Vitamin E and other Anti-inflammatory products and supplements should be stopped for 5 -7days before surgery or as directed by your physician.   4. Check with your Doctor regarding stopping Plavix, Coumadin,Eliquis, Lovenox,Effient,Pradaxa,Xarelto, Fragmin or other blood thinners and follow their instructions-CHECK XARELTO, PLETAL, PLAVIX   5. Do not smoke, and do not drink any alcoholic beverages 24 hours prior to surgery.  This includes NA Beer.Refrain from the usage of any recreational drugs.   6. You may brush your teeth and gargle the morning of surgery.  DO NOT SWALLOW WATER   7. You MUST make arrangements for a responsible adult to stay on site while you are here and take you home after your surgery. You will not be allowed to leave alone or drive yourself home.  It is strongly suggested someone stay with you the first 24 hrs. Your surgery will be cancelled if you do not have a ride home.   8. A parent/legal guardian must accompany a child scheduled for surgery and plan to stay at the hospital until the child is discharged.  Please do not bring other children  take half of your usual  dose the night  before your procedure              22. If you use a c-pap please bring DOS if staying overnight,             23.For your convenience [facility] has a pharmacy on site to fill your prescriptions.             24. If you use oxygen and have a portable tank please bring it  with you the DOS             25. Bring a complete list of all your medications with name and dose include any supplements.             26. Other__________________________________________   *Please call pre admission testing if you any further questions   [facility]         [phone]   [place] [phone]   [place]            [phone]    [facility]  [phone]   [facility]   [phone]       VISITOR POLICY(subject to change)    Current policy is 2 visitors per patient. No children. Mask is  at the discretion of the facility. Visiting hours are 8a-8p. Overnight visitors will be at the discretion of the nurse. All policies subject to change.      All above information reviewed with patient in person or by phone.Patient verbalizes understanding.All questions and concerns addressed.                                                                                                 Patient/Rep____________________

## 2024-05-23 ENCOUNTER — ANESTHESIA (OUTPATIENT)
Dept: OPERATING ROOM | Age: 74
End: 2024-05-23
Payer: MEDICARE

## 2024-05-23 ENCOUNTER — HOSPITAL ENCOUNTER (OUTPATIENT)
Age: 74
Setting detail: OUTPATIENT SURGERY
Discharge: HOME | End: 2024-05-23
Attending: SURGERY | Admitting: SURGERY
Payer: MEDICARE

## 2024-05-23 ENCOUNTER — ANESTHESIA EVENT (OUTPATIENT)
Dept: OPERATING ROOM | Age: 74
End: 2024-05-23
Payer: MEDICARE

## 2024-05-23 VITALS
HEIGHT: 64 IN | BODY MASS INDEX: 25.1 KG/M2 | WEIGHT: 147 LBS | TEMPERATURE: 97.5 F | HEART RATE: 57 BPM | RESPIRATION RATE: 16 BRPM | DIASTOLIC BLOOD PRESSURE: 59 MMHG | OXYGEN SATURATION: 99 % | SYSTOLIC BLOOD PRESSURE: 169 MMHG

## 2024-05-23 DIAGNOSIS — N18.6 ESRD (END STAGE RENAL DISEASE) ON DIALYSIS (HCC): Primary | ICD-10-CM

## 2024-05-23 DIAGNOSIS — Z99.2 ESRD (END STAGE RENAL DISEASE) ON DIALYSIS (HCC): Primary | ICD-10-CM

## 2024-05-23 LAB
ABO + RH BLD: NORMAL
ANION GAP SERPL CALCULATED.3IONS-SCNC: 13 MMOL/L (ref 3–16)
APTT BLD: >180 SEC (ref 22.1–36.4)
BLD GP AB SCN SERPL QL: NORMAL
BUN SERPL-MCNC: 35 MG/DL (ref 7–20)
CALCIUM SERPL-MCNC: 8.1 MG/DL (ref 8.3–10.6)
CHLORIDE SERPL-SCNC: 102 MMOL/L (ref 99–110)
CO2 SERPL-SCNC: 28 MMOL/L (ref 21–32)
CREAT SERPL-MCNC: 3.7 MG/DL (ref 0.6–1.2)
DEPRECATED RDW RBC AUTO: 21.4 % (ref 12.4–15.4)
GFR SERPLBLD CREATININE-BSD FMLA CKD-EPI: 12 ML/MIN/{1.73_M2}
GLUCOSE SERPL-MCNC: 94 MG/DL (ref 70–99)
HCT VFR BLD AUTO: 33 % (ref 36–48)
HGB BLD-MCNC: 10.6 G/DL (ref 12–16)
INR PPP: 1.09 (ref 0.85–1.15)
MCH RBC QN AUTO: 30.4 PG (ref 26–34)
MCHC RBC AUTO-ENTMCNC: 32.1 G/DL (ref 31–36)
MCV RBC AUTO: 94.8 FL (ref 80–100)
PLATELET # BLD AUTO: 109 K/UL (ref 135–450)
PMV BLD AUTO: 10.3 FL (ref 5–10.5)
POTASSIUM SERPL-SCNC: 4 MMOL/L (ref 3.5–5.1)
PROTHROMBIN TIME: 14.3 SEC (ref 11.9–14.9)
RBC # BLD AUTO: 3.48 M/UL (ref 4–5.2)
SODIUM SERPL-SCNC: 143 MMOL/L (ref 136–145)
WBC # BLD AUTO: 3.2 K/UL (ref 4–11)

## 2024-05-23 PROCEDURE — 2580000003 HC RX 258: Performed by: SURGERY

## 2024-05-23 PROCEDURE — 86900 BLOOD TYPING SEROLOGIC ABO: CPT

## 2024-05-23 PROCEDURE — 3700000000 HC ANESTHESIA ATTENDED CARE: Performed by: SURGERY

## 2024-05-23 PROCEDURE — 36821 AV FUSION DIRECT ANY SITE: CPT | Performed by: SURGERY

## 2024-05-23 PROCEDURE — 2709999900 HC NON-CHARGEABLE SUPPLY: Performed by: SURGERY

## 2024-05-23 PROCEDURE — 7100000000 HC PACU RECOVERY - FIRST 15 MIN: Performed by: SURGERY

## 2024-05-23 PROCEDURE — 7100000011 HC PHASE II RECOVERY - ADDTL 15 MIN: Performed by: SURGERY

## 2024-05-23 PROCEDURE — 3700000001 HC ADD 15 MINUTES (ANESTHESIA): Performed by: SURGERY

## 2024-05-23 PROCEDURE — 36415 COLL VENOUS BLD VENIPUNCTURE: CPT

## 2024-05-23 PROCEDURE — 6360000002 HC RX W HCPCS: Performed by: NURSE ANESTHETIST, CERTIFIED REGISTERED

## 2024-05-23 PROCEDURE — 3600000004 HC SURGERY LEVEL 4 BASE: Performed by: SURGERY

## 2024-05-23 PROCEDURE — 6360000002 HC RX W HCPCS: Performed by: SURGERY

## 2024-05-23 PROCEDURE — 6370000000 HC RX 637 (ALT 250 FOR IP): Performed by: SURGERY

## 2024-05-23 PROCEDURE — A4217 STERILE WATER/SALINE, 500 ML: HCPCS | Performed by: SURGERY

## 2024-05-23 PROCEDURE — 7100000001 HC PACU RECOVERY - ADDTL 15 MIN: Performed by: SURGERY

## 2024-05-23 PROCEDURE — C1889 IMPLANT/INSERT DEVICE, NOC: HCPCS | Performed by: SURGERY

## 2024-05-23 PROCEDURE — 85730 THROMBOPLASTIN TIME PARTIAL: CPT

## 2024-05-23 PROCEDURE — 86850 RBC ANTIBODY SCREEN: CPT

## 2024-05-23 PROCEDURE — 85610 PROTHROMBIN TIME: CPT

## 2024-05-23 PROCEDURE — 85027 COMPLETE CBC AUTOMATED: CPT

## 2024-05-23 PROCEDURE — 6360000002 HC RX W HCPCS: Performed by: ANESTHESIOLOGY

## 2024-05-23 PROCEDURE — 7100000010 HC PHASE II RECOVERY - FIRST 15 MIN: Performed by: SURGERY

## 2024-05-23 PROCEDURE — 86901 BLOOD TYPING SEROLOGIC RH(D): CPT

## 2024-05-23 PROCEDURE — 3600000014 HC SURGERY LEVEL 4 ADDTL 15MIN: Performed by: SURGERY

## 2024-05-23 PROCEDURE — 80048 BASIC METABOLIC PNL TOTAL CA: CPT

## 2024-05-23 DEVICE — CLIP LIG M BLU TI HRT SHP WIRE HORZ 6 CLIPS PER PK: Type: IMPLANTABLE DEVICE | Site: ARM | Status: FUNCTIONAL

## 2024-05-23 DEVICE — IMPLANTABLE DEVICE: Type: IMPLANTABLE DEVICE | Site: ARM | Status: FUNCTIONAL

## 2024-05-23 RX ORDER — PHENYLEPHRINE HCL IN 0.9% NACL 1 MG/10 ML
SYRINGE (ML) INTRAVENOUS PRN
Status: DISCONTINUED | OUTPATIENT
Start: 2024-05-23 | End: 2024-05-23 | Stop reason: SDUPTHER

## 2024-05-23 RX ORDER — SODIUM CHLORIDE 0.9 % (FLUSH) 0.9 %
5-40 SYRINGE (ML) INJECTION EVERY 12 HOURS SCHEDULED
Status: DISCONTINUED | OUTPATIENT
Start: 2024-05-23 | End: 2024-05-23 | Stop reason: HOSPADM

## 2024-05-23 RX ORDER — FENTANYL CITRATE 50 UG/ML
INJECTION, SOLUTION INTRAMUSCULAR; INTRAVENOUS PRN
Status: DISCONTINUED | OUTPATIENT
Start: 2024-05-23 | End: 2024-05-23 | Stop reason: SDUPTHER

## 2024-05-23 RX ORDER — HEPARIN SODIUM 1000 [USP'U]/ML
1800 INJECTION, SOLUTION INTRAVENOUS; SUBCUTANEOUS ONCE
Status: COMPLETED | OUTPATIENT
Start: 2024-05-23 | End: 2024-05-23

## 2024-05-23 RX ORDER — SODIUM CHLORIDE 0.9 % (FLUSH) 0.9 %
5-40 SYRINGE (ML) INJECTION PRN
Status: DISCONTINUED | OUTPATIENT
Start: 2024-05-23 | End: 2024-05-23 | Stop reason: HOSPADM

## 2024-05-23 RX ORDER — DEXAMETHASONE SODIUM PHOSPHATE 4 MG/ML
INJECTION, SOLUTION INTRA-ARTICULAR; INTRALESIONAL; INTRAMUSCULAR; INTRAVENOUS; SOFT TISSUE PRN
Status: DISCONTINUED | OUTPATIENT
Start: 2024-05-23 | End: 2024-05-23 | Stop reason: SDUPTHER

## 2024-05-23 RX ORDER — SODIUM CHLORIDE 9 MG/ML
INJECTION, SOLUTION INTRAVENOUS CONTINUOUS
Status: DISCONTINUED | OUTPATIENT
Start: 2024-05-23 | End: 2024-05-23 | Stop reason: HOSPADM

## 2024-05-23 RX ORDER — ONDANSETRON 2 MG/ML
INJECTION INTRAMUSCULAR; INTRAVENOUS PRN
Status: DISCONTINUED | OUTPATIENT
Start: 2024-05-23 | End: 2024-05-23 | Stop reason: SDUPTHER

## 2024-05-23 RX ORDER — LABETALOL HYDROCHLORIDE 5 MG/ML
10 INJECTION, SOLUTION INTRAVENOUS
Status: DISCONTINUED | OUTPATIENT
Start: 2024-05-23 | End: 2024-05-23 | Stop reason: HOSPADM

## 2024-05-23 RX ORDER — NALOXONE HYDROCHLORIDE 0.4 MG/ML
INJECTION, SOLUTION INTRAMUSCULAR; INTRAVENOUS; SUBCUTANEOUS PRN
Status: DISCONTINUED | OUTPATIENT
Start: 2024-05-23 | End: 2024-05-23 | Stop reason: HOSPADM

## 2024-05-23 RX ORDER — HYDRALAZINE HYDROCHLORIDE 20 MG/ML
10 INJECTION INTRAMUSCULAR; INTRAVENOUS
Status: DISCONTINUED | OUTPATIENT
Start: 2024-05-23 | End: 2024-05-23 | Stop reason: HOSPADM

## 2024-05-23 RX ORDER — PROPOFOL 10 MG/ML
INJECTION, EMULSION INTRAVENOUS PRN
Status: DISCONTINUED | OUTPATIENT
Start: 2024-05-23 | End: 2024-05-23 | Stop reason: SDUPTHER

## 2024-05-23 RX ORDER — ONDANSETRON 2 MG/ML
4 INJECTION INTRAMUSCULAR; INTRAVENOUS
Status: DISCONTINUED | OUTPATIENT
Start: 2024-05-23 | End: 2024-05-23 | Stop reason: HOSPADM

## 2024-05-23 RX ORDER — GLYCOPYRROLATE 0.2 MG/ML
INJECTION INTRAMUSCULAR; INTRAVENOUS PRN
Status: DISCONTINUED | OUTPATIENT
Start: 2024-05-23 | End: 2024-05-23 | Stop reason: SDUPTHER

## 2024-05-23 RX ORDER — HYDROMORPHONE HYDROCHLORIDE 2 MG/ML
0.25 INJECTION, SOLUTION INTRAMUSCULAR; INTRAVENOUS; SUBCUTANEOUS EVERY 5 MIN PRN
Status: DISCONTINUED | OUTPATIENT
Start: 2024-05-23 | End: 2024-05-23 | Stop reason: HOSPADM

## 2024-05-23 RX ORDER — OXYCODONE HYDROCHLORIDE 5 MG/1
5 TABLET ORAL EVERY 6 HOURS PRN
Qty: 20 TABLET | Refills: 0 | Status: SHIPPED | OUTPATIENT
Start: 2024-05-23 | End: 2024-05-28

## 2024-05-23 RX ORDER — SODIUM CHLORIDE 9 MG/ML
INJECTION, SOLUTION INTRAVENOUS PRN
Status: DISCONTINUED | OUTPATIENT
Start: 2024-05-23 | End: 2024-05-23 | Stop reason: HOSPADM

## 2024-05-23 RX ORDER — OXYCODONE HYDROCHLORIDE 5 MG/1
5 TABLET ORAL
Status: DISCONTINUED | OUTPATIENT
Start: 2024-05-23 | End: 2024-05-23 | Stop reason: HOSPADM

## 2024-05-23 RX ADMIN — SODIUM CHLORIDE: 9 INJECTION, SOLUTION INTRAVENOUS at 07:38

## 2024-05-23 RX ADMIN — GLYCOPYRROLATE 0.2 MG: 0.2 INJECTION INTRAMUSCULAR; INTRAVENOUS at 08:47

## 2024-05-23 RX ADMIN — PROPOFOL 20 MG: 10 INJECTION, EMULSION INTRAVENOUS at 08:54

## 2024-05-23 RX ADMIN — FENTANYL CITRATE 50 MCG: 50 INJECTION, SOLUTION INTRAMUSCULAR; INTRAVENOUS at 08:30

## 2024-05-23 RX ADMIN — ONDANSETRON 4 MG: 2 INJECTION INTRAMUSCULAR; INTRAVENOUS at 08:36

## 2024-05-23 RX ADMIN — FENTANYL CITRATE 25 MCG: 50 INJECTION, SOLUTION INTRAMUSCULAR; INTRAVENOUS at 09:05

## 2024-05-23 RX ADMIN — DEXAMETHASONE SODIUM PHOSPHATE 8 MG: 4 INJECTION, SOLUTION INTRAMUSCULAR; INTRAVENOUS at 08:36

## 2024-05-23 RX ADMIN — Medication 100 MCG: at 08:47

## 2024-05-23 RX ADMIN — Medication 50 MCG: at 09:20

## 2024-05-23 RX ADMIN — CEFAZOLIN 2000 MG: 2 INJECTION, POWDER, FOR SOLUTION INTRAMUSCULAR; INTRAVENOUS at 08:25

## 2024-05-23 RX ADMIN — PROPOFOL 150 MG: 10 INJECTION, EMULSION INTRAVENOUS at 08:30

## 2024-05-23 RX ADMIN — HEPARIN SODIUM 1800 UNITS: 1000 INJECTION INTRAVENOUS; SUBCUTANEOUS at 11:25

## 2024-05-23 RX ADMIN — Medication 50 MCG: at 08:41

## 2024-05-23 RX ADMIN — Medication 100 MCG: at 09:26

## 2024-05-23 RX ADMIN — GLYCOPYRROLATE 0.2 MG: 0.2 INJECTION INTRAMUSCULAR; INTRAVENOUS at 08:41

## 2024-05-23 RX ADMIN — Medication 100 MCG: at 08:58

## 2024-05-23 ASSESSMENT — LIFESTYLE VARIABLES: SMOKING_STATUS: 1

## 2024-05-23 ASSESSMENT — PAIN - FUNCTIONAL ASSESSMENT
PAIN_FUNCTIONAL_ASSESSMENT: 0-10
PAIN_FUNCTIONAL_ASSESSMENT: 0-10

## 2024-05-23 ASSESSMENT — COPD QUESTIONNAIRES: CAT_SEVERITY: MILD

## 2024-05-23 NOTE — DISCHARGE INSTRUCTIONS
Follow up with Dr. Woods with any questions, concerns, results, and an appointment after your procedure in the time period recommended.   [phone]      When you sleep, avoid placing pressure on the extremity with the fistula or graft.  Never allow anyone to take a blood pressure or perform a venipuncture on the extremity with the fistula or graft.  Do not wear tight clothing above or around the access site.      Turbulent blood flow over the AV fistula or AV graft is commonly felt as a vibration and is termed a thrill. Routinely check the access site for the thrill, which indicates that the AV site is still functioning (if the vibration or thrill disappears, call your health care provider immediately).     Call your doctor immediately or come to the Emergency Department if you:    cannot feel the vibration or thrill at the access site    have severe chills or fever    have excessive bleeding from the access site    have excessive swelling    pain at the access site that is not relieved by pain medication           TOPICAL SKIN ADHESIVE CARE    5/23/2024    This is a clear, purple, sterile liquid skin adhesive that holds wound/incision edges together.    The adhesive will usually remain in place for 5 to 10 days, then naturally wear or slough off your skin.    Do not scratch, rub, or pick at the adhesive film.    Do not apply liquid or ointment medications, soap, powders or lotions to the incision while the adhesive film is in place.    You may shower 24 hours after your surgery and briefly wet the adhesive film.  Do not sit in a tub of water or swim.  Do not soak or scrub your incision.  After showering, gently blot your incision dry.    No tape or any type of bandage/covering is required over the adhesive film.       SEDATION DISCHARGE INSTRUCTIONS   5/23/2024    Wear your seatbelt home.  You are under the influence of drugs. Do not drink alcohol, drive, operate machinery, or make any important decisions or  sign any legal documents for 24 hours  A responsible adult needs to be with you for 24 hours.  You may experience lightheadedness, dizziness, nausea, heightened emotions and/or sleepiness following surgery.  Rest at home today- increase activity as tolerated.  Progress slowly to a regular diet and drink plenty of fluids unless your physician has instructed you otherwise.  If nausea becomes a problem, call your physician.  Coughing, sore throat, and muscle aches are other side effects of anesthesia and should improve with time.  Do not drive or operate machinery while taking narcotics.

## 2024-05-23 NOTE — PROGRESS NOTES
Pt doing well. Tolerating PO intake of water and and jello, complaining 2/3 surgical pain. Phase 1 criteria met. Will transfer to [facility] for discharge.

## 2024-05-23 NOTE — PROGRESS NOTES
Pt to PACU from OR. 6 L simple mask in place. R radial pulse doppler used. Fistula site CDI, with glue, bruit and thrill noted.

## 2024-05-23 NOTE — ANESTHESIA POSTPROCEDURE EVALUATION
Department of Anesthesiology  Postprocedure Note    Patient: Valery Ramirez  MRN: [id number]  YOB: 1950  Date of evaluation: 5/23/2024    Procedure Summary       Date: 05/23/24 Room / Location: [address]    Anesthesia Start: 0823 Anesthesia Stop: 0945    Procedure: RIGHT ARM ARTERIOVENOUS FISTULA CREATION (Right) Diagnosis:       End stage renal disease (HCC)      (End stage renal disease (HCC) [N18.6])    Surgeons: Cornelius Woods II, MD Responsible Provider: Lam Barton MD    Anesthesia Type: general ASA Status: 4            Anesthesia Type: No value filed.    Marquita Phase I: Marquita Score: 10    Marquita Phase II:      Anesthesia Post Evaluation    Patient location during evaluation: PACU  Patient participation: complete - patient participated  Level of consciousness: awake and alert  Airway patency: patent  Nausea & Vomiting: no vomiting and no nausea  Cardiovascular status: hemodynamically stable  Respiratory status: acceptable  Hydration status: stable  Pain management: adequate    No notable events documented.

## 2024-05-23 NOTE — OP NOTE
Operative Note      Patient: Valery Ramirez  YOB: 1950  MRN: [id number]    Date of Procedure: 5/23/2024    Pre-Op Diagnosis Codes:     * End stage renal disease (HCC) [N18.6]    Post-Op Diagnosis: Same       Procedure(s):  RIGHT ARM ARTERIOVENOUS FISTULA CREATION    Surgeon(s):  Cornelius Woods II, MD    Assistant:   Surgical Assistant: Alma Vazquez    Anesthesia: General    Estimated Blood Loss (mL): Minimal    Complications: None    Specimens:   * No specimens in log *    Implants:  Implant Name Type Inv. Item Serial No.  Lot No. LRB No. Used Action   CLIP LIG M LAY TI HRT SHP WIRE HORZ 6 CLIPS PER PK - [id number]  CLIP LIG M LAY TI HRT SHP WIRE HORZ 6 CLIPS PER PK  [employer]  Right 1 Implanted   CLIP INT WECK SM WIDE RED TI TRNSVRS GRV CHEVRON SHP W/ PERCIS TIP 6 PER PK - [id number]  CLIP INT WECK SM WIDE RED TI TRNSVRS GRV CHEVRON SHP W/ PERCIS TIP 6 PER PK  [employer]  Right 2 Implanted         Drains: * No LDAs found *    Findings:  Infection Present At Time Of Surgery (PATOS) (choose all levels that have infection present):  No infection present  Other Findings: as above    Detailed Description of Procedure:   Indications:   Valery Ramirez is a 73 y.o. female with ESRD                   Nephrologist:  Nydia  Hand Dominance:  Right  Antibiotics: Ancef    Procedure:   right Brachiocephalic fistula    After witnessed informed consent was obtained the patient was brought to the operating room and placed in the supine position.  LMA anesthesia was administered and found to be adequate.  The Right arm was prepped and draped in a sterile fashion. After injection of local anesthesia, the incision was made and carried through the superficial fascia. The cephalic vein was exposed and tributaries were ligated with 4-0 silk ties and divided. The cephalic vein was circumferentially mobilized.  It was marked anteriorly with a surgical marker to ensure no

## 2024-05-23 NOTE — PROGRESS NOTES
Patient arrives to phase 2 from PACU, she is alert, oriented and tolerating crackers/fluids. No complaints at this time. Skin glue intact to surgical site on Right forearm. Rails up, call light in reach. Family is onsite with small children, currently in waiting area pending discharge to home.

## 2024-05-23 NOTE — H&P
Consultation/History & Physical    Date of Admission:  5/23/2024  Date of Consultation:  5/23/2024    PCP:  Tori Mora MD (Inactive)     Chief Complaint: ESRD    History of Present Illness:  Valery Ramirez is a 73 y.o. female who presents with ESRD in need of new access creation.  Presents today for new access in the right arm.   PMH:   has a past medical history of CHF (congestive heart failure) (HCC), COPD (chronic obstructive pulmonary disease) (HCC), Dysphagia, GI bleed, Gout, History of hemodialysis, Hyperlipidemia, Hypertension, Polyp of colon, Renal failure, and Voice disorder.      PSH:   has a past surgical history that includes Dialysis fistula creation; Total knee arthroplasty (Bilateral); Carpal tunnel release (Bilateral); Cholecystectomy; UPPP; Coronary angioplasty with stent; Enteroscopy (N/A, 08/29/2018); Upper gastrointestinal endoscopy (N/A, 05/05/2023); and Upper gastrointestinal endoscopy (N/A, 05/08/2024).    Allergies:    Allergies   Allergen Reactions    Penicillins     Codeine Nausea And Vomiting    Lidocaine Other (See Comments) and Rash     Patient states medication makes her hot    Methoxy Polyethylene Glycol-Epoetin Beta      Other reaction(s): Flushing (Red Skin)        Home Meds:    Prior to Admission medications    Medication Sig Start Date End Date Taking? Authorizing Provider   amLODIPine (NORVASC) 10 MG tablet Take 1 tablet by mouth daily 5/10/24   Blue Khalil MD   isosorbide mononitrate (IMDUR) 30 MG extended release tablet Take 1 tablet by mouth at bedtime    Antonino Read MD   spironolactone (ALDACTONE) 50 MG tablet Take 1 tablet by mouth daily    Antonino Read MD   cilostazol (PLETAL) 100 MG tablet Take 1 tablet by mouth 2 times daily    Antonino Read MD   calcitRIOL (ROCALTROL) 0.5 MCG capsule Take 1 capsule by mouth daily 4 Caps in the morning    Antonino Read MD   clopidogrel (PLAVIX) 75 MG tablet clopidogrel 75 mg tablet   1

## 2024-05-23 NOTE — PROGRESS NOTES
Patient tolerating snacks/fluids. She remains alert, oriented. No change to surgical site assessment, patient has no complaints. Ice pack in place. Reviewed discharge instructions with patient as well as her adult granddaughter who is in the lobby with her young children. Granddaughter to pickup prescription at outpt pharmacy. Both verbalize understanding and deny any questions or concerns. Discharged to home with family.

## 2024-05-23 NOTE — ANESTHESIA PRE PROCEDURE
Department of Anesthesiology  Preprocedure Note       Name:  Valery Ramirez   Age:  73 y.o.  :  1950                                          MRN:  [id number]         Date:  2024      Surgeon: Surgeon(s):  Cornelius Woods II, MD    Procedure: Procedure(s):  LEFT ARM ARTERIOVENOUS GRAFT  POSSIBLE RIGHT ARM ARTERIOVENOUS FISTULA CREATION POSSIBLE RIGHT ARM ARTERIOVENOUS GRAFT    Medications prior to admission:   Prior to Admission medications    Medication Sig Start Date End Date Taking? Authorizing Provider   amLODIPine (NORVASC) 10 MG tablet Take 1 tablet by mouth daily 5/10/24   Blue Khalil MD   isosorbide mononitrate (IMDUR) 30 MG extended release tablet Take 1 tablet by mouth at bedtime    Antonino Read MD   spironolactone (ALDACTONE) 50 MG tablet Take 1 tablet by mouth daily    Antonino Read MD   cilostazol (PLETAL) 100 MG tablet Take 1 tablet by mouth 2 times daily    Antonino Read MD   calcitRIOL (ROCALTROL) 0.5 MCG capsule Take 1 capsule by mouth daily 4 Caps in the morning    Antonino Read MD   clopidogrel (PLAVIX) 75 MG tablet clopidogrel 75 mg tablet   1 daily    ProviderAntonino MD   gabapentin (NEURONTIN) 100 MG capsule gabapentin 100 mg capsule   1 3 x aday    Antonino Read MD   glipiZIDE (GLUCOTROL XL) 2.5 MG extended release tablet glipizide ER 2.5 mg tablet, extended release 24 hr   1 daily    ProviderAntonino MD   rOPINIRole (REQUIP) 1 MG tablet Take 1 tablet by mouth nightly 19   Antonino Read MD   torsemide (DEMADEX) 100 MG tablet Take 0.5 tablets by mouth daily 23   Florin Thomas DO   pantoprazole (PROTONIX) 40 MG tablet Take 1 tablet by mouth 2 times daily (before meals) 23  Florin Thomas DO   atorvastatin (LIPITOR) 80 MG tablet Take 1 tablet by mouth daily    Antonino Read MD   isosorbide mononitrate (IMDUR) 30 MG extended release tablet Take 2 tablets by mouth

## 2024-06-03 ENCOUNTER — TELEPHONE (OUTPATIENT)
Dept: VASCULAR SURGERY | Age: 74
End: 2024-06-03

## 2024-06-03 NOTE — TELEPHONE ENCOUNTER
Ms. Ramirez had dialysis access placed on 5/23, when she arrivaed at dialysis her arm was grossly swollen all the way up her arm and into the breast area and down arm to finger tips. She did go to ED over the weekend and they gave her abx and pain med. Dialysis center was wanting to get her in prior to her 6/11 appt.

## 2024-06-03 NOTE — PROGRESS NOTES
[facility] Vascular and Endovascular Surgery     Referring Provider:  Tori Mora MD (Inactive)     Nephrologist:  Nydia    Office Follow Up after Dialysis Access    Subjective: Patient is status post right brachiocephalic fistula creation on May 23 and presents today for her first postoperative visit.  Patient has noted marked swelling of her right arm and right breast.  She states that she had some swelling of her right arm and breast prior to the procedure but has gotten significantly worse since the procedure.      Objective:  There were no vitals taken for this visit.  Incision: Clean and dry  Thrill and bruit present  2+ edema right arm as well as edema of the right breast and right chest wall    Assessment:  ESRD s/p right brachiocephalic fistula      Plan:  Clinically patent fistula however concerns for central venous stenosis with right arm swelling and right breast swelling.  Will have her go to [facility] for central venous evaluation and hopeful intervention to improve this.  Will Ace wrap her arm.

## 2024-06-04 ENCOUNTER — OFFICE VISIT (OUTPATIENT)
Dept: VASCULAR SURGERY | Age: 74
End: 2024-06-04

## 2024-06-04 ENCOUNTER — TELEPHONE (OUTPATIENT)
Dept: VASCULAR SURGERY | Age: 74
End: 2024-06-04

## 2024-06-04 VITALS
HEIGHT: 64 IN | SYSTOLIC BLOOD PRESSURE: 122 MMHG | BODY MASS INDEX: 25.78 KG/M2 | WEIGHT: 151 LBS | DIASTOLIC BLOOD PRESSURE: 76 MMHG

## 2024-06-04 DIAGNOSIS — N18.6 END STAGE RENAL DISEASE (HCC): Primary | ICD-10-CM

## 2024-06-04 PROCEDURE — 99024 POSTOP FOLLOW-UP VISIT: CPT | Performed by: SURGERY

## 2024-10-14 ENCOUNTER — APPOINTMENT (OUTPATIENT)
Dept: GENERAL RADIOLOGY | Age: 74
DRG: 299 | End: 2024-10-14
Payer: MEDICARE

## 2024-10-14 ENCOUNTER — APPOINTMENT (OUTPATIENT)
Dept: CT IMAGING | Age: 74
DRG: 299 | End: 2024-10-14
Payer: MEDICARE

## 2024-10-14 ENCOUNTER — HOSPITAL ENCOUNTER (INPATIENT)
Age: 74
LOS: 9 days | Discharge: HOME HEALTH | DRG: 299 | End: 2024-10-23
Attending: EMERGENCY MEDICINE | Admitting: INTERNAL MEDICINE
Payer: MEDICARE

## 2024-10-14 DIAGNOSIS — R10.84 GENERALIZED ABDOMINAL PAIN: ICD-10-CM

## 2024-10-14 DIAGNOSIS — E86.1 HYPOTENSION DUE TO HYPOVOLEMIA: ICD-10-CM

## 2024-10-14 DIAGNOSIS — I73.9 PAD (PERIPHERAL ARTERY DISEASE) (HCC): ICD-10-CM

## 2024-10-14 DIAGNOSIS — R11.2 NAUSEA AND VOMITING, UNSPECIFIED VOMITING TYPE: Primary | ICD-10-CM

## 2024-10-14 DIAGNOSIS — I70.209 ATHEROSCLEROSIS OF ARTERIES OF EXTREMITIES (HCC): ICD-10-CM

## 2024-10-14 DIAGNOSIS — N18.5 ANEMIA OF CHRONIC RENAL FAILURE, STAGE 5 (HCC): ICD-10-CM

## 2024-10-14 DIAGNOSIS — D63.1 ANEMIA OF CHRONIC RENAL FAILURE, STAGE 5 (HCC): ICD-10-CM

## 2024-10-14 PROBLEM — R10.9 ABDOMINAL PAIN: Status: ACTIVE | Noted: 2024-10-14

## 2024-10-14 LAB
ALBUMIN SERPL-MCNC: 3.7 G/DL (ref 3.4–5)
ALBUMIN/GLOB SERPL: 1.4 {RATIO} (ref 1.1–2.2)
ALP SERPL-CCNC: 108 U/L (ref 40–129)
ALT SERPL-CCNC: 11 U/L (ref 10–40)
ANION GAP SERPL CALCULATED.3IONS-SCNC: 18 MMOL/L (ref 3–16)
AST SERPL-CCNC: 28 U/L (ref 15–37)
BASE EXCESS BLDV CALC-SCNC: 1.8 MMOL/L (ref -2–3)
BASOPHILS # BLD: 0 K/UL (ref 0–0.2)
BASOPHILS NFR BLD: 0.8 %
BILIRUB SERPL-MCNC: 0.7 MG/DL (ref 0–1)
BUN SERPL-MCNC: 39 MG/DL (ref 7–20)
CALCIUM SERPL-MCNC: 8.9 MG/DL (ref 8.3–10.6)
CHLORIDE SERPL-SCNC: 98 MMOL/L (ref 99–110)
CO2 BLDV-SCNC: 29 MMOL/L
CO2 SERPL-SCNC: 21 MMOL/L (ref 21–32)
COHGB MFR BLDV: 3.1 % (ref 0–1.5)
CREAT SERPL-MCNC: 6.5 MG/DL (ref 0.6–1.2)
CRP SERPL-MCNC: <3 MG/L (ref 0–5.1)
DEPRECATED RDW RBC AUTO: 16.4 % (ref 12.4–15.4)
DO-HGB MFR BLDV: 67.6 %
EKG ATRIAL RATE: 64 BPM
EKG DIAGNOSIS: NORMAL
EKG P AXIS: 56 DEGREES
EKG P-R INTERVAL: 170 MS
EKG Q-T INTERVAL: 480 MS
EKG QRS DURATION: 154 MS
EKG QTC CALCULATION (BAZETT): 495 MS
EKG R AXIS: -60 DEGREES
EKG T AXIS: 104 DEGREES
EKG VENTRICULAR RATE: 64 BPM
EOSINOPHIL # BLD: 0.1 K/UL (ref 0–0.6)
EOSINOPHIL NFR BLD: 3.5 %
ERYTHROCYTE [SEDIMENTATION RATE] IN BLOOD BY WESTERGREN METHOD: 4 MM/HR (ref 0–30)
FLUAV RNA RESP QL NAA+PROBE: NOT DETECTED
FLUBV RNA RESP QL NAA+PROBE: NOT DETECTED
GFR SERPLBLD CREATININE-BSD FMLA CKD-EPI: 6 ML/MIN/{1.73_M2}
GLUCOSE BLD-MCNC: 48 MG/DL (ref 70–99)
GLUCOSE BLD-MCNC: 73 MG/DL (ref 70–99)
GLUCOSE BLD-MCNC: 77 MG/DL (ref 70–99)
GLUCOSE SERPL-MCNC: 69 MG/DL (ref 70–99)
HCO3 BLDV-SCNC: 27.4 MMOL/L (ref 24–28)
HCT VFR BLD AUTO: 33.8 % (ref 36–48)
HGB BLD-MCNC: 10.8 G/DL (ref 12–16)
LACTATE BLDV-SCNC: 1.2 MMOL/L (ref 0.4–2)
LIPASE SERPL-CCNC: 54 U/L (ref 13–60)
LYMPHOCYTES # BLD: 0.3 K/UL (ref 1–5.1)
LYMPHOCYTES NFR BLD: 11.6 %
MCH RBC QN AUTO: 31.5 PG (ref 26–34)
MCHC RBC AUTO-ENTMCNC: 32 G/DL (ref 31–36)
MCV RBC AUTO: 98.5 FL (ref 80–100)
METHGB MFR BLDV: 0.5 % (ref 0–1.5)
MONOCYTES # BLD: 0.3 K/UL (ref 0–1.3)
MONOCYTES NFR BLD: 12.3 %
NEUTROPHILS # BLD: 2 K/UL (ref 1.7–7.7)
NEUTROPHILS NFR BLD: 71.8 %
NT-PROBNP SERPL-MCNC: ABNORMAL PG/ML (ref 0–449)
PCO2 BLDV: 46.1 MMHG (ref 41–51)
PERFORMED ON: ABNORMAL
PERFORMED ON: NORMAL
PERFORMED ON: NORMAL
PH BLDV: 7.38 [PH] (ref 7.35–7.45)
PLATELET # BLD AUTO: 126 K/UL (ref 135–450)
PMV BLD AUTO: 9.9 FL (ref 5–10.5)
PO2 BLDV: <30 MMHG (ref 25–40)
POTASSIUM SERPL-SCNC: 3.6 MMOL/L (ref 3.5–5.1)
PROT SERPL-MCNC: 6.3 G/DL (ref 6.4–8.2)
RBC # BLD AUTO: 3.44 M/UL (ref 4–5.2)
SAO2 % BLDV: 30 %
SARS-COV-2 RNA RESP QL NAA+PROBE: NOT DETECTED
SODIUM SERPL-SCNC: 137 MMOL/L (ref 136–145)
TROPONIN, HIGH SENSITIVITY: 51 NG/L (ref 0–14)
TROPONIN, HIGH SENSITIVITY: 52 NG/L (ref 0–14)
WBC # BLD AUTO: 2.7 K/UL (ref 4–11)

## 2024-10-14 PROCEDURE — 73630 X-RAY EXAM OF FOOT: CPT

## 2024-10-14 PROCEDURE — 6360000002 HC RX W HCPCS: Performed by: INTERNAL MEDICINE

## 2024-10-14 PROCEDURE — 83880 ASSAY OF NATRIURETIC PEPTIDE: CPT

## 2024-10-14 PROCEDURE — 75635 CT ANGIO ABDOMINAL ARTERIES: CPT

## 2024-10-14 PROCEDURE — 83605 ASSAY OF LACTIC ACID: CPT

## 2024-10-14 PROCEDURE — 1200000000 HC SEMI PRIVATE

## 2024-10-14 PROCEDURE — 82803 BLOOD GASES ANY COMBINATION: CPT

## 2024-10-14 PROCEDURE — 6370000000 HC RX 637 (ALT 250 FOR IP): Performed by: INTERNAL MEDICINE

## 2024-10-14 PROCEDURE — 85652 RBC SED RATE AUTOMATED: CPT

## 2024-10-14 PROCEDURE — 6360000002 HC RX W HCPCS: Performed by: PHYSICIAN ASSISTANT

## 2024-10-14 PROCEDURE — 93005 ELECTROCARDIOGRAM TRACING: CPT | Performed by: PHYSICIAN ASSISTANT

## 2024-10-14 PROCEDURE — 96374 THER/PROPH/DIAG INJ IV PUSH: CPT

## 2024-10-14 PROCEDURE — 6360000004 HC RX CONTRAST MEDICATION: Performed by: EMERGENCY MEDICINE

## 2024-10-14 PROCEDURE — 96376 TX/PRO/DX INJ SAME DRUG ADON: CPT

## 2024-10-14 PROCEDURE — 99285 EMERGENCY DEPT VISIT HI MDM: CPT

## 2024-10-14 PROCEDURE — 80053 COMPREHEN METABOLIC PANEL: CPT

## 2024-10-14 PROCEDURE — 86140 C-REACTIVE PROTEIN: CPT

## 2024-10-14 PROCEDURE — 83690 ASSAY OF LIPASE: CPT

## 2024-10-14 PROCEDURE — 71046 X-RAY EXAM CHEST 2 VIEWS: CPT

## 2024-10-14 PROCEDURE — 84484 ASSAY OF TROPONIN QUANT: CPT

## 2024-10-14 PROCEDURE — 99223 1ST HOSP IP/OBS HIGH 75: CPT | Performed by: SURGERY

## 2024-10-14 PROCEDURE — 85025 COMPLETE CBC W/AUTO DIFF WBC: CPT

## 2024-10-14 PROCEDURE — 2580000003 HC RX 258: Performed by: INTERNAL MEDICINE

## 2024-10-14 PROCEDURE — 96375 TX/PRO/DX INJ NEW DRUG ADDON: CPT

## 2024-10-14 PROCEDURE — 87636 SARSCOV2 & INF A&B AMP PRB: CPT

## 2024-10-14 RX ORDER — ISOSORBIDE MONONITRATE 30 MG/1
30 TABLET, EXTENDED RELEASE ORAL NIGHTLY
Status: DISCONTINUED | OUTPATIENT
Start: 2024-10-14 | End: 2024-10-15

## 2024-10-14 RX ORDER — CLOPIDOGREL BISULFATE 75 MG/1
75 TABLET ORAL DAILY
Status: DISCONTINUED | OUTPATIENT
Start: 2024-10-15 | End: 2024-10-21

## 2024-10-14 RX ORDER — ONDANSETRON 2 MG/ML
4 INJECTION INTRAMUSCULAR; INTRAVENOUS EVERY 6 HOURS PRN
Status: DISCONTINUED | OUTPATIENT
Start: 2024-10-14 | End: 2024-10-23 | Stop reason: HOSPADM

## 2024-10-14 RX ORDER — HYDROMORPHONE HYDROCHLORIDE 1 MG/ML
0.5 INJECTION, SOLUTION INTRAMUSCULAR; INTRAVENOUS; SUBCUTANEOUS EVERY 4 HOURS PRN
Status: DISCONTINUED | OUTPATIENT
Start: 2024-10-14 | End: 2024-10-15

## 2024-10-14 RX ORDER — ONDANSETRON 2 MG/ML
4 INJECTION INTRAMUSCULAR; INTRAVENOUS ONCE
Status: COMPLETED | OUTPATIENT
Start: 2024-10-14 | End: 2024-10-14

## 2024-10-14 RX ORDER — SODIUM CHLORIDE 0.9 % (FLUSH) 0.9 %
5-40 SYRINGE (ML) INJECTION PRN
Status: DISCONTINUED | OUTPATIENT
Start: 2024-10-14 | End: 2024-10-23 | Stop reason: HOSPADM

## 2024-10-14 RX ORDER — ONDANSETRON 4 MG/1
4 TABLET, ORALLY DISINTEGRATING ORAL EVERY 8 HOURS PRN
Status: DISCONTINUED | OUTPATIENT
Start: 2024-10-14 | End: 2024-10-23 | Stop reason: HOSPADM

## 2024-10-14 RX ORDER — HYDROMORPHONE HYDROCHLORIDE 1 MG/ML
0.5 INJECTION, SOLUTION INTRAMUSCULAR; INTRAVENOUS; SUBCUTANEOUS ONCE
Status: COMPLETED | OUTPATIENT
Start: 2024-10-14 | End: 2024-10-14

## 2024-10-14 RX ORDER — GLUCAGON 1 MG/ML
1 KIT INJECTION PRN
Status: DISCONTINUED | OUTPATIENT
Start: 2024-10-14 | End: 2024-10-23 | Stop reason: HOSPADM

## 2024-10-14 RX ORDER — ISOSORBIDE MONONITRATE 60 MG/1
60 TABLET, EXTENDED RELEASE ORAL DAILY
Status: DISCONTINUED | OUTPATIENT
Start: 2024-10-15 | End: 2024-10-15

## 2024-10-14 RX ORDER — ASPIRIN 81 MG/1
81 TABLET ORAL DAILY
COMMUNITY

## 2024-10-14 RX ORDER — MORPHINE SULFATE 4 MG/ML
4 INJECTION INTRAVENOUS
Status: COMPLETED | OUTPATIENT
Start: 2024-10-14 | End: 2024-10-14

## 2024-10-14 RX ORDER — ROPINIROLE 1 MG/1
1 TABLET, FILM COATED ORAL NIGHTLY
Status: DISCONTINUED | OUTPATIENT
Start: 2024-10-14 | End: 2024-10-15

## 2024-10-14 RX ORDER — INSULIN LISPRO 100 [IU]/ML
0-4 INJECTION, SOLUTION INTRAVENOUS; SUBCUTANEOUS
Status: DISCONTINUED | OUTPATIENT
Start: 2024-10-14 | End: 2024-10-23 | Stop reason: HOSPADM

## 2024-10-14 RX ORDER — IOPAMIDOL 755 MG/ML
75 INJECTION, SOLUTION INTRAVASCULAR
Status: COMPLETED | OUTPATIENT
Start: 2024-10-14 | End: 2024-10-14

## 2024-10-14 RX ORDER — METOPROLOL SUCCINATE 50 MG/1
50 TABLET, EXTENDED RELEASE ORAL 2 TIMES DAILY
Status: DISCONTINUED | OUTPATIENT
Start: 2024-10-14 | End: 2024-10-23 | Stop reason: HOSPADM

## 2024-10-14 RX ORDER — SODIUM CHLORIDE 9 MG/ML
INJECTION, SOLUTION INTRAVENOUS PRN
Status: DISCONTINUED | OUTPATIENT
Start: 2024-10-14 | End: 2024-10-23 | Stop reason: HOSPADM

## 2024-10-14 RX ORDER — TRAZODONE HYDROCHLORIDE 50 MG/1
50 TABLET, FILM COATED ORAL NIGHTLY
Status: DISCONTINUED | OUTPATIENT
Start: 2024-10-14 | End: 2024-10-15

## 2024-10-14 RX ORDER — CALCITRIOL 0.25 UG/1
0.5 CAPSULE, LIQUID FILLED ORAL DAILY
Status: DISCONTINUED | OUTPATIENT
Start: 2024-10-15 | End: 2024-10-23 | Stop reason: HOSPADM

## 2024-10-14 RX ORDER — SPIRONOLACTONE 50 MG/1
50 TABLET, FILM COATED ORAL DAILY
Status: DISCONTINUED | OUTPATIENT
Start: 2024-10-14 | End: 2024-10-15

## 2024-10-14 RX ORDER — CILOSTAZOL 50 MG/1
100 TABLET ORAL 2 TIMES DAILY
Status: DISCONTINUED | OUTPATIENT
Start: 2024-10-14 | End: 2024-10-18

## 2024-10-14 RX ORDER — DEXTROSE MONOHYDRATE 100 MG/ML
INJECTION, SOLUTION INTRAVENOUS CONTINUOUS PRN
Status: DISCONTINUED | OUTPATIENT
Start: 2024-10-14 | End: 2024-10-23 | Stop reason: HOSPADM

## 2024-10-14 RX ORDER — ASPIRIN 81 MG/1
81 TABLET ORAL DAILY
Status: DISCONTINUED | OUTPATIENT
Start: 2024-10-15 | End: 2024-10-23 | Stop reason: HOSPADM

## 2024-10-14 RX ORDER — SODIUM CHLORIDE 0.9 % (FLUSH) 0.9 %
5-40 SYRINGE (ML) INJECTION EVERY 12 HOURS SCHEDULED
Status: DISCONTINUED | OUTPATIENT
Start: 2024-10-14 | End: 2024-10-23 | Stop reason: HOSPADM

## 2024-10-14 RX ORDER — ATORVASTATIN CALCIUM 80 MG/1
80 TABLET, FILM COATED ORAL NIGHTLY
Status: DISCONTINUED | OUTPATIENT
Start: 2024-10-14 | End: 2024-10-23 | Stop reason: HOSPADM

## 2024-10-14 RX ORDER — AMLODIPINE BESYLATE 10 MG/1
10 TABLET ORAL DAILY
Status: DISCONTINUED | OUTPATIENT
Start: 2024-10-15 | End: 2024-10-22

## 2024-10-14 RX ADMIN — ONDANSETRON 4 MG: 2 INJECTION INTRAMUSCULAR; INTRAVENOUS at 16:13

## 2024-10-14 RX ADMIN — MORPHINE SULFATE 4 MG: 4 INJECTION INTRAVENOUS at 12:42

## 2024-10-14 RX ADMIN — ATORVASTATIN CALCIUM 80 MG: 80 TABLET, FILM COATED ORAL at 21:14

## 2024-10-14 RX ADMIN — CILOSTAZOL 100 MG: 50 TABLET ORAL at 21:14

## 2024-10-14 RX ADMIN — RIVAROXABAN 2.5 MG: 2.5 TABLET, FILM COATED ORAL at 21:18

## 2024-10-14 RX ADMIN — HYDROMORPHONE HYDROCHLORIDE 0.5 MG: 1 INJECTION, SOLUTION INTRAMUSCULAR; INTRAVENOUS; SUBCUTANEOUS at 19:21

## 2024-10-14 RX ADMIN — HYDROMORPHONE HYDROCHLORIDE 0.5 MG: 1 INJECTION, SOLUTION INTRAMUSCULAR; INTRAVENOUS; SUBCUTANEOUS at 13:47

## 2024-10-14 RX ADMIN — TRAZODONE HYDROCHLORIDE 50 MG: 50 TABLET ORAL at 21:14

## 2024-10-14 RX ADMIN — HYDROMORPHONE HYDROCHLORIDE 0.5 MG: 1 INJECTION, SOLUTION INTRAMUSCULAR; INTRAVENOUS; SUBCUTANEOUS at 23:07

## 2024-10-14 RX ADMIN — ONDANSETRON 4 MG: 2 INJECTION INTRAMUSCULAR; INTRAVENOUS at 12:41

## 2024-10-14 RX ADMIN — IOPAMIDOL 75 ML: 755 INJECTION, SOLUTION INTRAVENOUS at 14:21

## 2024-10-14 RX ADMIN — ROPINIROLE HYDROCHLORIDE 1 MG: 1 TABLET, FILM COATED ORAL at 21:14

## 2024-10-14 RX ADMIN — ISOSORBIDE MONONITRATE 30 MG: 30 TABLET, EXTENDED RELEASE ORAL at 21:14

## 2024-10-14 RX ADMIN — SODIUM CHLORIDE, PRESERVATIVE FREE 10 ML: 5 INJECTION INTRAVENOUS at 21:16

## 2024-10-14 RX ADMIN — METOPROLOL SUCCINATE 50 MG: 50 TABLET, EXTENDED RELEASE ORAL at 21:14

## 2024-10-14 ASSESSMENT — PAIN DESCRIPTION - LOCATION
LOCATION: FOOT
LOCATION: FOOT
LOCATION: LEG;FOOT
LOCATION: ABDOMEN
LOCATION: FOOT

## 2024-10-14 ASSESSMENT — PAIN DESCRIPTION - DESCRIPTORS
DESCRIPTORS: THROBBING;TINGLING;NUMBNESS
DESCRIPTORS: SHARP;SHOOTING
DESCRIPTORS: STABBING;SHOOTING
DESCRIPTORS: SHARP;SHOOTING
DESCRIPTORS: SHOOTING;SHARP

## 2024-10-14 ASSESSMENT — PAIN SCALES - GENERAL
PAINLEVEL_OUTOF10: 10
PAINLEVEL_OUTOF10: 10
PAINLEVEL_OUTOF10: 8
PAINLEVEL_OUTOF10: 10
PAINLEVEL_OUTOF10: 6

## 2024-10-14 ASSESSMENT — PAIN DESCRIPTION - FREQUENCY: FREQUENCY: CONTINUOUS

## 2024-10-14 ASSESSMENT — PAIN DESCRIPTION - ONSET: ONSET: ON-GOING

## 2024-10-14 ASSESSMENT — PAIN DESCRIPTION - ORIENTATION
ORIENTATION: RIGHT
ORIENTATION: RIGHT
ORIENTATION: LEFT;RIGHT
ORIENTATION: RIGHT

## 2024-10-14 ASSESSMENT — PAIN DESCRIPTION - PAIN TYPE
TYPE: ACUTE PAIN
TYPE: ACUTE PAIN

## 2024-10-14 ASSESSMENT — LIFESTYLE VARIABLES
HOW MANY STANDARD DRINKS CONTAINING ALCOHOL DO YOU HAVE ON A TYPICAL DAY: PATIENT DOES NOT DRINK
HOW OFTEN DO YOU HAVE A DRINK CONTAINING ALCOHOL: NEVER

## 2024-10-14 ASSESSMENT — PAIN - FUNCTIONAL ASSESSMENT
PAIN_FUNCTIONAL_ASSESSMENT: 0-10
PAIN_FUNCTIONAL_ASSESSMENT: PREVENTS OR INTERFERES SOME ACTIVE ACTIVITIES AND ADLS

## 2024-10-14 NOTE — ED NOTES
How does patient ambulate?   []Low Fall Risk (ambulates by themselves without support)  []Stand by assist   []Contact Guard   [x]Front wheel walker- was brought in with walker, daughter took walker home  []Wheelchair   []Steady  []Bed bound  []History of Lower Extremity Amputation  [x]Unknown, did not assess in the emergency department   How does patient take pills?  []Whole with Water  []Crushed in applesauce  []Crushed in pudding  []Other  [x]Unknown no oral medications were given in the ED  Is patient alert?   [x]Alert  []Drowsy but responds to voice  []Doesn't respond to voice but responds to painful stimuli  []Unresponsive  Is patient oriented?   [x]To person  [x]To place  [x]To time  [x]To situation  []Confused  []Agitated  [x]Follows commands  If patient is disoriented or from a Skill Nursing Facility has family been notified of admission?   []Yes   [x]No  Patient belongings?   []Cell phone  []Wallet   []Dentures  [x]Clothing  Any specific patient or family belongings/needs/dynamics?   no  Miscellaneous comments/pending orders?  urine    If there are any additional questions please reach out to the Emergency Department.            Giles, Edie, RN  10/14/24 1362

## 2024-10-14 NOTE — H&P
V2.0  History and Physical      Name:  Valery Ramirez /Age/Sex: 1950  (74 y.o. female)   MRN & CSN:  [id number] & [id number] Encounter Date/Time: 10/14/2024 7:03 PM EDT   Location:  [place] [address] PCP: Tori Mora MD (Inactive)       Hospital Day: 1    Assessment and Plan:   Valery Ramirez is a 74 y.o. female with a pmh of ESRD on HD MWF, DM-2, essential hypertension, hyperlipidemia, gout, dysphagia and voice disorder, COPD, CHF, severe PAD who presents with complaints of worsening abdominal pain with associated bloody diarrhea for the past week.  Patient also complains of more redness in her right foot and toes over the past week    Hospital Problems             Last Modified POA    * (Principal) Abdominal pain 10/14/2024 Yes   #Worsening abdominal pain with associated nausea, vomiting and bloody diarrhea-likely viral in etiology.  -Patient states that she had diarrhea only once this morning and not nauseated or vomiting.  Abdominal pain improved  -Patient remains afebrile.  Lactic acid-normal.  No leukocytosis.  Chronic leukopenia.  JERRICA done in ED with no bright red blood or melanotic stool  -Abdominal pain has improved significantly with IV Dilaudid on serial abdominal exams  -Will check stool for GI pathogen's if continues to have diarrhea  -Pain relief as ordered  -Symptomatic therapy      #Worsening right foot pain and hyperemic right foot and toes  -Bilateral runoff angiogram on 2024-extensive calcifications  -CTA abdominal aorta with bilateral runoff with contrast from today-shows extensive calcific atherosclerotic disease of both lower extremities with severe stenosis of the right common femoral artery and bilateral superficial femoral artery.  Occlusion of the right renal artery and severe stenosis of the left renal artery.  Atrophy of the right kidney likely secondary to ischemia.  Cirrhosis with splenomegaly and secondary signs of portal hypertension  -X-ray right foot with  50 MG extended release tablet Take 1 tablet by mouth in the morning and at bedtime    Provider, MD Antonino   traZODone (DESYREL) 50 MG tablet Take 1 tablet by mouth nightly    Provider, MD Antonino       Physical Exam:        General: NAD, afebrile, on 2 L supplemental oxygen via nasal cannula ill-appearing.  Unable to understand speech-chronic issue (patient states that she had surgery years ago for ADALBERTO after which her speech changed)  Eyes: EOMI  ENT: neck supple  Cardiovascular: Regular rhythm and rate.  Respiratory: Clear to auscultation  Gastrointestinal: Soft, non tender  Genitourinary: no suprapubic tenderness  Musculoskeletal: No edema  Skin: warm, dry.  Difficult to palpate popliteal, posterior tibialis and dorsalis pedis bilaterally.  Mild hyperemia of the right foot toes up to mid foot, third toe with some yellowish discoloration, no active drainage.  No capillary filling on the right foot.  Capillary filling on the left-delayed.  Active AV fistula on the right forearm with good bruit.  Old nonfunctioning AV fistula on the left upper arm  Neuro: Alert.  Awake.  Oriented x 4  Psych: Mood appropriate.       Past Medical History:   PMHx   Past Medical History:   Diagnosis Date   • CHF (congestive heart failure) (HCC)    • COPD (chronic obstructive pulmonary disease) (HCC)    • Dysphagia    • GI bleed    • Gout    • History of hemodialysis     M-W-F tunnel cath left neck   • Hyperlipidemia    • Hypertension    • Polyp of colon     history   • Renal failure     on dialysis   • Voice disorder     since surgery for UPPP     PSHX:  has a past surgical history that includes Dialysis fistula creation; Total knee arthroplasty (Bilateral); Carpal tunnel release (Bilateral); Cholecystectomy; UPPP; Coronary angioplasty with stent; Enteroscopy (N/A, 08/29/2018); Upper gastrointestinal endoscopy (N/A, 05/05/2023); Upper gastrointestinal endoscopy (N/A, 05/08/2024); and Dialysis fistula creation (Right,

## 2024-10-14 NOTE — ED NOTES
Repeated POC Glucose dt critacl low result. Repeat result 73. Pt alert at this time.      Edie Giles, RN  10/14/24 3120

## 2024-10-14 NOTE — ED PROVIDER NOTES
ED Attending Attestation Note     Date of evaluation: 10/14/2024    This patient was seen by the advance practice provider.  I have seen and examined the patient, agree with the workup, evaluation, management and diagnosis. The care plan has been discussed.  I have reviewed the ECG and concur with the DELANO's interpretation.  My assessment reveals a 74-year-old female with complex past medical history including severe peripheral artery disease status post previous vascular surgery intervention with bypass graft, heart failure preserved ejection fraction, CAD, ESRD and diabetes who now presents to the emergency department with multiple complaints.  Patient extremely she has been having crampy abdominal pain with diarrhea for the last few days.  During this time she has had worsening chronic pain in her bilateral lower extremities which she states happens when she becomes dehydrated.  She states that she has failed bypass grafts and is currently in discussion with different vascular surgeons about interventions.  Patient describes her abdominal pain as crampy in nature.  Her leg pain is 10/10 severity and burning in nature.    On examination fine adult female, speaking in complete sentences.  She has no increased work of breathing or accessory muscle use during respiration.  Abdomen soft, nondistended with diffuse tenderness palpation but without rebound or guarding.    Focal examination of right lower extremity where she has the worsening pain shows erythema and desquamation of her dorsal skin of her toes.  No palpable PT or DP.  Will plan to use Doppler to identify pulses.    Critical Care:  Due to the immediate potential for life-threatening deterioration due to critical limb ischemia, I spent 45 minutes providing critical care.  This time excludes time spent performing procedures but includes time spent on direct patient care, history retrieval, review of the chart, and discussions with patient, family, and 
glipizide ER 2.5 mg tablet, extended release 24 hr   1 daily    ISOSORBIDE MONONITRATE (IMDUR) 30 MG EXTENDED RELEASE TABLET    Take 2 tablets by mouth daily    ISOSORBIDE MONONITRATE (IMDUR) 30 MG EXTENDED RELEASE TABLET    Take 1 tablet by mouth at bedtime    METOPROLOL SUCCINATE (TOPROL XL) 50 MG EXTENDED RELEASE TABLET    Take 1 tablet by mouth in the morning and at bedtime    PANTOPRAZOLE (PROTONIX) 40 MG TABLET    Take 1 tablet by mouth 2 times daily (before meals)    RIVAROXABAN (XARELTO) 2.5 MG TABS TABLET    Take 1 tablet by mouth 2 times daily    ROPINIROLE (REQUIP) 1 MG TABLET    Take 1 tablet by mouth nightly    SPIRONOLACTONE (ALDACTONE) 50 MG TABLET    Take 1 tablet by mouth daily    TORSEMIDE (DEMADEX) 100 MG TABLET    Take 0.5 tablets by mouth daily    TRAZODONE (DESYREL) 50 MG TABLET    Take 1 tablet by mouth nightly       Allergies     She is allergic to penicillins, codeine, lidocaine, and methoxy polyethylene glycol-epoetin beta.    Physical Exam     INITIAL VITALS: BP: (!) 182/73, Temp: 97.6 °F (36.4 °C), Pulse: 55, Respirations: 18, SpO2: 100 %  Physical Exam  Constitutional:       General: She is in acute distress.      Appearance: Normal appearance. She is ill-appearing. She is not toxic-appearing or diaphoretic.   HENT:      Head: Normocephalic.   Eyes:      Extraocular Movements: Extraocular movements intact.      Conjunctiva/sclera: Conjunctivae normal.   Cardiovascular:      Rate and Rhythm: Normal rate.      Heart sounds: No murmur heard.     No friction rub. No gallop.   Pulmonary:      Effort: Pulmonary effort is normal.      Breath sounds: No stridor. No wheezing or rales.   Abdominal:      General: Abdomen is flat.      Palpations: Abdomen is soft. There is no mass.      Tenderness: There is abdominal tenderness. There is guarding. There is no rebound.   Musculoskeletal:         General: Tenderness present. No swelling.      Cervical back: Neck supple.      Comments: Right foot is

## 2024-10-14 NOTE — CONSULTS
General Surgery   Resident Consult Note    Reason for Consult: R lower limb ischemia    History of Present Illness:   Valery Ramirez is a 74 y.o. female with Hx of CHF, COPD, dysphagia and voice disorder, HTN, HLD, ESRD on HD MWF, gout, T2DM, hx of GI bleed, who presents to the hospital for one week of increased abdominal pain and bloody diarrhea. The patient also noted that for the last week her right foot and toes have become more red in color. She thought it was gout but then it got worse. The patient was recently seen by vascular surgery, Dr. Drake at [facility], who completed a bilateral runoff angiogram on 9/27/2024. The impression was TASC2 D lesions bilaterally with extensive calcifications. They recommended exercise therapy. There was no critical limb threatening ischemia that would require Fem distal bypass grafting. The patent states that she currently takes aspirin, Xarelto. She states that she has had episodes of bloody diarrhea in the past. Her last colonoscopy was a few years ago.     In the ED, patient is hemodynamically stable. Labs were remarkable for BUN 39, Cr 6.5, glucose 69, NT pro-BNP 48,459, troponin 52, WBC 2.7, Hgd 10.8, PLTs 126. CTA abdomen and aorta with runoff showed Extensive calcific atherosclerotic disease of both lower extremities with severe stenosis of the right common femoral artery and bilateral superficial femoral arteries. Occlusion of the right renal artery and severe stenosis of the left renal artery. Atrophy of the right kidney is likely secondary to ischemia. Vascular surgery consulted at this time.    Past Medical History:        Diagnosis Date    CHF (congestive heart failure) (HCC)     COPD (chronic obstructive pulmonary disease) (HCC)     Dysphagia     GI bleed     Gout     History of hemodialysis     M-W-F tunnel cath left neck    Hyperlipidemia     Hypertension     Polyp of colon     history    Renal failure     on dialysis    Voice disorder     since surgery for  by mouth daily 30 tablet 3    pantoprazole (PROTONIX) 40 MG tablet Take 1 tablet by mouth 2 times daily (before meals) 120 tablet 0    atorvastatin (LIPITOR) 80 MG tablet Take 1 tablet by mouth daily      isosorbide mononitrate (IMDUR) 30 MG extended release tablet Take 2 tablets by mouth daily      rivaroxaban (XARELTO) 2.5 MG TABS tablet Take 1 tablet by mouth 2 times daily      FLUoxetine (PROZAC) 40 MG capsule Take 1 capsule by mouth daily      metoprolol succinate (TOPROL XL) 50 MG extended release tablet Take 1 tablet by mouth in the morning and at bedtime      traZODone (DESYREL) 50 MG tablet Take 1 tablet by mouth nightly         Current Meds  No current facility-administered medications for this encounter.      Family History:   History reviewed. No pertinent family history.    Social History:   TOBACCO:   reports that she has been smoking cigarettes. She has never used smokeless tobacco.  ETOH:   reports no history of alcohol use.  DRUGS:   reports no history of drug use.    ROS: A 14 point review of systems was conducted, significant findings as noted in HPI. All other systems negative.    Physical exam:    Physical exam:    Vitals:    10/14/24 1347 10/14/24 1425 10/14/24 1430 10/14/24 1458   BP:  (!) 107/58 (!) 119/50 106/70   Pulse:  71 72 74   Resp: 17 16 17 15   Temp:       TempSrc:       SpO2:  90% 97% 99%       General appearance: alert, no acute distress, ill-appearing  Eyes: PERRL, no scleral icterus  Neck: trachea midline  Chest/Lungs: normal effort, no adventitious breathing, no accessory muscle use, on 2L NC  Cardiovascular: RRR  Abdomen: soft, obese, tender to palpation throughout, non-distended, no guarding/rigidity  Skin: warm and dry, no rashes    Pulses    F P PT DP   R + -/+ -/+ -/+   L + -/+ - -/+    +, -/+ ,-/-       Extremities: no edema, no cyanosis  Neuro: A&Ox3, no focal deficits, sensation intact    Labs:    CBC:   Recent Labs     10/14/24  1234   WBC 2.7*   HGB 10.8*   HCT 33.8*

## 2024-10-15 ENCOUNTER — APPOINTMENT (OUTPATIENT)
Dept: VASCULAR LAB | Age: 74
DRG: 299 | End: 2024-10-15
Payer: MEDICARE

## 2024-10-15 LAB
ANION GAP SERPL CALCULATED.3IONS-SCNC: 14 MMOL/L (ref 3–16)
BASOPHILS # BLD: 0 K/UL (ref 0–0.2)
BASOPHILS NFR BLD: 0.8 %
BUN SERPL-MCNC: 44 MG/DL (ref 7–20)
CALCIUM SERPL-MCNC: 8.5 MG/DL (ref 8.3–10.6)
CHLORIDE SERPL-SCNC: 98 MMOL/L (ref 99–110)
CO2 SERPL-SCNC: 24 MMOL/L (ref 21–32)
CREAT SERPL-MCNC: 6.6 MG/DL (ref 0.6–1.2)
DEPRECATED RDW RBC AUTO: 16.5 % (ref 12.4–15.4)
EOSINOPHIL # BLD: 0.1 K/UL (ref 0–0.6)
EOSINOPHIL NFR BLD: 2.5 %
GFR SERPLBLD CREATININE-BSD FMLA CKD-EPI: 6 ML/MIN/{1.73_M2}
GLUCOSE BLD-MCNC: 109 MG/DL (ref 70–99)
GLUCOSE BLD-MCNC: 52 MG/DL (ref 70–99)
GLUCOSE BLD-MCNC: 79 MG/DL (ref 70–99)
GLUCOSE BLD-MCNC: 87 MG/DL (ref 70–99)
GLUCOSE SERPL-MCNC: 79 MG/DL (ref 70–99)
HCT VFR BLD AUTO: 31.6 % (ref 36–48)
HGB BLD-MCNC: 10.1 G/DL (ref 12–16)
LYMPHOCYTES # BLD: 0.3 K/UL (ref 1–5.1)
LYMPHOCYTES NFR BLD: 6.6 %
MCH RBC QN AUTO: 31.7 PG (ref 26–34)
MCHC RBC AUTO-ENTMCNC: 32.1 G/DL (ref 31–36)
MCV RBC AUTO: 98.8 FL (ref 80–100)
MONOCYTES # BLD: 0.4 K/UL (ref 0–1.3)
MONOCYTES NFR BLD: 9.3 %
NEUTROPHILS # BLD: 3.9 K/UL (ref 1.7–7.7)
NEUTROPHILS NFR BLD: 80.8 %
PERFORMED ON: ABNORMAL
PERFORMED ON: ABNORMAL
PERFORMED ON: NORMAL
PERFORMED ON: NORMAL
PLATELET # BLD AUTO: 140 K/UL (ref 135–450)
PMV BLD AUTO: 10.3 FL (ref 5–10.5)
POTASSIUM SERPL-SCNC: 3.8 MMOL/L (ref 3.5–5.1)
RBC # BLD AUTO: 3.2 M/UL (ref 4–5.2)
SODIUM SERPL-SCNC: 136 MMOL/L (ref 136–145)
WBC # BLD AUTO: 4.8 K/UL (ref 4–11)

## 2024-10-15 PROCEDURE — 99223 1ST HOSP IP/OBS HIGH 75: CPT | Performed by: INTERNAL MEDICINE

## 2024-10-15 PROCEDURE — 36415 COLL VENOUS BLD VENIPUNCTURE: CPT

## 2024-10-15 PROCEDURE — 90935 HEMODIALYSIS ONE EVALUATION: CPT | Performed by: INTERNAL MEDICINE

## 2024-10-15 PROCEDURE — 6370000000 HC RX 637 (ALT 250 FOR IP): Performed by: INTERNAL MEDICINE

## 2024-10-15 PROCEDURE — 6370000000 HC RX 637 (ALT 250 FOR IP)

## 2024-10-15 PROCEDURE — 2500000003 HC RX 250 WO HCPCS

## 2024-10-15 PROCEDURE — 90935 HEMODIALYSIS ONE EVALUATION: CPT

## 2024-10-15 PROCEDURE — 1200000000 HC SEMI PRIVATE

## 2024-10-15 PROCEDURE — 5A1D70Z PERFORMANCE OF URINARY FILTRATION, INTERMITTENT, LESS THAN 6 HOURS PER DAY: ICD-10-PCS

## 2024-10-15 PROCEDURE — 2580000003 HC RX 258: Performed by: INTERNAL MEDICINE

## 2024-10-15 PROCEDURE — 93925 LOWER EXTREMITY STUDY: CPT

## 2024-10-15 PROCEDURE — 85025 COMPLETE CBC W/AUTO DIFF WBC: CPT

## 2024-10-15 PROCEDURE — 5A1D70Z PERFORMANCE OF URINARY FILTRATION, INTERMITTENT, LESS THAN 6 HOURS PER DAY: ICD-10-PCS | Performed by: INTERNAL MEDICINE

## 2024-10-15 PROCEDURE — 80048 BASIC METABOLIC PNL TOTAL CA: CPT

## 2024-10-15 PROCEDURE — 6360000002 HC RX W HCPCS: Performed by: INTERNAL MEDICINE

## 2024-10-15 RX ORDER — ALLOPURINOL 100 MG/1
200 TABLET ORAL DAILY
COMMUNITY
Start: 2024-02-12

## 2024-10-15 RX ORDER — SEVELAMER CARBONATE 800 MG/1
1 TABLET, FILM COATED ORAL 3 TIMES DAILY
COMMUNITY
Start: 2023-10-24

## 2024-10-15 RX ORDER — SODIUM BICARBONATE 650 MG/1
650 TABLET ORAL 2 TIMES DAILY
COMMUNITY
Start: 2024-08-14

## 2024-10-15 RX ORDER — PANTOPRAZOLE SODIUM 40 MG/1
40 TABLET, DELAYED RELEASE ORAL
Status: DISCONTINUED | OUTPATIENT
Start: 2024-10-15 | End: 2024-10-23 | Stop reason: HOSPADM

## 2024-10-15 RX ORDER — PSYLLIUM HUSK 0.4 G
1 CAPSULE ORAL DAILY
COMMUNITY
Start: 2024-09-09

## 2024-10-15 RX ORDER — ALLOPURINOL 100 MG/1
200 TABLET ORAL DAILY
Status: DISCONTINUED | OUTPATIENT
Start: 2024-10-15 | End: 2024-10-23 | Stop reason: HOSPADM

## 2024-10-15 RX ORDER — CALCIUM ACETATE 667 MG/1
1 CAPSULE ORAL 3 TIMES DAILY
Status: DISCONTINUED | OUTPATIENT
Start: 2024-10-15 | End: 2024-10-23 | Stop reason: HOSPADM

## 2024-10-15 RX ORDER — CALCIUM ACETATE 667 MG/1
1 CAPSULE ORAL 3 TIMES DAILY
COMMUNITY

## 2024-10-15 RX ORDER — GABAPENTIN 100 MG/1
100 CAPSULE ORAL 3 TIMES DAILY
Status: DISCONTINUED | OUTPATIENT
Start: 2024-10-15 | End: 2024-10-17

## 2024-10-15 RX ORDER — HYDROMORPHONE HYDROCHLORIDE 2 MG/1
2 TABLET ORAL EVERY 4 HOURS PRN
Status: DISCONTINUED | OUTPATIENT
Start: 2024-10-15 | End: 2024-10-23 | Stop reason: HOSPADM

## 2024-10-15 RX ADMIN — SODIUM CHLORIDE, PRESERVATIVE FREE 10 ML: 5 INJECTION INTRAVENOUS at 08:55

## 2024-10-15 RX ADMIN — CILOSTAZOL 100 MG: 50 TABLET ORAL at 21:38

## 2024-10-15 RX ADMIN — SPIRONOLACTONE 50 MG: 50 TABLET ORAL at 08:54

## 2024-10-15 RX ADMIN — HYDROMORPHONE HYDROCHLORIDE 2 MG: 2 TABLET ORAL at 18:57

## 2024-10-15 RX ADMIN — METOPROLOL SUCCINATE 50 MG: 50 TABLET, EXTENDED RELEASE ORAL at 21:38

## 2024-10-15 RX ADMIN — SODIUM CHLORIDE, PRESERVATIVE FREE 10 ML: 5 INJECTION INTRAVENOUS at 21:42

## 2024-10-15 RX ADMIN — GABAPENTIN 100 MG: 100 CAPSULE ORAL at 14:40

## 2024-10-15 RX ADMIN — RIVAROXABAN 2.5 MG: 2.5 TABLET, FILM COATED ORAL at 08:55

## 2024-10-15 RX ADMIN — CILOSTAZOL 100 MG: 50 TABLET ORAL at 08:54

## 2024-10-15 RX ADMIN — RIVAROXABAN 2.5 MG: 2.5 TABLET, FILM COATED ORAL at 22:32

## 2024-10-15 RX ADMIN — METOPROLOL SUCCINATE 50 MG: 50 TABLET, EXTENDED RELEASE ORAL at 08:54

## 2024-10-15 RX ADMIN — CALCIUM ACETATE 667 MG: 667 CAPSULE ORAL at 18:53

## 2024-10-15 RX ADMIN — ASPIRIN 81 MG: 81 TABLET, COATED ORAL at 08:54

## 2024-10-15 RX ADMIN — HYDROMORPHONE HYDROCHLORIDE 0.5 MG: 1 INJECTION, SOLUTION INTRAMUSCULAR; INTRAVENOUS; SUBCUTANEOUS at 14:37

## 2024-10-15 RX ADMIN — TORSEMIDE 50 MG: 20 TABLET ORAL at 08:54

## 2024-10-15 RX ADMIN — CLOPIDOGREL BISULFATE 75 MG: 75 TABLET ORAL at 08:54

## 2024-10-15 RX ADMIN — AMLODIPINE BESYLATE 10 MG: 10 TABLET ORAL at 08:54

## 2024-10-15 RX ADMIN — GABAPENTIN 100 MG: 100 CAPSULE ORAL at 21:38

## 2024-10-15 RX ADMIN — ATORVASTATIN CALCIUM 80 MG: 80 TABLET, FILM COATED ORAL at 21:39

## 2024-10-15 RX ADMIN — FLUOXETINE HYDROCHLORIDE 40 MG: 20 CAPSULE ORAL at 08:54

## 2024-10-15 RX ADMIN — GABAPENTIN 100 MG: 100 CAPSULE ORAL at 08:54

## 2024-10-15 RX ADMIN — HYDROMORPHONE HYDROCHLORIDE 0.5 MG: 1 INJECTION, SOLUTION INTRAMUSCULAR; INTRAVENOUS; SUBCUTANEOUS at 03:51

## 2024-10-15 RX ADMIN — ISOSORBIDE MONONITRATE 60 MG: 60 TABLET, EXTENDED RELEASE ORAL at 08:54

## 2024-10-15 RX ADMIN — ALLOPURINOL 200 MG: 100 TABLET ORAL at 18:53

## 2024-10-15 RX ADMIN — CALCITRIOL CAPSULES 0.25 MCG 0.5 MCG: 0.25 CAPSULE ORAL at 08:54

## 2024-10-15 RX ADMIN — CALCIUM ACETATE 667 MG: 667 CAPSULE ORAL at 21:38

## 2024-10-15 RX ADMIN — HYDROMORPHONE HYDROCHLORIDE 0.5 MG: 1 INJECTION, SOLUTION INTRAMUSCULAR; INTRAVENOUS; SUBCUTANEOUS at 09:02

## 2024-10-15 RX ADMIN — PANTOPRAZOLE SODIUM 40 MG: 40 TABLET, DELAYED RELEASE ORAL at 18:53

## 2024-10-15 ASSESSMENT — PAIN DESCRIPTION - DESCRIPTORS
DESCRIPTORS: ACHING;THROBBING;NUMBNESS;TINGLING
DESCRIPTORS: ACHING;THROBBING

## 2024-10-15 ASSESSMENT — PAIN SCALES - GENERAL
PAINLEVEL_OUTOF10: 10
PAINLEVEL_OUTOF10: 8
PAINLEVEL_OUTOF10: 5
PAINLEVEL_OUTOF10: 10
PAINLEVEL_OUTOF10: 7
PAINLEVEL_OUTOF10: 5
PAINLEVEL_OUTOF10: 5
PAINLEVEL_OUTOF10: 0
PAINLEVEL_OUTOF10: 3
PAINLEVEL_OUTOF10: 8

## 2024-10-15 ASSESSMENT — PAIN DESCRIPTION - PAIN TYPE
TYPE: ACUTE PAIN
TYPE: ACUTE PAIN;CHRONIC PAIN
TYPE: ACUTE PAIN;CHRONIC PAIN

## 2024-10-15 ASSESSMENT — PAIN DESCRIPTION - FREQUENCY
FREQUENCY: CONTINUOUS

## 2024-10-15 ASSESSMENT — PAIN DESCRIPTION - ONSET
ONSET: PROGRESSIVE

## 2024-10-15 ASSESSMENT — PAIN DESCRIPTION - ORIENTATION
ORIENTATION: RIGHT

## 2024-10-15 ASSESSMENT — PAIN DESCRIPTION - LOCATION
LOCATION: FOOT;LEG
LOCATION: FOOT;LEG
LOCATION: LEG;FOOT
LOCATION: FOOT;LEG

## 2024-10-15 ASSESSMENT — PAIN - FUNCTIONAL ASSESSMENT
PAIN_FUNCTIONAL_ASSESSMENT: ACTIVITIES ARE NOT PREVENTED
PAIN_FUNCTIONAL_ASSESSMENT: PREVENTS OR INTERFERES SOME ACTIVE ACTIVITIES AND ADLS
PAIN_FUNCTIONAL_ASSESSMENT: ACTIVITIES ARE NOT PREVENTED
PAIN_FUNCTIONAL_ASSESSMENT: ACTIVITIES ARE NOT PREVENTED

## 2024-10-15 NOTE — PROGRESS NOTES
Patient able to get some rest overnight after pain medication given. Pain improved after PRN dilaudid. Patient is A&O x4. VSS on 2L NC. Pulses are doppler to BLE. Denies nausea. No diarrhea this shift. Voiding via bedside commode. Planning for arterial duplex today of BLE. All fall precautions and safety measures are in place, bed alarm on, bed locked and in lowest position. Will continue to monitor.

## 2024-10-15 NOTE — CARE COORDINATION
Case Management Assessment  Initial Evaluation    Date/Time of Evaluation: 10/15/2024 11:24 AM  Assessment Completed by: Jamar Blue RN    If patient is discharged prior to next notation, then this note serves as note for discharge by case management.    Patient Name: Valery Ramirez                   YOB: 1950  Diagnosis: Abdominal pain [R10.9]  Generalized abdominal pain [R10.84]  Atherosclerosis of arteries of extremities (HCC) [I70.209]  PAD (peripheral artery disease) (HCC) [I73.9]  Nausea and vomiting, unspecified vomiting type [R11.2]                   Date / Time: 10/14/2024 11:33 AM    Patient Admission Status: Inpatient   Readmission Risk (Low < 19, Mod (19-27), High > 27): Readmission Risk Score: 22.5    Current PCP: Tori Mora MD (Inactive)  PCP verified by CM? Yes    Chart Reviewed: Yes      History Provided by: Medical Record, Patient  Patient Orientation: Alert and Oriented    Patient Cognition:      Hospitalization in the last 30 days (Readmission):  No    If yes, Readmission Assessment in CM Navigator will be completed and shown below.          Advance Directives:      Code Status: Full Code   Patient's Primary Decision Maker is: Legal Next of Kin      Discharge Planning:    Patient lives with: Alone Type of Home: Apartment  Primary Care Giver: Self  Patient Support Systems include: Family Members, Friends/Neighbors   Current Financial resources: Medicare  Current community resources:    Current services prior to admission: None            Current DME:              Type of Home Care services:  None    ADLS  Prior functional level: Independent in ADLs/IADLs  Current functional level: Independent in ADLs/IADLs    PT AM-PAC:   /24  OT AM-PAC:   /24    Family can provide assistance at DC: Yes  Would you like Case Management to discuss the discharge plan with any other family members/significant others, and if so, who? No  Plans to Return to Present Housing: Yes  Other  Identified Issues/Barriers to RETURNING to current housing: medical complications  Potential Assistance needed at discharge: N/A            Potential DME:    Patient expects to discharge to: Apartment  Plan for transportation at discharge:      Financial    Payor: MEDICARE / Plan: MEDICARE PART A AND B / Product Type: *No Product type* /     Does insurance require precert for SNF: No    Potential assistance Purchasing Medications: No  Meds-to-Beds request:        [facility]/pharmacy #[id number] - [place], [place] - [address] [phone] - F [phone]  [address] [address]  Phone: [phone] Fax: [phone]      Notes:    Factors facilitating achievement of predicted outcomes: Family support and Good insight into deficits    Barriers to discharge: Medical complications    Additional Case Management Notes: patient is from home, active at [facility] in [place]. Patient admitted for Abdominal pain [R10.9]  Generalized abdominal pain [R10.84]  Atherosclerosis of arteries of extremities (HCC) [I70.209]  PAD (peripheral artery disease) (HCC) [I73.9]  Nausea and vomiting, unspecified vomiting type [R11.2]  Plan for vascular to see; ordered duplex. Nephrology following; HD today. CM following for dispo planning.       The Plan for Transition of Care is related to the following treatment goals of Abdominal pain [R10.9]  Generalized abdominal pain [R10.84]  Atherosclerosis of arteries of extremities (HCC) [I70.209]  PAD (peripheral artery disease) (HCC) [I73.9]  Nausea and vomiting, unspecified vomiting type [R11.2]    IF APPLICABLE: The Patient and/or patient representative Valery and her family were provided with a choice of provider and agrees with the discharge plan. Freedom of choice list with basic dialogue that supports the patient's individualized plan of care/goals and shares the quality data associated with the providers was provided to: Patient   Patient Representative Name:       The

## 2024-10-15 NOTE — PROGRESS NOTES
Hospital Medicine Progress Note      Date of Admission: 10/14/2024  Hospital Day: 2    Chief Admission Complaint: Right lower extremity pain and abdominal pain     Subjective: Patient seen and examined in dialysis today.  States that she is not having any more episodes of diarrhea or did not have any bowel movement since yesterday.  She continues to have upper abdominal pain but is tolerating oral diet.  She also states that her right lower extremity pain is not any better but denies any worsening weakness, tingling, numbness    Presenting Admission History:       This is a 74-year-old female with medical history significant for ESRD on hemodialysis MWF, type II DM, essential hypertension, hyperlipidemia, gout, dysphagia and voice disorder, COPD, HFpEF, severe peripheral artery disease presented with complaints of nausea, vomiting, diarrhea with abdominal pain as well as worsening right lower extremity pain over the past week.  JERRICA negative for blood.  She was admitted for concerns of right lower extremity ischemia and gastroenteritis.    Assessment/Plan:      Worsening right lower extremity pain concerning for right lower extremity ischemia  CT abdominal aorta with bilateral runoff with contrast 10/14 1.  Extensive calcific atherosclerotic disease of both lower extremities with severe stenosis of the right common femoral artery and bilateral superficial femoral arteries. Additional evaluation of arterial structures is limited due to phase of contrast. 2.  Occlusion of the right renal artery and severe stenosis of the left renal artery. Atrophy of the right kidney is likely secondary to ischemia. 3.  Tortuous veins of the retroperitoneum involving the proximal duodenal wall.  X-ray of the right foot negative for any acute fracture.  Vascular surgery on board, recommend bilateral lower extremity arterial Doppler which is pending.  Continue Xarelto 2.5 mg twice daily, aspirin, Plavix, cilostazol and  mononitrate  30 mg Oral Nightly    metoprolol succinate  50 mg Oral BID    rivaroxaban  2.5 mg Oral BID    rOPINIRole  1 mg Oral Nightly    traZODone  50 mg Oral Nightly    sodium chloride flush  5-40 mL IntraVENous 2 times per day    insulin lispro  0-4 Units SubCUTAneous 4x Daily AC & HS    aspirin  81 mg Oral Daily    spironolactone  50 mg Oral Daily    torsemide  50 mg Oral Daily     PRN Meds: HYDROmorphone, glucose, dextrose bolus **OR** dextrose bolus, glucagon (rDNA), dextrose, sodium chloride flush, sodium chloride, ondansetron **OR** ondansetron     Labs:  Personally reviewed and interpreted for clinical significance.     Recent Labs     10/14/24  1234 10/15/24  0639   WBC 2.7* 4.8   HGB 10.8* 10.1*   HCT 33.8* 31.6*   * 140     Recent Labs     10/14/24  1234 10/15/24  0639    136   K 3.6 3.8   CL 98* 98*   CO2 21 24   BUN 39* 44*   CREATININE 6.5* 6.6*   CALCIUM 8.9 8.5     Recent Labs     10/14/24  1234 10/14/24  1349   PROBNP 48,459*  --    TROPHS 52* 51*     No results for input(s): \"LABA1C\" in the last 72 hours.  Recent Labs     10/14/24  1234   AST 28   ALT 11   BILITOT 0.7   ALKPHOS 108     Recent Labs     10/14/24  1234   LACTA 1.2       Urine Cultures: No results found for: \"LABURIN\"  Blood Cultures: No results found for: \"BC\"  No results found for: \"BLOODCULT2\"  Organism: No results found for: \"ORG\"      Melva Patten MD

## 2024-10-15 NOTE — PLAN OF CARE
Problem: Chronic Conditions and Co-morbidities  Goal: Patient's chronic conditions and co-morbidity symptoms are monitored and maintained or improved  10/15/2024 1506 by April Garcia RN  Outcome: Progressing     Problem: Pain  Goal: Verbalizes/displays adequate comfort level or baseline comfort level  10/15/2024 1506 by April Garcia RN  Outcome: Progressing     Problem: Safety - Adult  Goal: Free from fall injury  10/15/2024 1506 by April Garcia RN  Outcome: Progressing

## 2024-10-15 NOTE — CONSULTS
Nephrology Consult Note                                                                                                                                                                                                                                                                                                                                                               Office : [phone]     Fax :[phone]    Patient's Name: Valery Ramirez  8:39 AM  10/15/2024    Reason for Consult:  ESRD on HD  Requesting Physician:  Tori Mora MD (Inactive)  Chief Complaint:    Chief Complaint   Patient presents with    Diarrhea     Pt reports bloody diarrhea since Wednesday.     Nausea    Emesis       Assessment/Plan     # ESRD on HD   - Dialyzes on a MWF schedule; missed HD yesterday  - Anticipate HD today  - Renally dose meds for ESRD  - Monitor renal labs     # HTN  - BP's acceptable  - Monitor     # Anemia of chronic disease  - Hgb at goal for ESRD  - No need for UMBERTO at this time   - Monitor     # Acid- base/ Electrolyte imbalance   - Lytes stable  - Monitor     # MBD management  - Cont home Calcitriol 0.5 mcg daily   - Low phos diet     # DM2   - Insulin management per primary     # CHF  - Cont vol management with HD     # Worsening abdominal pain   - Felt to be viral in etiology  - Symptomatic management for now     # R lower limb ischemia   - S/p bilateral runoff angiogram 9/27 with extensive calcifications  - Bilateral arterial duplex pending   - Vascular surgery consulted         History of Present Ilness:    Valery Ramirez is a 74 y.o. female with a PMH of ESRD on HD, HTN, HLD, COPD, CHF, gout, DM2, h/o GI bleed, who presents to the hospital for one week of increased abdominal pain and bloody diarrhea. Patient also noted over the last week that her right foot and toes have become more red in color. Patient recently had an angiogram on 9/27 with [facility]. The impression was TASC2 D lesions

## 2024-10-15 NOTE — PLAN OF CARE
Problem: Chronic Conditions and Co-morbidities  Goal: Patient's chronic conditions and co-morbidity symptoms are monitored and maintained or improved  Outcome: Progressing  Flowsheets (Taken 10/15/2024 0550)  Care Plan - Patient's Chronic Conditions and Co-Morbidity Symptoms are Monitored and Maintained or Improved:   Monitor and assess patient's chronic conditions and comorbid symptoms for stability, deterioration, or improvement   Collaborate with multidisciplinary team to address chronic and comorbid conditions and prevent exacerbation or deterioration   Update acute care plan with appropriate goals if chronic or comorbid symptoms are exacerbated and prevent overall improvement and discharge     Problem: Discharge Planning  Goal: Discharge to home or other facility with appropriate resources  Outcome: Progressing  Flowsheets (Taken 10/15/2024 0550)  Discharge to home or other facility with appropriate resources:   Identify barriers to discharge with patient and caregiver   Identify discharge learning needs (meds, wound care, etc)   Arrange for needed discharge resources and transportation as appropriate     Problem: Pain  Goal: Verbalizes/displays adequate comfort level or baseline comfort level  Outcome: Progressing  Flowsheets (Taken 10/15/2024 0550)  Verbalizes/displays adequate comfort level or baseline comfort level:   Encourage patient to monitor pain and request assistance   Administer analgesics based on type and severity of pain and evaluate response   Consider cultural and social influences on pain and pain management   Assess pain using appropriate pain scale   Implement non-pharmacological measures as appropriate and evaluate response   Notify Licensed Independent Practitioner if interventions unsuccessful or patient reports new pain     Problem: Skin/Tissue Integrity  Goal: Absence of new skin breakdown  Description: 1.  Monitor for areas of redness and/or skin breakdown  2.  Assess vascular access

## 2024-10-15 NOTE — PROGRESS NOTES
Nutrition    Nutrition screening referral was triggered based on results obtained during nursing admission assessment for Unint[facility] Weight [facility].    The patient's chart was reviewed and nutrition assessment is not indicated at this time.  Patient will be seen per nutrition standards of care.         Grace Coyne RD, LD  [place]:  [phone]

## 2024-10-15 NOTE — PROGRESS NOTES
Patient admitted to room 4302 from the ED for abdominal pain and RLE pain.    VSS on 2L NC, C/o 9-10/10 pain to RLE.   Patient is A&O x4. Known HD patient with functioning fistula to RUE.   Patient oriented to room, environment and call light.   All fall precautions and safety measures are in place. Bed alarm on, bed locked and in lowest position. Call light and over bed table are within reach. Patient instructed to call with any needs or concerns that arise. Will continue to monitor.

## 2024-10-15 NOTE — PROGRESS NOTES
4 Eyes Skin Assessment     NAME:  Valery Ramirez  YOB: 1950  MEDICAL RECORD NUMBER:  [id number]    The patient is being assessed for  Admission    I agree that at least one RN has performed a thorough Head to Toe Skin Assessment on the patient. ALL assessment sites listed below have been assessed.      Areas assessed by both nurses:    Head, Face, Ears, Shoulders, Back, Chest, Arms, Elbows, Hands, Sacrum. Buttock, Coccyx, Ischium, Legs. Feet and Heels, and Under Medical Devices   - blanchable redness to coccyx  - scattered bruising and to left groin   - abrasion to right back   - cracked heels bilaterally   - redness right foot/ toes         Does the Patient have a Wound? No noted wound(s)       Jeremiah Prevention initiated by RN: Yes  Wound Care Orders initiated by RN: No    Pressure Injury (Stage 3,4, Unstageable, DTI, NWPT, and Complex wounds) if present, place Wound referral order by RN under : No    New Ostomies, if present place, Ostomy referral order under : No     Nurse 1 eSignature: Electronically signed by Jamar Stack RN on 10/14/24 at 10:44 PM EDT    **SHARE this note so that the co-signing nurse can place an eSignature**    Nurse 2 eSignature: Electronically signed by Mimi Ramirez RN on 10/15/24 at 12:47 AM EDT

## 2024-10-15 NOTE — CONSULTS
Clinical Pharmacy Consult Note  Medication History     Admit Date: 10/14/2024    Pharmacy consulted to verify home medication list by Dr. Rivera.    List of current medications patient is taking is complete. Home Medication list in EPIC updated to reflect changes noted below.    Source of information: Rx dispense history (Surescripts), interview with patient; Rx fills per [facility]    Patient's home pharmacy: [facility] + [facility]     Changes made to medication list:   Medications removed: (include reason, ex: therapy completed, patient no longer taking, etc.)  Removed the following medications as pt states she is not taking (confirmed w/ Rx dispense history)--  Cilostazol  Clopidogrel   Glipizide  Isosorbide   Ropinirole   Trazodone   Gabapentin - no dispenses, verified with [facility] + [facility] report   Omeprazole - pt on pantoprazole  Medications added:   Allopurinol   Calcium acetate  Calc + vitamin D  Sevelamer   Sodium bicarbonate  Medication doses adjusted:   Amlodipine-->10mg po daily  Other notes:   Calcitriol - unclear if pt is taking; no dispense history, pt thinks she is taking  Fluoxetine - pt states she still takes, but last filled Feb 2023  Spironolactone - pt states she does not take every day; no dispense history / not filled w/in past year per [facility]. Pt states it's too hard to take 2 water pills  Torsemide -- pt states she does not take every day as it's too hard to take 2 water pills. Last filled for 100mg tabs; Qty #100 on 1/8/2024    Please call with questions--  Azalea Dillard PharmD, [facility]  Wireless: [phone]   10/15/2024 2:53 PM

## 2024-10-15 NOTE — PROGRESS NOTES
Vascular Surgery Daily Progress Note  Patient: Valery Ramirez    CC: R lower limb ischemia     Subjective :  Continues to endorse R foot pain. Denies fevers or chills.     ROS: A 14 point review of systems was conducted, significant findings as noted above. All other systems negative.    Objective :   Infusions:   dextrose      sodium chloride          I/O:I/O last 3 completed shifts:  In: 700 [P.O.:700]  Out: 100 [Urine:100]           Wt Readings from Last 1 Encounters:   10/15/24 67.5 kg (148 lb 13 oz)       Exam:BP (!) 148/69   Pulse 64   Temp 97.6 °F (36.4 °C) (Oral)   Resp 18   Ht 1.626 m (5' 4\")   Wt 67.5 kg (148 lb 13 oz)   SpO2 97%   BMI 25.54 kg/m²     General appearance: alert, no acute distress, ill-appearing  Eyes: PERRL, no scleral icterus  Neck: trachea midline  Chest/Lungs: normal effort on 2L NC  Cardiovascular: RRR  Abdomen: soft, obese, tender to palpation throughout, non-distended, no guarding/rigidity  Skin: warm and dry, no rashes     Pulses     F P PT DP   R + -/+ -/+ -/+   L + -/+ - -/+    +, -/+ ,-/-        LABS:   Recent Labs     10/14/24  1234   WBC 2.7*   HGB 10.8*   HCT 33.8*   MCV 98.5   *        Recent Labs     10/14/24  1234      K 3.6   CL 98*   CO2 21   BUN 39*   CREATININE 6.5*        Recent Labs     10/14/24  1234   AST 28   ALT 11   BILITOT 0.7   ALKPHOS 108        Recent Labs     10/14/24  1234   LIPASE 54.0      No results for input(s): \"INR\", \"APTT\" in the last 72 hours.    Invalid input(s): \"PROT\"   No results for input(s): \"CKTOTAL\", \"CKMB\", \"CKMBINDEX\", \"TROPONINI\" in the last 72 hours.    ASSESSMENT/PLAN: Pt. is a 74 y.o. female Hx of CHF, COPD, dysphagia and voice disorder, HTN, HLD, ESRD on HD MWF, gout, T2DM, hx of GI bleed, who presents to the hospital for one week of increased abdominal pain and bloody diarrhea. The patient also noted that for the last week her right foot and toes have become more red in color. Bilateral runoff angiogram on

## 2024-10-16 LAB
ANION GAP SERPL CALCULATED.3IONS-SCNC: 9 MMOL/L (ref 3–16)
ANTI-XA UNFRAC HEPARIN: 1.05 IU/ML (ref 0.3–0.7)
APTT BLD: 30.7 SEC (ref 22.1–36.4)
APTT BLD: 50.3 SEC (ref 22.1–36.4)
BASOPHILS # BLD: 0 K/UL (ref 0–0.2)
BASOPHILS NFR BLD: 0.5 %
BUN SERPL-MCNC: 21 MG/DL (ref 7–20)
CALCIUM SERPL-MCNC: 8.6 MG/DL (ref 8.3–10.6)
CHLORIDE SERPL-SCNC: 100 MMOL/L (ref 99–110)
CO2 SERPL-SCNC: 27 MMOL/L (ref 21–32)
CREAT SERPL-MCNC: 4.6 MG/DL (ref 0.6–1.2)
DEPRECATED RDW RBC AUTO: 16.8 % (ref 12.4–15.4)
EOSINOPHIL # BLD: 0.1 K/UL (ref 0–0.6)
EOSINOPHIL NFR BLD: 3.1 %
EST. AVERAGE GLUCOSE BLD GHB EST-MCNC: 88.2 MG/DL
GFR SERPLBLD CREATININE-BSD FMLA CKD-EPI: 9 ML/MIN/{1.73_M2}
GLUCOSE BLD-MCNC: 105 MG/DL (ref 70–99)
GLUCOSE BLD-MCNC: 12 MG/DL (ref 70–99)
GLUCOSE BLD-MCNC: 173 MG/DL (ref 70–99)
GLUCOSE BLD-MCNC: 24 MG/DL (ref 70–99)
GLUCOSE BLD-MCNC: 89 MG/DL (ref 70–99)
GLUCOSE BLD-MCNC: 93 MG/DL (ref 70–99)
GLUCOSE SERPL-MCNC: 133 MG/DL (ref 70–99)
GLUCOSE SERPL-MCNC: 88 MG/DL (ref 70–99)
HBA1C MFR BLD: 4.7 %
HBV CORE IGM SERPL QL IA: NORMAL
HBV SURFACE AB SERPL IA-ACNC: <3.5 MIU/ML
HCT VFR BLD AUTO: 29.9 % (ref 36–48)
HGB BLD-MCNC: 9.6 G/DL (ref 12–16)
INR PPP: 1.18 (ref 0.85–1.15)
LYMPHOCYTES # BLD: 0.4 K/UL (ref 1–5.1)
LYMPHOCYTES NFR BLD: 8.6 %
MCH RBC QN AUTO: 31.5 PG (ref 26–34)
MCHC RBC AUTO-ENTMCNC: 32.1 G/DL (ref 31–36)
MCV RBC AUTO: 98.3 FL (ref 80–100)
MONOCYTES # BLD: 0.6 K/UL (ref 0–1.3)
MONOCYTES NFR BLD: 12.1 %
NEUTROPHILS # BLD: 3.5 K/UL (ref 1.7–7.7)
NEUTROPHILS NFR BLD: 75.7 %
PERFORMED ON: ABNORMAL
PERFORMED ON: NORMAL
PERFORMED ON: NORMAL
PLATELET # BLD AUTO: 154 K/UL (ref 135–450)
PMV BLD AUTO: 10.3 FL (ref 5–10.5)
POTASSIUM SERPL-SCNC: 3.8 MMOL/L (ref 3.5–5.1)
PROTHROMBIN TIME: 15.2 SEC (ref 11.9–14.9)
RBC # BLD AUTO: 3.05 M/UL (ref 4–5.2)
SODIUM SERPL-SCNC: 136 MMOL/L (ref 136–145)
WBC # BLD AUTO: 4.6 K/UL (ref 4–11)

## 2024-10-16 PROCEDURE — 85610 PROTHROMBIN TIME: CPT

## 2024-10-16 PROCEDURE — 97116 GAIT TRAINING THERAPY: CPT

## 2024-10-16 PROCEDURE — 6370000000 HC RX 637 (ALT 250 FOR IP): Performed by: INTERNAL MEDICINE

## 2024-10-16 PROCEDURE — 6360000002 HC RX W HCPCS

## 2024-10-16 PROCEDURE — 99233 SBSQ HOSP IP/OBS HIGH 50: CPT | Performed by: INTERNAL MEDICINE

## 2024-10-16 PROCEDURE — 97535 SELF CARE MNGMENT TRAINING: CPT

## 2024-10-16 PROCEDURE — 85025 COMPLETE CBC W/AUTO DIFF WBC: CPT

## 2024-10-16 PROCEDURE — 97162 PT EVAL MOD COMPLEX 30 MIN: CPT

## 2024-10-16 PROCEDURE — 2500000003 HC RX 250 WO HCPCS

## 2024-10-16 PROCEDURE — 97530 THERAPEUTIC ACTIVITIES: CPT

## 2024-10-16 PROCEDURE — 36415 COLL VENOUS BLD VENIPUNCTURE: CPT

## 2024-10-16 PROCEDURE — 1200000000 HC SEMI PRIVATE

## 2024-10-16 PROCEDURE — 6370000000 HC RX 637 (ALT 250 FOR IP)

## 2024-10-16 PROCEDURE — P9047 ALBUMIN (HUMAN), 25%, 50ML: HCPCS

## 2024-10-16 PROCEDURE — 2580000003 HC RX 258: Performed by: INTERNAL MEDICINE

## 2024-10-16 PROCEDURE — 80048 BASIC METABOLIC PNL TOTAL CA: CPT

## 2024-10-16 PROCEDURE — 83036 HEMOGLOBIN GLYCOSYLATED A1C: CPT

## 2024-10-16 PROCEDURE — 85730 THROMBOPLASTIN TIME PARTIAL: CPT

## 2024-10-16 PROCEDURE — 86705 HEP B CORE ANTIBODY IGM: CPT

## 2024-10-16 PROCEDURE — 82947 ASSAY GLUCOSE BLOOD QUANT: CPT

## 2024-10-16 PROCEDURE — 86706 HEP B SURFACE ANTIBODY: CPT

## 2024-10-16 PROCEDURE — 97166 OT EVAL MOD COMPLEX 45 MIN: CPT

## 2024-10-16 PROCEDURE — 90935 HEMODIALYSIS ONE EVALUATION: CPT

## 2024-10-16 PROCEDURE — 85520 HEPARIN ASSAY: CPT

## 2024-10-16 RX ORDER — ALBUMIN (HUMAN) 12.5 G/50ML
SOLUTION INTRAVENOUS
Status: COMPLETED
Start: 2024-10-16 | End: 2024-10-16

## 2024-10-16 RX ORDER — ALBUMIN (HUMAN) 12.5 G/50ML
25 SOLUTION INTRAVENOUS PRN
Status: DISCONTINUED | OUTPATIENT
Start: 2024-10-16 | End: 2024-10-23 | Stop reason: HOSPADM

## 2024-10-16 RX ORDER — HEPARIN SODIUM 10000 [USP'U]/100ML
5-30 INJECTION, SOLUTION INTRAVENOUS CONTINUOUS
Status: DISCONTINUED | OUTPATIENT
Start: 2024-10-16 | End: 2024-10-21

## 2024-10-16 RX ADMIN — HEPARIN SODIUM 10 UNITS/KG/HR: 10000 INJECTION, SOLUTION INTRAVENOUS at 13:03

## 2024-10-16 RX ADMIN — CILOSTAZOL 100 MG: 50 TABLET ORAL at 08:31

## 2024-10-16 RX ADMIN — ALBUMIN (HUMAN) 25 G: 0.25 INJECTION, SOLUTION INTRAVENOUS at 14:20

## 2024-10-16 RX ADMIN — GABAPENTIN 100 MG: 100 CAPSULE ORAL at 21:18

## 2024-10-16 RX ADMIN — SODIUM CHLORIDE, PRESERVATIVE FREE 10 ML: 5 INJECTION INTRAVENOUS at 08:32

## 2024-10-16 RX ADMIN — ATORVASTATIN CALCIUM 80 MG: 80 TABLET, FILM COATED ORAL at 21:18

## 2024-10-16 RX ADMIN — GABAPENTIN 100 MG: 100 CAPSULE ORAL at 08:32

## 2024-10-16 RX ADMIN — ASPIRIN 81 MG: 81 TABLET, COATED ORAL at 08:31

## 2024-10-16 RX ADMIN — CALCIUM ACETATE 667 MG: 667 CAPSULE ORAL at 21:18

## 2024-10-16 RX ADMIN — CLOPIDOGREL BISULFATE 75 MG: 75 TABLET ORAL at 08:32

## 2024-10-16 RX ADMIN — CILOSTAZOL 100 MG: 50 TABLET ORAL at 21:18

## 2024-10-16 RX ADMIN — FLUOXETINE HYDROCHLORIDE 40 MG: 20 CAPSULE ORAL at 08:32

## 2024-10-16 RX ADMIN — HYDROMORPHONE HYDROCHLORIDE 2 MG: 2 TABLET ORAL at 08:29

## 2024-10-16 RX ADMIN — ALLOPURINOL 200 MG: 100 TABLET ORAL at 08:32

## 2024-10-16 RX ADMIN — HYDROMORPHONE HYDROCHLORIDE 2 MG: 2 TABLET ORAL at 19:59

## 2024-10-16 RX ADMIN — PANTOPRAZOLE SODIUM 40 MG: 40 TABLET, DELAYED RELEASE ORAL at 07:36

## 2024-10-16 RX ADMIN — CALCITRIOL CAPSULES 0.25 MCG 0.5 MCG: 0.25 CAPSULE ORAL at 08:31

## 2024-10-16 RX ADMIN — RIVAROXABAN 2.5 MG: 2.5 TABLET, FILM COATED ORAL at 08:32

## 2024-10-16 RX ADMIN — CALCIUM ACETATE 667 MG: 667 CAPSULE ORAL at 08:31

## 2024-10-16 ASSESSMENT — PAIN - FUNCTIONAL ASSESSMENT: PAIN_FUNCTIONAL_ASSESSMENT: ACTIVITIES ARE NOT PREVENTED

## 2024-10-16 ASSESSMENT — PAIN DESCRIPTION - FREQUENCY
FREQUENCY: INTERMITTENT
FREQUENCY: CONTINUOUS

## 2024-10-16 ASSESSMENT — PAIN DESCRIPTION - LOCATION
LOCATION: LEG;FOOT
LOCATION: LEG;FOOT

## 2024-10-16 ASSESSMENT — PAIN DESCRIPTION - DESCRIPTORS
DESCRIPTORS: ACHING;THROBBING
DESCRIPTORS: ACHING;NUMBNESS

## 2024-10-16 ASSESSMENT — PAIN DESCRIPTION - PAIN TYPE
TYPE: ACUTE PAIN;CHRONIC PAIN
TYPE: ACUTE PAIN

## 2024-10-16 ASSESSMENT — PAIN SCALES - GENERAL
PAINLEVEL_OUTOF10: 8
PAINLEVEL_OUTOF10: 10
PAINLEVEL_OUTOF10: 5
PAINLEVEL_OUTOF10: 0
PAINLEVEL_OUTOF10: 7
PAINLEVEL_OUTOF10: 0

## 2024-10-16 ASSESSMENT — PAIN DESCRIPTION - ONSET
ONSET: GRADUAL
ONSET: ON-GOING

## 2024-10-16 ASSESSMENT — PAIN DESCRIPTION - ORIENTATION
ORIENTATION: RIGHT
ORIENTATION: RIGHT

## 2024-10-16 NOTE — PLAN OF CARE
Problem: Chronic Conditions and Co-morbidities  Goal: Patient's chronic conditions and co-morbidity symptoms are monitored and maintained or improved  10/16/2024 1202 by April Garcia RN  Outcome: Progressing     Problem: Pain  Goal: Verbalizes/displays adequate comfort level or baseline comfort level  10/16/2024 1202 by April Garcia RN  Outcome: Progressing     Problem: Safety - Adult  Goal: Free from fall injury  10/16/2024 1202 by April Garcia RN  Outcome: Progressing

## 2024-10-16 NOTE — PROGRESS NOTES
Treatment time: 3 hrs    Net UF: 65ml    Pre weight: 66.5kg   Post weight: 66.4 kg      Access used: TEE AVF  Access function: tolerated well,  ml/min    Medications or blood products given: none    Regular outpatient schedule: TBD    Summary of response to treatment: tx completed, with episodes of hypotension, MD aware,  left unit stable, without complaints.    Copy of dialysis treatment record placed in chart, to be scanned into EMR.

## 2024-10-16 NOTE — PROGRESS NOTES
Occupational Therapy  Facility/Department: [address]  Occupational Therapy Initial Assessment/Treatment    Name: Valery Ramirez  : 1950  MRN: [id number]  Date of Service: 10/16/2024    Discharge Recommendations:  24 hour supervision or assist, Home with Home health OT  OT Equipment Recommendations  Equipment Needed: No       Patient Diagnosis(es): The primary encounter diagnosis was Nausea and vomiting, unspecified vomiting type. Diagnoses of Generalized abdominal pain, PAD (peripheral artery disease) (HCC), and Atherosclerosis of arteries of extremities (HCC) were also pertinent to this visit.  Past Medical History:  has a past medical history of CHF (congestive heart failure) (HCC), COPD (chronic obstructive pulmonary disease) (HCC), Dysphagia, GI bleed, Gout, History of hemodialysis, Hyperlipidemia, Hypertension, Polyp of colon, Renal failure, and Voice disorder.  Past Surgical History:  has a past surgical history that includes Dialysis fistula creation; Total knee arthroplasty (Bilateral); Carpal tunnel release (Bilateral); Cholecystectomy; UPPP; Coronary angioplasty with stent; Enteroscopy (N/A, 2018); Upper gastrointestinal endoscopy (N/A, 2023); Upper gastrointestinal endoscopy (N/A, 2024); and Dialysis fistula creation (Right, 2024).    Treatment Diagnosis: Impaired ADL and functional mobility      Assessment  Performance deficits / Impairments: Decreased functional mobility ;Decreased ADL status;Decreased balance;Decreased endurance  Assessment: Pt is from home alone and independent.  Currently, pt req CG-SBA for ADL and functional mobility.  Pt req cues to slow down.  Pt plans to go home at discharge.  Recommend 24 hr assist and home OT.  Will follow for acute OT.  Treatment Diagnosis: Impaired ADL and functional mobility  Prognosis: Good  Decision Making: Medium Complexity  REQUIRES OT FOLLOW-UP: Yes  Activity Tolerance  Activity Tolerance: Patient Tolerated treatment

## 2024-10-16 NOTE — PROGRESS NOTES
Physical Therapy  [address]  Physical Therapy Initial Assessment/Treatment    Name: Valery Ramirez  : 1950  MRN: [id number]  Date of Service: 10/16/2024    Discharge Recommendations:  Home with Home health PT, Home with assist PRN   PT Equipment Recommendations  Equipment Needed: No      Patient Diagnosis(es): The primary encounter diagnosis was Nausea and vomiting, unspecified vomiting type. Diagnoses of Generalized abdominal pain, PAD (peripheral artery disease) (HCC), and Atherosclerosis of arteries of extremities (HCC) were also pertinent to this visit.  Past Medical History:  has a past medical history of CHF (congestive heart failure) (HCC), COPD (chronic obstructive pulmonary disease) (HCC), Dysphagia, GI bleed, Gout, History of hemodialysis, Hyperlipidemia, Hypertension, Polyp of colon, Renal failure, and Voice disorder.  Past Surgical History:  has a past surgical history that includes Dialysis fistula creation; Total knee arthroplasty (Bilateral); Carpal tunnel release (Bilateral); Cholecystectomy; UPPP; Coronary angioplasty with stent; Enteroscopy (N/A, 2018); Upper gastrointestinal endoscopy (N/A, 2023); Upper gastrointestinal endoscopy (N/A, 2024); and Dialysis fistula creation (Right, 2024).    Assessment  Body Structures, Functions, Activity Limitations Requiring Skilled Therapeutic Intervention: Decreased strength;Decreased functional mobility ;Decreased endurance;Decreased balance  Assessment: 74-year-old female with medical history significant for ESRD on hemodialysis MWF, type II DM, essential hypertension, hyperlipidemia, gout, dysphagia and voice disorder, COPD, HFpEF, severe peripheral artery disease presented with complaints of nausea, vomiting, diarrhea with abdominal pain as well as worsening right lower extremity pain over the past week. JERRICA negative for blood. She was admitted for concerns of right lower extremity ischemia and    Social/Functional History  Social/Functional History  Lives With: Alone  Type of Home: Apartment (ground floor)  Home Layout: One level  Home Access: Level entry  Bathroom Shower/Tub: Walk-in shower  Bathroom Toilet: Handicap height  Bathroom Equipment: Grab bars around toilet, Grab bars in shower, Shower chair, Hand-held shower  Home Equipment: Cane, Walker - 4-Wheeled, Alert button, Oxygen, Hospital bed, Lift chair, Walker - Rolling  Has the patient had two or more falls in the past year or any fall with injury in the past year?: No  ADL Assistance: Independent  Homemaking Assistance: Needs assistance ()  Ambulation Assistance: Independent (with 4 wheeled walker)  Transfer Assistance: Independent  Active : Yes  Leisure & Hobbies: Lydia, watch TV  Vision/Hearing  Vision  Vision: Impaired  Vision Exceptions: Wears glasses for reading  Hearing  Hearing: Exceptions to WFL  Hearing Exceptions: Bilateral hearing aid    Cognition   Orientation  Overall Orientation Status: Within Functional Limits    Objective  Temp: 98.9 °F (37.2 °C)  Pulse: 60  Heart Rate Source: Monitor  Respirations: 20  SpO2: 91 %  O2 Device: Nasal cannula  BP: (!) 110/52  MAP (Calculated): 71  BP Location: Left lower arm  BP Method: Automatic  Patient Position: Sitting             AROM RLE (degrees)  RLE AROM: WFL  AROM LLE (degrees)  LLE AROM : WFL  Strength RLE  Strength RLE: WFL  Strength LLE  Strength LLE: WFL        Balance  Sitting:  (CG-SBA sitting edge of bed and bending for lower body dressing)  Bed mobility  Supine to Sit: Stand by assistance  Transfers  Sit to Stand: Contact guard assistance;Stand by assistance  Stand to Sit: Contact guard assistance;Stand by assistance  Ambulation  Surface: Level tile  Device: Rolling Walker  Assistance: Stand by assistance;Contact guard assistance  Quality of Gait: moderate patrick, stride length and Larry. No overt LOB noted.  Distance: 3'+15'+40'     Balance  Posture: Good  Sitting

## 2024-10-16 NOTE — PROGRESS NOTES
Treatment time: 3 hrs    Net UF: 500 ml     Pre weight: 66 kg  Post weight: 65.5 kg     Access used: TEE AVF  Access function:  tolerated,  BFR 350ml/min  Active bleeding on pt previous cannulation site, pressure dressing applied, soaked the dressing 3 times, reapplied new dressing. Primary RN informed during post tx report     Medications or blood products given: albumin     Regular outpatient schedule:      Summary of response to treatment: Pt started with very low BP, Dr Beltran made aware, meds for bp medication ordered thru PMD. Pt tolerated full treatment. Pt remained stable throughout entire treatment and upon exiting the hemodialysis suite.      Copy of dialysis treatment record placed in chart, to be scanned into EMR.

## 2024-10-16 NOTE — PROGRESS NOTES
Vascular Surgery Daily Progress Note  Patient: Valery Ramirez    CC: R lower limb ischemia     Subjective :  Continues to endorse R foot pain. Denies fevers or chills.     ROS: A 14 point review of systems was conducted, significant findings as noted above. All other systems negative.    Objective :   Infusions:   dextrose      sodium chloride          I/O:I/O last 3 completed shifts:  In: 1420 [P.O.:1420]  Out: 100 [Urine:100]           Wt Readings from Last 1 Encounters:   10/15/24 66 kg (145 lb 8.1 oz)       Exam:/62   Pulse 68   Temp 98.5 °F (36.9 °C) (Oral)   Resp 18   Ht 1.626 m (5' 4\")   Wt 66 kg (145 lb 8.1 oz)   SpO2 91%   BMI 24.98 kg/m²     General appearance: alert, no acute distress, ill-appearing  Eyes: PERRL, no scleral icterus  Neck: trachea midline  Chest/Lungs: normal effort on 2L NC  Cardiovascular: RRR  Abdomen: soft, obese, tender to palpation throughout, non-distended, no guarding/rigidity  Skin: warm and dry, no rashes     Pulses     F P PT DP   R + -/+ - -/+   L + -/+ - -/+    +, -/+ ,-/-        LABS:   Recent Labs     10/14/24  1234 10/15/24  0639   WBC 2.7* 4.8   HGB 10.8* 10.1*   HCT 33.8* 31.6*   MCV 98.5 98.8   * 140        Recent Labs     10/14/24  1234 10/15/24  0639    136   K 3.6 3.8   CL 98* 98*   CO2 21 24   BUN 39* 44*   CREATININE 6.5* 6.6*        Recent Labs     10/14/24  1234   AST 28   ALT 11   BILITOT 0.7   ALKPHOS 108        Recent Labs     10/14/24  1234   LIPASE 54.0      No results for input(s): \"INR\", \"APTT\" in the last 72 hours.    Invalid input(s): \"PROT\"   No results for input(s): \"CKTOTAL\", \"CKMB\", \"CKMBINDEX\", \"TROPONINI\" in the last 72 hours.    ASSESSMENT/PLAN: Pt. is a 74 y.o. female Hx of CHF, COPD, dysphagia and voice disorder, HTN, HLD, ESRD on HD MWF, gout, T2DM, hx of GI bleed, who presents to the hospital for one week of increased abdominal pain and bloody diarrhea. The patient also noted that for the last week her right foot

## 2024-10-16 NOTE — PROGRESS NOTES
Blood glucose 24 upon return from dialysis. Patient was awake alert oriented and talking. Treated with O.J. lazaro crackers and peanut butter. Blood sugar came up to 105. Evening meal served and Dr. Patten informed. Stat lab glucose ordered. Will monitor and treat as needed.

## 2024-10-16 NOTE — PROGRESS NOTES
RIGHT ARM ARTERIOVENOUS FISTULA CREATION performed by Cornelius Woods II, MD at [facility] OR    ENTEROSCOPY N/A 08/29/2018    ENTEROSCOPY PUSH CONTROL HEMORRHAGE performed by Tai Chavarria MD at [facility] ENDOSCOPY    TOTAL KNEE ARTHROPLASTY Bilateral     UPPER GASTROINTESTINAL ENDOSCOPY N/A 05/05/2023    EGD CONTROL HEMORRHAGE performed by Tai Chavarria MD at [facility] ENDOSCOPY    UPPER GASTROINTESTINAL ENDOSCOPY N/A 05/08/2024    ENTEROSCOPY PUSH CONTROL HEMORRHAGE performed by Tony Maria MD at [facility] ENDOSCOPY    UPPP UVULOPALATOPHARYGOPLASTY         History reviewed. No pertinent family history.     reports that she has been smoking cigarettes. She has never used smokeless tobacco. She reports that she does not drink alcohol and does not use drugs.    Allergies:  Penicillins, Codeine, Lidocaine, and Methoxy polyethylene glycol-epoetin beta    Current Medications:    gabapentin (NEURONTIN) capsule 100 mg, TID  pantoprazole (PROTONIX) tablet 40 mg, QAM AC  allopurinol (ZYLOPRIM) tablet 200 mg, Daily  calcium acetate (PHOSLO) capsule 667 mg, TID  HYDROmorphone (DILAUDID) tablet 2 mg, Q4H PRN  amLODIPine (NORVASC) tablet 10 mg, Daily  atorvastatin (LIPITOR) tablet 80 mg, Nightly  calcitRIOL (ROCALTROL) capsule 0.5 mcg, Daily  cilostazol (PLETAL) tablet 100 mg, BID  clopidogrel (PLAVIX) tablet 75 mg, Daily  FLUoxetine (PROZAC) capsule 40 mg, Daily  metoprolol succinate (TOPROL XL) extended release tablet 50 mg, BID  rivaroxaban (XARELTO) tablet 2.5 mg, BID  glucose chewable tablet 16 g, PRN  dextrose bolus 10% 125 mL, PRN   Or  dextrose bolus 10% 250 mL, PRN  glucagon injection 1 mg, PRN  dextrose 10 % infusion, Continuous PRN  sodium chloride flush 0.9 % injection 5-40 mL, 2 times per day  sodium chloride flush 0.9 % injection 5-40 mL, PRN  0.9 % sodium chloride infusion, PRN  ondansetron (ZOFRAN-ODT) disintegrating tablet 4 mg, Q8H PRN   Or  ondansetron (ZOFRAN) injection 4 mg, Q6H PRN  insulin lispro  (HUMALOG,ADMELOG) injection vial 0-4 Units, 4x Daily AC & HS  aspirin EC tablet 81 mg, Daily      Physical exam:     Vitals:  BP (!) 110/52   Pulse 60   Temp 98.9 °F (37.2 °C) (Oral)   Resp 20   Ht 1.626 m (5' 4\")   Wt 66 kg (145 lb 8.1 oz)   SpO2 91%   BMI 24.98 kg/m²   Constitutional:  OAA X3 NAD Yes  Skin: no rash, turgor wnl  Heent:  eomi, mmm  Neck: no bruits or jvd noted  Cardiovascular:  S1, S2 without m/r/g  Respiratory: CTA B. On RA  Abdomen: soft, nt, nd  Ext:  lower extremity edema No  Psychiatric: mood and affect appropriate   Musculoskeletal:  Rom, muscular strength intact    Data:   Labs:  CBC:   Recent Labs     10/14/24  1234 10/15/24  0639   WBC 2.7* 4.8   HGB 10.8* 10.1*   * 140     BMP:    Recent Labs     10/14/24  1234 10/15/24  0639    136   K 3.6 3.8   CL 98* 98*   CO2 21 24   BUN 39* 44*   CREATININE 6.5* 6.6*   GLUCOSE 69* 79     Ca/Mg/Phos:   Recent Labs     10/14/24  1234 10/15/24  0639   CALCIUM 8.9 8.5     Hepatic:   Recent Labs     10/14/24  1234   AST 28   ALT 11   BILITOT 0.7   ALKPHOS 108     Troponin: No results for input(s): \"TROPONINI\" in the last 72 hours.  BNP: No results for input(s): \"BNP\" in the last 72 hours.  Lipids: No results for input(s): \"CHOL\", \"TRIG\", \"HDL\" in the last 72 hours.    Invalid input(s): \"LDLCALC\", \"LABVLDL\"  ABGs: No results for input(s): \"PHART\", \"PO2ART\", \"VSO9MVH\" in the last 72 hours.  INR: No results for input(s): \"INR\" in the last 72 hours.  UA:No results for input(s): \"COLORU\", \"CLARITYU\", \"GLUCOSEU\", \"BILIRUBINUR\", \"KETUA\", \"SPECGRAV\", \"BLOODU\", \"PHUR\", \"PROTEINU\", \"UROBILINOGEN\", \"NITRU\", \"LEUKOCYTESUR\", \"URINETYPE\" in the last 72 hours.    Invalid input(s): \"LABMICR\"   Urine Microscopic: No results for input(s): \"LABCAST\", \"BACTERIA\", \"COMU\", \"HYALCAST\", \"WBCUA\", \"RBCUA\" in the last 72 hours.    Invalid input(s): \"EPIU\"  Urine Culture: No results for input(s): \"LABURIN\" in the last 72 hours.  Urine Chemistry: No results for

## 2024-10-16 NOTE — PROGRESS NOTES
Found pill on bedside table mixed in with her water and glasses, appears to be her xaralto that was given to her last night. She states that she thought she took it with her peanut butter and crackers she was eating last night, but it appears that she did not take it. Medication disposed of.

## 2024-10-16 NOTE — PROGRESS NOTES
Hospital Medicine Progress Note      Date of Admission: 10/14/2024  Hospital Day: 3    Chief Admission Complaint: Right lower extremity pain and abdominal pain     Subjective: Patient seen and examined at bedside today.  Continues to complain of pain in her right lower extremity  She is requesting to speak with her granddaughter about her condition and ongoing treatment plan.  Blood pressure fluctuating between hyper and hypotension.  She is also having episodes of hypoglycemia    Presenting Admission History:       This is a 74-year-old female with medical history significant for ESRD on hemodialysis MWF, type II DM, essential hypertension, hyperlipidemia, gout, dysphagia and voice disorder, COPD, HFpEF, severe peripheral artery disease presented with complaints of nausea, vomiting, diarrhea with abdominal pain as well as worsening right lower extremity pain over the past week.  JERRICA negative for blood.  She was admitted for concerns of right lower extremity ischemia and gastroenteritis.    Assessment/Plan:      Worsening right lower extremity pain concerning for right lower extremity ischemia  CT abdominal aorta with bilateral runoff with contrast 10/14 1.  Extensive calcific atherosclerotic disease of both lower extremities with severe stenosis of the right common femoral artery and bilateral superficial femoral arteries. Additional evaluation of arterial structures is limited due to phase of contrast. 2.  Occlusion of the right renal artery and severe stenosis of the left renal artery. Atrophy of the right kidney is likely secondary to ischemia. 3.  Tortuous veins of the retroperitoneum involving the proximal duodenal wall.  X-ray of the right foot negative for any acute fracture.  Bilateral lower extremity arterial Doppler done 10/15 preliminary concerning for complete occlusion with no blood flow to the right.  Discussed with vascular surgery, recommend starting patient on IV heparin, with a plan for

## 2024-10-16 NOTE — DISCHARGE INSTRUCTIONS
Concerns of underlying ADALBERTO. Follow-up with PCP for sleep study  Continue aspirin  Check lab work CBC in 1 week and follow up with PCP  All BP medication was discontinued due to low normal BP, follow-up with PCP to resume them as appropriate.         Extra Heart Failure Education/ Tools/ Resources:     [email or web address]   --- this is American Heart Association interactive Healthier Living with Heart Failure guidebook.  Please click hyperlink or copy / paste link into search bar. The QR Code is also available below. Use your mouse to scroll through the pages.  Lots of information about weight monitoring, diet tips, activity, meds, etc    Heart Failure Tools and Resources QR Code is below. It includes multiple resources to include symptom tracker, med tracker, further HF info, and access to a HF Support Network online Community    HF [place] Jay[employer]  -- this is a free smart phone jayant available for iPhone and Android download.  Use your phone to track sodium / fluid intake, zone tool symptom tracking, weights, medications, etc. Click on this hyperlink  HF [place] [employer]   for QR code for easy download or the link is also found in the below HF Tools and Resources.      DASH (Dietary Approach to Stop Hypertension) diet --  https://www.nhlbi.nih.gov/education/dash-eating-plan -- this diet is a flexible eating plan that promotes heart healthy eating style.  Click on hyperlink or copy / paste link into search bar.  Lots of low sodium recipes and tips.    [email or web address]  -- more free recipes

## 2024-10-16 NOTE — PLAN OF CARE
Patient is progressing per care plan. No further abdominal pain has been noted. No N/V/D noted during the night. Pain in her leg has been under control. Right foot and leg have pulses with doppler and per vascular no surgical interventions needed at this time. She is walking with help to the bathroom and tolerating well. Fall precautions are still in place as her ambulation is impaired from her leg and foot. She has been on oxygen for decreased saturation. When awake, she will be in the mid 90% on room air but when asleep on room air she will drop into the 80%saturation and with oxygen at 2L she will slowly come back up to the mid 90%. Monitor has been Sinus to Sinus Mao with occasional PVCs. NO skin breakdown noted.

## 2024-10-17 ENCOUNTER — APPOINTMENT (OUTPATIENT)
Dept: CT IMAGING | Age: 74
DRG: 299 | End: 2024-10-17
Payer: MEDICARE

## 2024-10-17 ENCOUNTER — APPOINTMENT (OUTPATIENT)
Dept: GENERAL RADIOLOGY | Age: 74
DRG: 299 | End: 2024-10-17
Payer: MEDICARE

## 2024-10-17 LAB
ALBUMIN SERPL-MCNC: 3.6 G/DL (ref 3.4–5)
ANION GAP SERPL CALCULATED.3IONS-SCNC: 6 MMOL/L (ref 3–16)
APTT BLD: 41 SEC (ref 22.1–36.4)
APTT BLD: 41.9 SEC (ref 22.1–36.4)
APTT BLD: 61.3 SEC (ref 22.1–36.4)
APTT BLD: 77.3 SEC (ref 22.1–36.4)
BACTERIA URNS QL MICRO: ABNORMAL /HPF
BASOPHILS # BLD: 0 K/UL (ref 0–0.2)
BASOPHILS NFR BLD: 0.8 %
BILIRUB UR QL STRIP.AUTO: ABNORMAL
BUN SERPL-MCNC: 12 MG/DL (ref 7–20)
CALCIUM SERPL-MCNC: 8.2 MG/DL (ref 8.3–10.6)
CHLORIDE SERPL-SCNC: 101 MMOL/L (ref 99–110)
CLARITY UR: ABNORMAL
CO2 SERPL-SCNC: 27 MMOL/L (ref 21–32)
COLOR UR: YELLOW
CREAT SERPL-MCNC: 3.3 MG/DL (ref 0.6–1.2)
DEPRECATED RDW RBC AUTO: 16.3 % (ref 12.4–15.4)
ECHO BSA: 1.72 M2
EOSINOPHIL # BLD: 0.2 K/UL (ref 0–0.6)
EOSINOPHIL NFR BLD: 3.6 %
GFR SERPLBLD CREATININE-BSD FMLA CKD-EPI: 14 ML/MIN/{1.73_M2}
GLUCOSE BLD-MCNC: 109 MG/DL (ref 70–99)
GLUCOSE BLD-MCNC: 118 MG/DL (ref 70–99)
GLUCOSE BLD-MCNC: 141 MG/DL (ref 70–99)
GLUCOSE BLD-MCNC: 149 MG/DL (ref 70–99)
GLUCOSE SERPL-MCNC: 102 MG/DL (ref 70–99)
GLUCOSE UR STRIP.AUTO-MCNC: NEGATIVE MG/DL
HCT VFR BLD AUTO: 27 % (ref 36–48)
HGB BLD-MCNC: 8.7 G/DL (ref 12–16)
HGB UR QL STRIP.AUTO: ABNORMAL
KETONES UR STRIP.AUTO-MCNC: ABNORMAL MG/DL
LEUKOCYTE ESTERASE UR QL STRIP.AUTO: ABNORMAL
LYMPHOCYTES # BLD: 0.5 K/UL (ref 1–5.1)
LYMPHOCYTES NFR BLD: 10.7 %
MAGNESIUM SERPL-MCNC: 1.9 MG/DL (ref 1.8–2.4)
MCH RBC QN AUTO: 32 PG (ref 26–34)
MCHC RBC AUTO-ENTMCNC: 32.1 G/DL (ref 31–36)
MCV RBC AUTO: 99.6 FL (ref 80–100)
MONOCYTES # BLD: 0.5 K/UL (ref 0–1.3)
MONOCYTES NFR BLD: 11.7 %
NEUTROPHILS # BLD: 3.2 K/UL (ref 1.7–7.7)
NEUTROPHILS NFR BLD: 73.2 %
NITRITE UR QL STRIP.AUTO: NEGATIVE
PERFORMED ON: ABNORMAL
PH UR STRIP.AUTO: 5 [PH] (ref 5–8)
PHOSPHATE SERPL-MCNC: 2.7 MG/DL (ref 2.5–4.9)
PLATELET # BLD AUTO: 143 K/UL (ref 135–450)
PMV BLD AUTO: 10.4 FL (ref 5–10.5)
POTASSIUM SERPL-SCNC: 4.7 MMOL/L (ref 3.5–5.1)
PROT UR STRIP.AUTO-MCNC: >=300 MG/DL
RBC # BLD AUTO: 2.71 M/UL (ref 4–5.2)
RBC #/AREA URNS HPF: ABNORMAL /HPF (ref 0–4)
SODIUM SERPL-SCNC: 134 MMOL/L (ref 136–145)
SP GR UR STRIP.AUTO: >=1.03 (ref 1–1.03)
UA DIPSTICK W REFLEX MICRO PNL UR: YES
URN SPEC COLLECT METH UR: ABNORMAL
UROBILINOGEN UR STRIP-ACNC: 0.2 E.U./DL
VAS LEFT ATA DIST PSV: 20.9 CM/S
VAS LEFT CFA DIST PSV: 166 CM/S
VAS LEFT CFA PROX PSV: 268 CM/S
VAS LEFT PERONEAL MID PSV: 35.7 CM/S
VAS LEFT PFA PROX PSV: 210 CM/S
VAS LEFT POP A DIST PSV: 80.1 CM/S
VAS LEFT POP A PROX PSV: 84.5 CM/S
VAS LEFT PTA DIST PSV: 0 CM/S
VAS LEFT PTA MID PSV: 20.9 CM/S
VAS LEFT SFA DIST PSV: 0 CM/S
VAS LEFT SFA DIST VEL RATIO: 0
VAS LEFT SFA MID PSV: 22 CM/S
VAS LEFT SFA MID VEL RATIO: 0.49
VAS LEFT SFA PROX PSV: 44.8 CM/S
VAS LEFT SFA PROX VEL RATIO: 0.17
VAS RIGHT ATA DIST PSV: 18 CM/S
VAS RIGHT CFA DIST PSV: 171 CM/S
VAS RIGHT CFA PROX PSV: 207 CM/S
VAS RIGHT PERONEAL MID PSV: 0 CM/S
VAS RIGHT PFA PROX PSV: 183 CM/S
VAS RIGHT POP A DIST PSV: 16.3 CM/S
VAS RIGHT POP A PROX PSV: 31.2 CM/S
VAS RIGHT POP A PROX VEL RATIO: 3.9
VAS RIGHT PTA DIST PSV: 0 CM/S
VAS RIGHT PTA MID PSV: 0 CM/S
VAS RIGHT SFA DIST PSV: 8 CM/S
VAS RIGHT SFA MID PSV: 0 CM/S
VAS RIGHT SFA MID VEL RATIO: 0
VAS RIGHT SFA PROX PSV: 37.9 CM/S
VAS RIGHT SFA PROX VEL RATIO: 0.2
WBC # BLD AUTO: 4.4 K/UL (ref 4–11)
WBC #/AREA URNS HPF: ABNORMAL /HPF (ref 0–5)

## 2024-10-17 PROCEDURE — 2500000003 HC RX 250 WO HCPCS

## 2024-10-17 PROCEDURE — 70450 CT HEAD/BRAIN W/O DYE: CPT

## 2024-10-17 PROCEDURE — 83735 ASSAY OF MAGNESIUM: CPT

## 2024-10-17 PROCEDURE — 1200000000 HC SEMI PRIVATE

## 2024-10-17 PROCEDURE — 6360000002 HC RX W HCPCS

## 2024-10-17 PROCEDURE — 71045 X-RAY EXAM CHEST 1 VIEW: CPT

## 2024-10-17 PROCEDURE — 85730 THROMBOPLASTIN TIME PARTIAL: CPT

## 2024-10-17 PROCEDURE — 87040 BLOOD CULTURE FOR BACTERIA: CPT

## 2024-10-17 PROCEDURE — 6370000000 HC RX 637 (ALT 250 FOR IP): Performed by: INTERNAL MEDICINE

## 2024-10-17 PROCEDURE — 80069 RENAL FUNCTION PANEL: CPT

## 2024-10-17 PROCEDURE — 85025 COMPLETE CBC W/AUTO DIFF WBC: CPT

## 2024-10-17 PROCEDURE — 81001 URINALYSIS AUTO W/SCOPE: CPT

## 2024-10-17 PROCEDURE — 99233 SBSQ HOSP IP/OBS HIGH 50: CPT | Performed by: SURGERY

## 2024-10-17 PROCEDURE — 6370000000 HC RX 637 (ALT 250 FOR IP)

## 2024-10-17 PROCEDURE — 2580000003 HC RX 258: Performed by: INTERNAL MEDICINE

## 2024-10-17 PROCEDURE — 2580000003 HC RX 258

## 2024-10-17 PROCEDURE — 36415 COLL VENOUS BLD VENIPUNCTURE: CPT

## 2024-10-17 PROCEDURE — 99233 SBSQ HOSP IP/OBS HIGH 50: CPT | Performed by: INTERNAL MEDICINE

## 2024-10-17 PROCEDURE — 93925 LOWER EXTREMITY STUDY: CPT | Performed by: SURGERY

## 2024-10-17 RX ORDER — ACETAMINOPHEN 325 MG/1
650 TABLET ORAL EVERY 6 HOURS PRN
Status: DISCONTINUED | OUTPATIENT
Start: 2024-10-17 | End: 2024-10-23 | Stop reason: HOSPADM

## 2024-10-17 RX ADMIN — CALCIUM ACETATE 667 MG: 667 CAPSULE ORAL at 14:45

## 2024-10-17 RX ADMIN — CILOSTAZOL 100 MG: 50 TABLET ORAL at 08:18

## 2024-10-17 RX ADMIN — CALCITRIOL CAPSULES 0.25 MCG 0.5 MCG: 0.25 CAPSULE ORAL at 08:18

## 2024-10-17 RX ADMIN — CALCIUM ACETATE 667 MG: 667 CAPSULE ORAL at 20:31

## 2024-10-17 RX ADMIN — CLOPIDOGREL BISULFATE 75 MG: 75 TABLET ORAL at 08:18

## 2024-10-17 RX ADMIN — HYDROMORPHONE HYDROCHLORIDE 2 MG: 2 TABLET ORAL at 13:35

## 2024-10-17 RX ADMIN — ACETAMINOPHEN 650 MG: 325 TABLET ORAL at 06:25

## 2024-10-17 RX ADMIN — ATORVASTATIN CALCIUM 80 MG: 80 TABLET, FILM COATED ORAL at 20:31

## 2024-10-17 RX ADMIN — HYDROMORPHONE HYDROCHLORIDE 2 MG: 2 TABLET ORAL at 18:04

## 2024-10-17 RX ADMIN — WATER 1000 MG: 1 INJECTION INTRAMUSCULAR; INTRAVENOUS; SUBCUTANEOUS at 17:41

## 2024-10-17 RX ADMIN — CALCIUM ACETATE 667 MG: 667 CAPSULE ORAL at 08:18

## 2024-10-17 RX ADMIN — METOPROLOL SUCCINATE 50 MG: 50 TABLET, EXTENDED RELEASE ORAL at 20:31

## 2024-10-17 RX ADMIN — ALLOPURINOL 200 MG: 100 TABLET ORAL at 08:19

## 2024-10-17 RX ADMIN — CILOSTAZOL 100 MG: 50 TABLET ORAL at 20:31

## 2024-10-17 RX ADMIN — GABAPENTIN 100 MG: 100 CAPSULE ORAL at 08:18

## 2024-10-17 RX ADMIN — PANTOPRAZOLE SODIUM 40 MG: 40 TABLET, DELAYED RELEASE ORAL at 06:25

## 2024-10-17 RX ADMIN — FLUOXETINE HYDROCHLORIDE 40 MG: 20 CAPSULE ORAL at 08:18

## 2024-10-17 RX ADMIN — ASPIRIN 81 MG: 81 TABLET, COATED ORAL at 08:18

## 2024-10-17 RX ADMIN — SODIUM CHLORIDE, PRESERVATIVE FREE 10 ML: 5 INJECTION INTRAVENOUS at 20:31

## 2024-10-17 RX ADMIN — SODIUM CHLORIDE, PRESERVATIVE FREE 10 ML: 5 INJECTION INTRAVENOUS at 08:19

## 2024-10-17 RX ADMIN — HEPARIN SODIUM 16 UNITS/KG/HR: 10000 INJECTION, SOLUTION INTRAVENOUS at 19:24

## 2024-10-17 ASSESSMENT — PAIN DESCRIPTION - DESCRIPTORS
DESCRIPTORS: ACHING

## 2024-10-17 ASSESSMENT — PAIN DESCRIPTION - ONSET
ONSET: ON-GOING
ONSET: ON-GOING

## 2024-10-17 ASSESSMENT — PAIN DESCRIPTION - LOCATION
LOCATION: FOOT

## 2024-10-17 ASSESSMENT — PAIN DESCRIPTION - PAIN TYPE
TYPE: ACUTE PAIN
TYPE: ACUTE PAIN

## 2024-10-17 ASSESSMENT — PAIN DESCRIPTION - ORIENTATION
ORIENTATION: RIGHT

## 2024-10-17 ASSESSMENT — PAIN - FUNCTIONAL ASSESSMENT
PAIN_FUNCTIONAL_ASSESSMENT: ACTIVITIES ARE NOT PREVENTED

## 2024-10-17 ASSESSMENT — PAIN SCALES - GENERAL
PAINLEVEL_OUTOF10: 9
PAINLEVEL_OUTOF10: 0
PAINLEVEL_OUTOF10: 7
PAINLEVEL_OUTOF10: 8
PAINLEVEL_OUTOF10: 9
PAINLEVEL_OUTOF10: 0
PAINLEVEL_OUTOF10: 7

## 2024-10-17 ASSESSMENT — PAIN DESCRIPTION - FREQUENCY
FREQUENCY: CONTINUOUS
FREQUENCY: CONTINUOUS

## 2024-10-17 NOTE — PROGRESS NOTES
Hospital Medicine Progress Note      Date of Admission: 10/14/2024  Hospital Day: 4    Chief Admission Complaint: Right lower extremity pain and abdominal pain     Subjective: Patient seen and examined at bedside today.  Had a temperature of 101.8 earlier this morning  Patient is confused this morning and questioning about some colonoscopy procedure.    Presenting Admission History:       This is a 74-year-old female with medical history significant for ESRD on hemodialysis MWF, type II DM, essential hypertension, hyperlipidemia, gout, dysphagia and voice disorder, COPD, HFpEF, severe peripheral artery disease presented with complaints of nausea, vomiting, diarrhea with abdominal pain as well as worsening right lower extremity pain over the past week.  JERRICA negative for blood.  She was admitted for concerns of right lower extremity ischemia and gastroenteritis.    Assessment/Plan:      Altered mental status  Urinary tract infection  Given the recent change in mentation with confusion, CT head stat was done without contrast which did not reveal any acute bleeding.  There was concerns of underlying infection as she was febrile with new onset of altered mentation.    Sepsis workup including blood culture, UA done.  UA significant for infection, will obtain urine culture.  Start patient on Rocephin      Worsening right lower extremity pain concerning for right lower extremity ischemia  CT abdominal aorta with bilateral runoff with contrast 10/14 1.  Extensive calcific atherosclerotic disease of both lower extremities with severe stenosis of the right common femoral artery and bilateral superficial femoral arteries. Additional evaluation of arterial structures is limited due to phase of contrast. 2.  Occlusion of the right renal artery and severe stenosis of the left renal artery. Atrophy of the right kidney is likely secondary to ischemia. 3.  Tortuous veins of the retroperitoneum involving the proximal duodenal

## 2024-10-17 NOTE — PROGRESS NOTES
Nephrology Progress Note                                                                                                                                                                                                                                                                                                                                                               Office : [phone]     Fax :[phone]    Patient's Name: Valery Ramirez  8:52 AM  10/17/2024    Reason for Consult:  ESRD on HD  Requesting Physician:  Tori Mora MD (Inactive)  Chief Complaint:    Chief Complaint   Patient presents with    Diarrhea     Pt reports bloody diarrhea since Wednesday.     Nausea    Emesis       Assessment/Plan     # ESRD on HD   - Dialyzes on a MWF schedule  - S/p HD yesterday. Next HD tomorrow 10/18. Will attempt UF as able  - Renally dose meds for ESRD  - Monitor renal labs     # HTN  - BP's soft. Hold CCB  - Monitor     # Anemia of chronic disease  - Hgb trending down. Add UMBERTO with HD   - Monitor     # Acid- base/ Electrolyte imbalance   - Lytes stable  - Monitor     # MBD management  - Cont home Calcitriol 0.5 mcg daily   - Phoslo with meals   - Low phos diet     # DM2   - Insulin management per primary     # CHF  - Cont vol management with HD   - Attempt UF as able - limited thus far by BP    # Worsening abdominal pain - Resolved   - Felt to be viral in etiology  - Symptomatic management for now     # R lower limb ischemia   - S/p bilateral runoff angiogram 9/27 with extensive calcifications  - Bilateral arterial - complete occlusion with no blood flow to the right   - Vascular surgery consulted - started on Heparin gtt   - Plan for RLE arteriogram on Monday 10/21         History of Present Ilness:    Valery Ramirez is a 74 y.o. female with a PMH of ESRD on HD, HTN, HLD, COPD, CHF, gout, DM2, h/o GI bleed, who presents to the hospital for one week of increased abdominal pain and bloody

## 2024-10-17 NOTE — CARE COORDINATION
10:03 AM  Plan for RLE Anteriogram on Monday per vascular.   CM continues to follow for dc planning and support.     Electronically signed by Jamar Blue RN, CM on 10/17/2024 at 11:28 AM.  Phone: [phone]  Fax: [phone]

## 2024-10-17 NOTE — PROGRESS NOTES
Comprehensive Nutrition Assessment    RECOMMENDATIONS:  PO Diet: Regular, CCC-3, Low Fat/Low Chol/High Fiber/2 gm Na, Low Phosphorus   Nutrition Supplement: Will order Nepro once daily   Nutrition Education: Education not indicated     NUTRITION ASSESSMENT:   Nutritional summary & status: Positive screen for poor intake and wt loss and consult for ONS. Presented to ED w/ bloody diarrhea and intractable N/V. No episodes emesis since admission per flowsheets and per internal med note 10/15, pt continues to have upper abdominal pain but is tolerating oral diet. Per wt hx in EMR, pt has had 6% wt loss over 5 months which is not considered signficant according to ASPEN malnutrition criteria; wt fluctuations likely d/t fluid status as pt w/ ESRD on HD MWF. Upon visit, pt agitated and trying to get out of bed; found pt's nurse to assist w/ needs. Unable to gather information from pt. Per nephrology note 10/16, pt ate breakfast w/o issue. Documented PO intake per EMR shows most meals at % consumed. In terms of ONS, no indication for Glucerna as hbA1c 4.7% 10/16; will order Nepro once daily since pt w/ increased nutrient needs r/t ESRD on HD and on low phos diet. Will monitor ONS acceptance, PO intake, and any other changes to nutritional status.   Admission // PMH: Abdominal pain // ESRD on HD MWF, T2DM, essential HTN, HLD, gout, dysphagia, voice disorder, COPD, CHF, severe PAD    MALNUTRITION ASSESSMENT  Context of Malnutrition: Chronic Illness   Malnutrition Status: At risk for malnutrition (Comment) (if PO intake decreases again)  Findings of the 6 clinical characteristics of malnutrition (Minimum of 2 out of 6 clinical characteristics is required to make the diagnosis of moderate or severe Protein Calorie Malnutrition based on AND/ASPEN Guidelines):  Energy Intake:  Mild decrease in energy intake (Comment) (pt reports poor intake pta, now improving)  Weight Loss:  Mild weight loss (specify amount and time

## 2024-10-17 NOTE — PROGRESS NOTES
Last aPTT draw was 0523 this morning. This RN contacted lab twice throughout the day to draw a new aPTT for heparin titration. Both attempts unsuccessful as lab did not come. Placed stat aPTT order and contacted lab at this time.

## 2024-10-17 NOTE — ACP (ADVANCE CARE PLANNING)
Advance Care Planning     Advance Care Planning Inpatient Note  [facility] Department    Today's Date: 10/17/2024  Unit: [facility] 4 PCU    Received request from IDT Member.  Upon review of chart and communication with care team, patient's decision making abilities are not in question.. Patient and granddaughter  was/were present in the room during visit.    Goals of ACP Conversation:  Discuss advance care planning documents    Health Care Decision Makers:       Primary Decision Maker: Marsha Arshad - Grandchild - [phone]    Secondary Decision Maker: Margareth Prado - Friend - [phone]  Summary:  Completed New Documents  Updated Healthcare Decision Maker    Advance Care Planning Documents (Patient Wishes):  Healthcare Power of /Advance Directive Appointment of Health Care Agent     Assessment:  Pt has 2 daughter who are not so much involved with pt. Pt shared that her sister who used to be POA just . Pt named her granddaughter, Marsha and her friend, Margareth as primary and secondary decision maker respectively. Pt wants her granddaughter Marsha to make EOL decisions for her, whenever the time comes. \"She knows everything that I want and we have discussed everything.\" New document has been scanned and uploaded into pt's electronic medical records.    Interventions:  Provided education on documents for clarity and greater understanding  Assisted in the completion of documents according to patient's wishes at this time    Outcomes/Plan:  New advance directive completed.  Returned original document(s) to patient, as well as copies for distribution to appointed agents  Teach Back Method used to verify the patient's and/or Healthcare Decision Maker's understanding of key information in the advance directive documents      Electronically signed by Chaplain Mp on 10/17/2024 at 4:16 PM

## 2024-10-17 NOTE — PROGRESS NOTES
Vascular Surgery Daily Progress Note  Patient: Valery Ramirez    CC: R lower limb ischemia     Subjective :  Continues to endorse R foot pain. Denies fevers or chills.     ROS: A 14 point review of systems was conducted, significant findings as noted above. All other systems negative.    Objective :     Infusions:   heparin (PORCINE) Infusion 14 Units/kg/hr (10/17/24 0303)    dextrose      sodium chloride          I/O:I/O last 3 completed shifts:  In: 1290 [P.O.:1290]  Out: 300 [Urine:300]           Wt Readings from Last 1 Encounters:   10/16/24 65.5 kg (144 lb 6.4 oz)       Exam:BP (!) 130/54   Pulse 74   Temp (!) 101.8 °F (38.8 °C) (Oral)   Resp 18   Ht 1.626 m (5' 4\")   Wt 65.5 kg (144 lb 6.4 oz)   SpO2 98%   BMI 24.79 kg/m²     General appearance: alert, no acute distress, ill-appearing  Eyes: PERRL, no scleral icterus  Neck: trachea midline  Chest/Lungs: normal effort on 2L NC  Cardiovascular: RRR  Abdomen: soft, obese, tender to palpation throughout, non-distended, no guarding/rigidity  Skin: warm and dry, no rashes     Pulses     F P PT DP   R + -/+ - -/+   L + -/+ -/+ -/+    +, -/+ ,-/-        LABS:   Recent Labs     10/16/24  0743 10/17/24  0523   WBC 4.6 4.4   HGB 9.6* 8.7*   HCT 29.9* 27.0*   MCV 98.3 99.6    143        Recent Labs     10/16/24  0743 10/17/24  0523    134*   K 3.8 4.7    101   CO2 27 27   PHOS  --  2.7   BUN 21* 12   CREATININE 4.6* 3.3*        Recent Labs     10/14/24  1234   AST 28   ALT 11   BILITOT 0.7   ALKPHOS 108        Recent Labs     10/14/24  1234   LIPASE 54.0        Recent Labs     10/16/24  0743 10/16/24  1817 10/17/24  0004 10/17/24  0523   INR 1.18*  --   --   --    APTT 30.7   < > 41.0* 41.9*    < > = values in this interval not displayed.      No results for input(s): \"CKTOTAL\", \"CKMB\", \"CKMBINDEX\", \"TROPONINI\" in the last 72 hours.    ASSESSMENT/PLAN: Pt. is a 74 y.o. female Hx of CHF, COPD, dysphagia and voice disorder, HTN, HLD, ESRD on

## 2024-10-18 LAB
ANION GAP SERPL CALCULATED.3IONS-SCNC: 7 MMOL/L (ref 3–16)
APTT BLD: 75.4 SEC (ref 22.1–36.4)
BASOPHILS # BLD: 0 K/UL (ref 0–0.2)
BASOPHILS NFR BLD: 0.6 %
BUN SERPL-MCNC: 24 MG/DL (ref 7–20)
CALCIUM SERPL-MCNC: 8.8 MG/DL (ref 8.3–10.6)
CHLORIDE SERPL-SCNC: 100 MMOL/L (ref 99–110)
CO2 SERPL-SCNC: 27 MMOL/L (ref 21–32)
CREAT SERPL-MCNC: 5.4 MG/DL (ref 0.6–1.2)
DEPRECATED RDW RBC AUTO: 16.1 % (ref 12.4–15.4)
EOSINOPHIL # BLD: 0.1 K/UL (ref 0–0.6)
EOSINOPHIL NFR BLD: 3.4 %
GFR SERPLBLD CREATININE-BSD FMLA CKD-EPI: 8 ML/MIN/{1.73_M2}
GLUCOSE BLD-MCNC: 104 MG/DL (ref 70–99)
GLUCOSE BLD-MCNC: 108 MG/DL (ref 70–99)
GLUCOSE BLD-MCNC: 108 MG/DL (ref 70–99)
GLUCOSE BLD-MCNC: 115 MG/DL (ref 70–99)
GLUCOSE SERPL-MCNC: 107 MG/DL (ref 70–99)
HCT VFR BLD AUTO: 24.7 % (ref 36–48)
HGB BLD-MCNC: 8 G/DL (ref 12–16)
LYMPHOCYTES # BLD: 0.3 K/UL (ref 1–5.1)
LYMPHOCYTES NFR BLD: 9 %
MCH RBC QN AUTO: 32.1 PG (ref 26–34)
MCHC RBC AUTO-ENTMCNC: 32.5 G/DL (ref 31–36)
MCV RBC AUTO: 98.7 FL (ref 80–100)
MONOCYTES # BLD: 0.4 K/UL (ref 0–1.3)
MONOCYTES NFR BLD: 9.8 %
NEUTROPHILS # BLD: 2.8 K/UL (ref 1.7–7.7)
NEUTROPHILS NFR BLD: 77.2 %
PERFORMED ON: ABNORMAL
PLATELET # BLD AUTO: 98 K/UL (ref 135–450)
PMV BLD AUTO: 10.8 FL (ref 5–10.5)
POTASSIUM SERPL-SCNC: 4.3 MMOL/L (ref 3.5–5.1)
RBC # BLD AUTO: 2.5 M/UL (ref 4–5.2)
SODIUM SERPL-SCNC: 134 MMOL/L (ref 136–145)
WBC # BLD AUTO: 3.7 K/UL (ref 4–11)

## 2024-10-18 PROCEDURE — 85730 THROMBOPLASTIN TIME PARTIAL: CPT

## 2024-10-18 PROCEDURE — 36415 COLL VENOUS BLD VENIPUNCTURE: CPT

## 2024-10-18 PROCEDURE — 6360000002 HC RX W HCPCS: Performed by: INTERNAL MEDICINE

## 2024-10-18 PROCEDURE — 86704 HEP B CORE ANTIBODY TOTAL: CPT

## 2024-10-18 PROCEDURE — 6360000002 HC RX W HCPCS: Performed by: PHYSICIAN ASSISTANT

## 2024-10-18 PROCEDURE — 85025 COMPLETE CBC W/AUTO DIFF WBC: CPT

## 2024-10-18 PROCEDURE — 99233 SBSQ HOSP IP/OBS HIGH 50: CPT | Performed by: SURGERY

## 2024-10-18 PROCEDURE — 6370000000 HC RX 637 (ALT 250 FOR IP): Performed by: INTERNAL MEDICINE

## 2024-10-18 PROCEDURE — 90935 HEMODIALYSIS ONE EVALUATION: CPT | Performed by: INTERNAL MEDICINE

## 2024-10-18 PROCEDURE — P9047 ALBUMIN (HUMAN), 25%, 50ML: HCPCS | Performed by: INTERNAL MEDICINE

## 2024-10-18 PROCEDURE — 6360000002 HC RX W HCPCS

## 2024-10-18 PROCEDURE — 87340 HEPATITIS B SURFACE AG IA: CPT

## 2024-10-18 PROCEDURE — 1200000000 HC SEMI PRIVATE

## 2024-10-18 PROCEDURE — 86706 HEP B SURFACE ANTIBODY: CPT

## 2024-10-18 PROCEDURE — 6370000000 HC RX 637 (ALT 250 FOR IP)

## 2024-10-18 PROCEDURE — 2580000003 HC RX 258: Performed by: INTERNAL MEDICINE

## 2024-10-18 PROCEDURE — 2500000003 HC RX 250 WO HCPCS

## 2024-10-18 PROCEDURE — 2580000003 HC RX 258

## 2024-10-18 PROCEDURE — 80048 BASIC METABOLIC PNL TOTAL CA: CPT

## 2024-10-18 PROCEDURE — 90935 HEMODIALYSIS ONE EVALUATION: CPT

## 2024-10-18 RX ORDER — CALCIUM CARBONATE 500 MG/1
500 TABLET, CHEWABLE ORAL 3 TIMES DAILY PRN
Status: DISCONTINUED | OUTPATIENT
Start: 2024-10-18 | End: 2024-10-23 | Stop reason: HOSPADM

## 2024-10-18 RX ADMIN — ASPIRIN 81 MG: 81 TABLET, COATED ORAL at 07:56

## 2024-10-18 RX ADMIN — WATER 1000 MG: 1 INJECTION INTRAMUSCULAR; INTRAVENOUS; SUBCUTANEOUS at 18:19

## 2024-10-18 RX ADMIN — ALBUMIN (HUMAN) 25 G: 0.25 INJECTION, SOLUTION INTRAVENOUS at 10:12

## 2024-10-18 RX ADMIN — HEPARIN SODIUM 16 UNITS/KG/HR: 10000 INJECTION, SOLUTION INTRAVENOUS at 19:38

## 2024-10-18 RX ADMIN — ALLOPURINOL 200 MG: 100 TABLET ORAL at 07:56

## 2024-10-18 RX ADMIN — CALCITRIOL CAPSULES 0.25 MCG 0.5 MCG: 0.25 CAPSULE ORAL at 07:57

## 2024-10-18 RX ADMIN — EPOETIN ALFA-EPBX 6000 UNITS: 3000 INJECTION, SOLUTION INTRAVENOUS; SUBCUTANEOUS at 11:47

## 2024-10-18 RX ADMIN — CALCIUM ACETATE 667 MG: 667 CAPSULE ORAL at 21:38

## 2024-10-18 RX ADMIN — CLOPIDOGREL BISULFATE 75 MG: 75 TABLET ORAL at 07:55

## 2024-10-18 RX ADMIN — ATORVASTATIN CALCIUM 80 MG: 80 TABLET, FILM COATED ORAL at 21:38

## 2024-10-18 RX ADMIN — SODIUM CHLORIDE, PRESERVATIVE FREE 10 ML: 5 INJECTION INTRAVENOUS at 07:57

## 2024-10-18 RX ADMIN — CALCIUM ACETATE 667 MG: 667 CAPSULE ORAL at 15:26

## 2024-10-18 RX ADMIN — CILOSTAZOL 100 MG: 50 TABLET ORAL at 07:57

## 2024-10-18 RX ADMIN — HYDROMORPHONE HYDROCHLORIDE 2 MG: 2 TABLET ORAL at 15:27

## 2024-10-18 RX ADMIN — FLUOXETINE HYDROCHLORIDE 40 MG: 20 CAPSULE ORAL at 07:55

## 2024-10-18 RX ADMIN — METOPROLOL SUCCINATE 50 MG: 50 TABLET, EXTENDED RELEASE ORAL at 07:56

## 2024-10-18 RX ADMIN — PANTOPRAZOLE SODIUM 40 MG: 40 TABLET, DELAYED RELEASE ORAL at 06:06

## 2024-10-18 RX ADMIN — CALCIUM ACETATE 667 MG: 667 CAPSULE ORAL at 07:55

## 2024-10-18 RX ADMIN — CALCIUM CARBONATE 500 MG: 500 TABLET, CHEWABLE ORAL at 09:31

## 2024-10-18 ASSESSMENT — PAIN DESCRIPTION - LOCATION: LOCATION: FOOT

## 2024-10-18 ASSESSMENT — PAIN DESCRIPTION - ONSET: ONSET: ON-GOING

## 2024-10-18 ASSESSMENT — PAIN DESCRIPTION - FREQUENCY: FREQUENCY: CONTINUOUS

## 2024-10-18 ASSESSMENT — PAIN SCALES - GENERAL
PAINLEVEL_OUTOF10: 9
PAINLEVEL_OUTOF10: 6

## 2024-10-18 ASSESSMENT — PAIN - FUNCTIONAL ASSESSMENT: PAIN_FUNCTIONAL_ASSESSMENT: ACTIVITIES ARE NOT PREVENTED

## 2024-10-18 ASSESSMENT — PAIN DESCRIPTION - ORIENTATION: ORIENTATION: RIGHT

## 2024-10-18 ASSESSMENT — PAIN DESCRIPTION - DESCRIPTORS: DESCRIPTORS: ACHING

## 2024-10-18 ASSESSMENT — PAIN DESCRIPTION - PAIN TYPE: TYPE: ACUTE PAIN

## 2024-10-18 NOTE — CARE COORDINATION
9:32 AM  Plan for RLE anteriogram on Monday with Vascular team.  On heparin gtt.  CM following for dc planning and support.     Electronically signed by Jamar Blue RN, CM on 10/18/2024 at 9:35 AM.  Phone: [phone]  Fax: [phone]

## 2024-10-18 NOTE — PROGRESS NOTES
Vascular Surgery Daily Progress Note  Patient: Valery Ramirez    CC: R lower limb ischemia     Subjective :  Continues to endorse R foot pain. Denies fevers or chills.     ROS: A 14 point review of systems was conducted, significant findings as noted above. All other systems negative.    Objective :     Infusions:   heparin (PORCINE) Infusion 16 Units/kg/hr (10/18/24 0022)    dextrose      sodium chloride          I/O:I/O last 3 completed shifts:  In: 890 [P.O.:890]  Out: 100 [Urine:100]           Wt Readings from Last 1 Encounters:   10/16/24 65.5 kg (144 lb 6.4 oz)       Exam:BP (!) 150/73   Pulse 65   Temp 98.6 °F (37 °C) (Oral)   Resp 18   Ht 1.626 m (5' 4\")   Wt 65.5 kg (144 lb 6.4 oz)   SpO2 95%   BMI 24.79 kg/m²     General appearance: alert, no acute distress, ill-appearing  Eyes: PERRL, no scleral icterus  Neck: trachea midline  Chest/Lungs: normal effort on 2L NC  Cardiovascular: RRR  Abdomen: soft, obese, tender to palpation throughout, non-distended, no guarding/rigidity  Skin: warm and dry, no rashes     Pulses     F P PT DP   R + -/+ - -/+   L + -/+ -/+ -/+    +, -/+ ,-/-        LABS:   Recent Labs     10/16/24  0743 10/17/24  0523   WBC 4.6 4.4   HGB 9.6* 8.7*   HCT 29.9* 27.0*   MCV 98.3 99.6    143        Recent Labs     10/16/24  0743 10/17/24  0523    134*   K 3.8 4.7    101   CO2 27 27   PHOS  --  2.7   BUN 21* 12   CREATININE 4.6* 3.3*        No results for input(s): \"AST\", \"ALT\", \"BILIDIR\", \"BILITOT\", \"ALKPHOS\" in the last 72 hours.    Invalid input(s): \"ALB\"       No results for input(s): \"LIPASE\", \"AMYLASE\" in the last 72 hours.       Recent Labs     10/16/24  0743 10/16/24  1817 10/17/24  1550 10/17/24  2307   INR 1.18*  --   --   --    APTT 30.7   < > 61.3* 77.3*    < > = values in this interval not displayed.      No results for input(s): \"CKTOTAL\", \"CKMB\", \"CKMBINDEX\", \"TROPONINI\" in the last 72 hours.    ASSESSMENT/PLAN: Pt. is a 74 y.o. female Hx of CHF,

## 2024-10-18 NOTE — PROGRESS NOTES
Hospital Medicine Progress Note      Date of Admission: 10/14/2024  Hospital Day: 5    Chief Admission Complaint: Right lower extremity pain and abdominal pain     Subjective: Patient seen and examined at bedside today.  No episodes of fever since yesterday morning  On 2L NC  Complaining of heartburn this morning  Mentation is back to baseline  Still complains of pain in her right lower extremity  No further episodes of hypoglycemia in the last 24 hours  Presenting Admission History:       This is a 74-year-old female with medical history significant for ESRD on hemodialysis MWF, type II DM, essential hypertension, hyperlipidemia, gout, dysphagia and voice disorder, COPD, HFpEF, severe peripheral artery disease presented with complaints of nausea, vomiting, diarrhea with abdominal pain as well as worsening right lower extremity pain over the past week.  JERRICA negative for blood.  She was admitted for concerns of right lower extremity ischemia and gastroenteritis.    Assessment/Plan:      Altered mental status, resolved  Urinary tract infection  Given the recent change in mentation with confusion on 10/17, CT head stat was done without contrast which did not reveal any acute bleeding.  There was concerns of underlying infection as she was febrile with new onset of altered mentation.    Blood culture no growth to date  UA significant for infection, will obtain urine culture.  Continue Rocephin      Worsening right lower extremity pain concerning for right lower extremity ischemia  CT abdominal aorta with bilateral runoff with contrast 10/14 1.  Extensive calcific atherosclerotic disease of both lower extremities with severe stenosis of the right common femoral artery and bilateral superficial femoral arteries. Additional evaluation of arterial structures is limited due to phase of contrast. 2.  Occlusion of the right renal artery and severe stenosis of the left renal artery. Atrophy of the right kidney is likely  in the last 72 hours.    Recent Labs     10/16/24  0743   INR 1.18*       Urine Cultures: No results found for: \"LABURIN\"  Blood Cultures: No results found for: \"BC\"  No results found for: \"BLOODCULT2\"  Organism: No results found for: \"ORG\"      Melva Patten MD

## 2024-10-18 NOTE — FLOWSHEET NOTE
10/18/24 1006 10/18/24 1317   Vital Signs   BP (!) 107/45 (!) 120/45   Temp 98.2 °F (36.8 °C) 98 °F (36.7 °C)   Pulse 60 62   Respirations 18 18   Weight - Scale 66.8 kg (147 lb 4.3 oz) 65.1 kg (143 lb 8.3 oz)   Weight Method Standing scale Standing scale   Dry Weight 66 kg (145 lb 8.1 oz)  --    Post-Hemodialysis Assessment   NET Removed (ml)  --  1500     Treatment time: 3 hours  Net UF: 1500 ml    Pre weight: 66.8 kg   Post weight: 65.1 kg  EDW: 66 kg    Access used: TEE AVF  Access function: good with  ml/min    Medications or blood products given: albumin 25gX1, retacrit 6K    Regular outpatient schedule: [facility]    Summary of response to treatment: pt remained stable t/o tx     Copy of dialysis treatment record placed in chart, to be scanned into EMR.

## 2024-10-18 NOTE — PLAN OF CARE
Problem: Safety - Adult  Goal: Free from fall injury  Outcome: Progressing  Flowsheets (Taken 10/18/2024 0315 by Vanna Turner, RN)  Free From Fall Injury: Instruct family/caregiver on patient safety     Problem: Chronic Conditions and Co-morbidities  Goal: Patient's chronic conditions and co-morbidity symptoms are monitored and maintained or improved  Outcome: Progressing  Flowsheets (Taken 10/18/2024 0315 by Vanna Turner, RN)  Care Plan - Patient's Chronic Conditions and Co-Morbidity Symptoms are Monitored and Maintained or Improved:   Monitor and assess patient's chronic conditions and comorbid symptoms for stability, deterioration, or improvement   Collaborate with multidisciplinary team to address chronic and comorbid conditions and prevent exacerbation or deterioration     Problem: Pain  Goal: Verbalizes/displays adequate comfort level or baseline comfort level  Outcome: Progressing  Flowsheets (Taken 10/18/2024 0315 by Vanna Turner, RN)  Verbalizes/displays adequate comfort level or baseline comfort level:   Encourage patient to monitor pain and request assistance   Administer analgesics based on type and severity of pain and evaluate response   Assess pain using appropriate pain scale

## 2024-10-18 NOTE — PLAN OF CARE
Problem: Skin/Tissue Integrity  Goal: Absence of new skin breakdown  Description: 1.  Monitor for areas of redness and/or skin breakdown  2.  Assess vascular access sites hourly  3.  Every 4-6 hours minimum:  Change oxygen saturation probe site  4.  Every 4-6 hours:  If on nasal continuous positive airway pressure, respiratory therapy assess nares and determine need for appliance change or resting period.  Outcome: Progressing  Note: Patient is able to turn self. Patient encouraged and pillow support provided      Problem: Safety - Adult  Goal: Free from fall injury  Outcome: Progressing  Flowsheets (Taken 10/18/2024 0315)  Free From Fall Injury: Instruct family/caregiver on patient safety  Note: Pt is a Fall Risk. See Manuel Fall Risk Score. Pt bed in low position and side rails up. Call light and belongings in reach. Pt encouraged to call for assistance. Will continue with hourly rounds for PO intake, pain needs, toileting, and repositioning as needed.        Problem: Chronic Conditions and Co-morbidities  Goal: Patient's chronic conditions and co-morbidity symptoms are monitored and maintained or improved  Outcome: Progressing  Flowsheets (Taken 10/18/2024 0315)  Care Plan - Patient's Chronic Conditions and Co-Morbidity Symptoms are Monitored and Maintained or Improved:   Monitor and assess patient's chronic conditions and comorbid symptoms for stability, deterioration, or improvement   Collaborate with multidisciplinary team to address chronic and comorbid conditions and prevent exacerbation or deterioration     Problem: Pain  Goal: Verbalizes/displays adequate comfort level or baseline comfort level  Outcome: Progressing  Flowsheets (Taken 10/18/2024 0315)  Verbalizes/displays adequate comfort level or baseline comfort level:   Encourage patient to monitor pain and request assistance   Administer analgesics based on type and severity of pain and evaluate response   Assess pain using appropriate pain scale

## 2024-10-19 ENCOUNTER — APPOINTMENT (OUTPATIENT)
Dept: ULTRASOUND IMAGING | Age: 74
DRG: 299 | End: 2024-10-19
Payer: MEDICARE

## 2024-10-19 ENCOUNTER — APPOINTMENT (OUTPATIENT)
Dept: CT IMAGING | Age: 74
DRG: 299 | End: 2024-10-19
Payer: MEDICARE

## 2024-10-19 PROBLEM — I95.9 HYPOTENSION: Status: ACTIVE | Noted: 2024-03-25

## 2024-10-19 LAB
ABO + RH BLD: NORMAL
ALBUMIN SERPL-MCNC: 3 G/DL (ref 3.4–5)
ANION GAP SERPL CALCULATED.3IONS-SCNC: 12 MMOL/L (ref 3–16)
ANION GAP SERPL CALCULATED.3IONS-SCNC: 14 MMOL/L (ref 3–16)
ANTI-XA UNFRAC HEPARIN: <0.1 IU/ML (ref 0.3–0.7)
APTT BLD: 63.2 SEC (ref 22.1–36.4)
BASOPHILS # BLD: 0 K/UL (ref 0–0.2)
BASOPHILS NFR BLD: 0.3 %
BASOPHILS NFR BLD: 0.5 %
BASOPHILS NFR BLD: 0.5 %
BLD GP AB SCN SERPL QL: NORMAL
BLOOD BANK DISPENSE STATUS: NORMAL
BLOOD BANK PRODUCT CODE: NORMAL
BPU ID: NORMAL
BUN SERPL-MCNC: 17 MG/DL (ref 7–20)
BUN SERPL-MCNC: 21 MG/DL (ref 7–20)
CA-I BLD-SCNC: 1.15 MMOL/L (ref 1.12–1.32)
CALCIUM SERPL-MCNC: 9 MG/DL (ref 8.3–10.6)
CALCIUM SERPL-MCNC: 9.7 MG/DL (ref 8.3–10.6)
CHLORIDE SERPL-SCNC: 96 MMOL/L (ref 99–110)
CHLORIDE SERPL-SCNC: 97 MMOL/L (ref 99–110)
CO2 SERPL-SCNC: 22 MMOL/L (ref 21–32)
CO2 SERPL-SCNC: 25 MMOL/L (ref 21–32)
CREAT SERPL-MCNC: 4 MG/DL (ref 0.6–1.2)
CREAT SERPL-MCNC: 4.5 MG/DL (ref 0.6–1.2)
D-DIMER QUANTITATIVE: 0.51 UG/ML FEU (ref 0–0.6)
DEPRECATED RDW RBC AUTO: 16.1 % (ref 12.4–15.4)
DEPRECATED RDW RBC AUTO: 16.6 % (ref 12.4–15.4)
DEPRECATED RDW RBC AUTO: 16.7 % (ref 12.4–15.4)
DEPRECATED RDW RBC AUTO: 16.9 % (ref 12.4–15.4)
DESCRIPTION BLOOD BANK: NORMAL
EOSINOPHIL # BLD: 0.1 K/UL (ref 0–0.6)
EOSINOPHIL NFR BLD: 0.8 %
EOSINOPHIL NFR BLD: 1.9 %
EOSINOPHIL NFR BLD: 3.5 %
GFR SERPLBLD CREATININE-BSD FMLA CKD-EPI: 10 ML/MIN/{1.73_M2}
GFR SERPLBLD CREATININE-BSD FMLA CKD-EPI: 11 ML/MIN/{1.73_M2}
GLUCOSE BLD-MCNC: 102 MG/DL (ref 70–99)
GLUCOSE BLD-MCNC: 89 MG/DL (ref 70–99)
GLUCOSE BLD-MCNC: 95 MG/DL (ref 70–99)
GLUCOSE SERPL-MCNC: 103 MG/DL (ref 70–99)
GLUCOSE SERPL-MCNC: 84 MG/DL (ref 70–99)
HBV SURFACE AB SERPL IA-ACNC: <3.5 MIU/ML
HBV SURFACE AG SERPL QL IA: NORMAL
HCT VFR BLD AUTO: 18.4 % (ref 36–48)
HCT VFR BLD AUTO: 19 % (ref 36–48)
HCT VFR BLD AUTO: 22.1 % (ref 36–48)
HCT VFR BLD AUTO: 29.6 % (ref 36–48)
HGB BLD-MCNC: 5.9 G/DL (ref 12–16)
HGB BLD-MCNC: 6.1 G/DL (ref 12–16)
HGB BLD-MCNC: 7.2 G/DL (ref 12–16)
HGB BLD-MCNC: 9.8 G/DL (ref 12–16)
LYMPHOCYTES # BLD: 0.3 K/UL (ref 1–5.1)
LYMPHOCYTES # BLD: 0.5 K/UL (ref 1–5.1)
LYMPHOCYTES # BLD: 0.6 K/UL (ref 1–5.1)
LYMPHOCYTES NFR BLD: 5.7 %
LYMPHOCYTES NFR BLD: 7.9 %
LYMPHOCYTES NFR BLD: 8.9 %
MCH RBC QN AUTO: 31.5 PG (ref 26–34)
MCH RBC QN AUTO: 31.9 PG (ref 26–34)
MCH RBC QN AUTO: 32.1 PG (ref 26–34)
MCH RBC QN AUTO: 32.1 PG (ref 26–34)
MCHC RBC AUTO-ENTMCNC: 31.8 G/DL (ref 31–36)
MCHC RBC AUTO-ENTMCNC: 32.1 G/DL (ref 31–36)
MCHC RBC AUTO-ENTMCNC: 32.6 G/DL (ref 31–36)
MCHC RBC AUTO-ENTMCNC: 33.2 G/DL (ref 31–36)
MCV RBC AUTO: 100.9 FL (ref 80–100)
MCV RBC AUTO: 95 FL (ref 80–100)
MCV RBC AUTO: 98.3 FL (ref 80–100)
MCV RBC AUTO: 99.4 FL (ref 80–100)
MONOCYTES # BLD: 0.3 K/UL (ref 0–1.3)
MONOCYTES # BLD: 0.6 K/UL (ref 0–1.3)
MONOCYTES # BLD: 1.2 K/UL (ref 0–1.3)
MONOCYTES NFR BLD: 10.7 %
MONOCYTES NFR BLD: 9.3 %
MONOCYTES NFR BLD: 9.4 %
NEUTROPHILS # BLD: 2.5 K/UL (ref 1.7–7.7)
NEUTROPHILS # BLD: 5 K/UL (ref 1.7–7.7)
NEUTROPHILS # BLD: 9 K/UL (ref 1.7–7.7)
NEUTROPHILS NFR BLD: 77.7 %
NEUTROPHILS NFR BLD: 80.4 %
NEUTROPHILS NFR BLD: 82.5 %
PERFORMED ON: ABNORMAL
PERFORMED ON: NORMAL
PERFORMED ON: NORMAL
PH VENOUS: 7.32 (ref 7.35–7.45)
PHOSPHATE SERPL-MCNC: 2.4 MG/DL (ref 2.5–4.9)
PLATELET # BLD AUTO: 143 K/UL (ref 135–450)
PLATELET # BLD AUTO: 156 K/UL (ref 135–450)
PLATELET # BLD AUTO: 166 K/UL (ref 135–450)
PLATELET # BLD AUTO: 89 K/UL (ref 135–450)
PMV BLD AUTO: 10.3 FL (ref 5–10.5)
PMV BLD AUTO: 10.6 FL (ref 5–10.5)
PMV BLD AUTO: 10.8 FL (ref 5–10.5)
PMV BLD AUTO: 10.8 FL (ref 5–10.5)
POTASSIUM SERPL-SCNC: 4.6 MMOL/L (ref 3.5–5.1)
POTASSIUM SERPL-SCNC: 4.7 MMOL/L (ref 3.5–5.1)
RBC # BLD AUTO: 1.85 M/UL (ref 4–5.2)
RBC # BLD AUTO: 1.89 M/UL (ref 4–5.2)
RBC # BLD AUTO: 2.25 M/UL (ref 4–5.2)
RBC # BLD AUTO: 3.12 M/UL (ref 4–5.2)
REASON FOR REJECTION: NORMAL
REJECTED TEST: NORMAL
SODIUM SERPL-SCNC: 132 MMOL/L (ref 136–145)
SODIUM SERPL-SCNC: 134 MMOL/L (ref 136–145)
WBC # BLD AUTO: 10.9 K/UL (ref 4–11)
WBC # BLD AUTO: 3.3 K/UL (ref 4–11)
WBC # BLD AUTO: 6.3 K/UL (ref 4–11)
WBC # BLD AUTO: 6.8 K/UL (ref 4–11)

## 2024-10-19 PROCEDURE — 86850 RBC ANTIBODY SCREEN: CPT

## 2024-10-19 PROCEDURE — 85730 THROMBOPLASTIN TIME PARTIAL: CPT

## 2024-10-19 PROCEDURE — 85379 FIBRIN DEGRADATION QUANT: CPT

## 2024-10-19 PROCEDURE — 30233N1 TRANSFUSION OF NONAUTOLOGOUS RED BLOOD CELLS INTO PERIPHERAL VEIN, PERCUTANEOUS APPROACH: ICD-10-PCS

## 2024-10-19 PROCEDURE — 6360000002 HC RX W HCPCS

## 2024-10-19 PROCEDURE — 86900 BLOOD TYPING SEROLOGIC ABO: CPT

## 2024-10-19 PROCEDURE — 99232 SBSQ HOSP IP/OBS MODERATE 35: CPT | Performed by: HOSPITALIST

## 2024-10-19 PROCEDURE — 6370000000 HC RX 637 (ALT 250 FOR IP)

## 2024-10-19 PROCEDURE — 6360000004 HC RX CONTRAST MEDICATION

## 2024-10-19 PROCEDURE — 80069 RENAL FUNCTION PANEL: CPT

## 2024-10-19 PROCEDURE — 86920 COMPATIBILITY TEST SPIN: CPT

## 2024-10-19 PROCEDURE — 85027 COMPLETE CBC AUTOMATED: CPT

## 2024-10-19 PROCEDURE — 99233 SBSQ HOSP IP/OBS HIGH 50: CPT | Performed by: SURGERY

## 2024-10-19 PROCEDURE — 06HY33Z INSERTION OF INFUSION DEVICE INTO LOWER VEIN, PERCUTANEOUS APPROACH: ICD-10-PCS

## 2024-10-19 PROCEDURE — 86901 BLOOD TYPING SEROLOGIC RH(D): CPT

## 2024-10-19 PROCEDURE — 76999 ECHO EXAMINATION PROCEDURE: CPT

## 2024-10-19 PROCEDURE — 2580000003 HC RX 258: Performed by: INTERNAL MEDICINE

## 2024-10-19 PROCEDURE — 82330 ASSAY OF CALCIUM: CPT

## 2024-10-19 PROCEDURE — 2500000003 HC RX 250 WO HCPCS

## 2024-10-19 PROCEDURE — 2580000003 HC RX 258

## 2024-10-19 PROCEDURE — P9017 PLASMA 1 DONOR FRZ W/IN 8 HR: HCPCS

## 2024-10-19 PROCEDURE — 36415 COLL VENOUS BLD VENIPUNCTURE: CPT

## 2024-10-19 PROCEDURE — 04HY32Z INSERTION OF MONITORING DEVICE INTO LOWER ARTERY, PERCUTANEOUS APPROACH: ICD-10-PCS

## 2024-10-19 PROCEDURE — 6370000000 HC RX 637 (ALT 250 FOR IP): Performed by: INTERNAL MEDICINE

## 2024-10-19 PROCEDURE — 71275 CT ANGIOGRAPHY CHEST: CPT

## 2024-10-19 PROCEDURE — 2000000000 HC ICU R&B

## 2024-10-19 PROCEDURE — 86923 COMPATIBILITY TEST ELECTRIC: CPT

## 2024-10-19 PROCEDURE — 85025 COMPLETE CBC W/AUTO DIFF WBC: CPT

## 2024-10-19 PROCEDURE — 36430 TRANSFUSION BLD/BLD COMPNT: CPT

## 2024-10-19 PROCEDURE — 30233N1 TRANSFUSION OF NONAUTOLOGOUS RED BLOOD CELLS INTO PERIPHERAL VEIN, PERCUTANEOUS APPROACH: ICD-10-PCS | Performed by: INTERNAL MEDICINE

## 2024-10-19 PROCEDURE — P9016 RBC LEUKOCYTES REDUCED: HCPCS

## 2024-10-19 PROCEDURE — 85520 HEPARIN ASSAY: CPT

## 2024-10-19 RX ORDER — SODIUM CHLORIDE 9 MG/ML
INJECTION, SOLUTION INTRAVENOUS PRN
Status: DISCONTINUED | OUTPATIENT
Start: 2024-10-19 | End: 2024-10-23 | Stop reason: HOSPADM

## 2024-10-19 RX ORDER — IOPAMIDOL 755 MG/ML
75 INJECTION, SOLUTION INTRAVASCULAR
Status: COMPLETED | OUTPATIENT
Start: 2024-10-19 | End: 2024-10-19

## 2024-10-19 RX ORDER — NOREPINEPHRINE BITARTRATE 0.06 MG/ML
1-100 INJECTION, SOLUTION INTRAVENOUS CONTINUOUS
Status: DISCONTINUED | OUTPATIENT
Start: 2024-10-19 | End: 2024-10-21

## 2024-10-19 RX ORDER — 0.9 % SODIUM CHLORIDE 0.9 %
1000 INTRAVENOUS SOLUTION INTRAVENOUS ONCE
Status: DISCONTINUED | OUTPATIENT
Start: 2024-10-19 | End: 2024-10-19

## 2024-10-19 RX ORDER — GINSENG 100 MG
CAPSULE ORAL 3 TIMES DAILY
Status: DISCONTINUED | OUTPATIENT
Start: 2024-10-19 | End: 2024-10-23 | Stop reason: HOSPADM

## 2024-10-19 RX ADMIN — WATER 1000 MG: 1 INJECTION INTRAMUSCULAR; INTRAVENOUS; SUBCUTANEOUS at 22:07

## 2024-10-19 RX ADMIN — HYDROMORPHONE HYDROCHLORIDE 2 MG: 2 TABLET ORAL at 16:01

## 2024-10-19 RX ADMIN — IOPAMIDOL 75 ML: 755 INJECTION, SOLUTION INTRAVENOUS at 15:33

## 2024-10-19 RX ADMIN — BACITRACIN: 500 OINTMENT TOPICAL at 22:05

## 2024-10-19 RX ADMIN — SODIUM CHLORIDE, PRESERVATIVE FREE 10 ML: 5 INJECTION INTRAVENOUS at 19:53

## 2024-10-19 RX ADMIN — METOPROLOL SUCCINATE 50 MG: 50 TABLET, EXTENDED RELEASE ORAL at 09:17

## 2024-10-19 RX ADMIN — CALCIUM ACETATE 667 MG: 667 CAPSULE ORAL at 22:05

## 2024-10-19 RX ADMIN — PANTOPRAZOLE SODIUM 40 MG: 40 TABLET, DELAYED RELEASE ORAL at 05:40

## 2024-10-19 RX ADMIN — CLOPIDOGREL BISULFATE 75 MG: 75 TABLET ORAL at 09:17

## 2024-10-19 RX ADMIN — PROTAMINE SULFATE 25 MG: 10 INJECTION, SOLUTION INTRAVENOUS at 19:22

## 2024-10-19 RX ADMIN — CALCITRIOL CAPSULES 0.25 MCG 0.5 MCG: 0.25 CAPSULE ORAL at 09:17

## 2024-10-19 RX ADMIN — ATORVASTATIN CALCIUM 80 MG: 80 TABLET, FILM COATED ORAL at 22:05

## 2024-10-19 RX ADMIN — NOREPINEPHRINE BITARTRATE 5 MCG/MIN: 0.06 INJECTION, SOLUTION INTRAVENOUS at 19:51

## 2024-10-19 RX ADMIN — ASPIRIN 81 MG: 81 TABLET, COATED ORAL at 09:17

## 2024-10-19 RX ADMIN — HYDROMORPHONE HYDROCHLORIDE 2 MG: 2 TABLET ORAL at 11:58

## 2024-10-19 RX ADMIN — ALLOPURINOL 200 MG: 100 TABLET ORAL at 09:17

## 2024-10-19 RX ADMIN — SODIUM CHLORIDE, PRESERVATIVE FREE 10 ML: 5 INJECTION INTRAVENOUS at 09:16

## 2024-10-19 ASSESSMENT — PAIN SCALES - GENERAL
PAINLEVEL_OUTOF10: 9
PAINLEVEL_OUTOF10: 7
PAINLEVEL_OUTOF10: 9
PAINLEVEL_OUTOF10: 2
PAINLEVEL_OUTOF10: 0

## 2024-10-19 ASSESSMENT — PAIN - FUNCTIONAL ASSESSMENT
PAIN_FUNCTIONAL_ASSESSMENT: ACTIVITIES ARE NOT PREVENTED

## 2024-10-19 ASSESSMENT — PAIN DESCRIPTION - ORIENTATION
ORIENTATION: RIGHT

## 2024-10-19 ASSESSMENT — PAIN DESCRIPTION - DESCRIPTORS
DESCRIPTORS: ACHING;DISCOMFORT;BURNING
DESCRIPTORS: ACHING;DISCOMFORT;PRESSURE
DESCRIPTORS: ACHING;PRESSURE

## 2024-10-19 ASSESSMENT — PAIN DESCRIPTION - LOCATION
LOCATION: CHEST
LOCATION: CHEST
LOCATION: FOOT

## 2024-10-19 ASSESSMENT — PAIN DESCRIPTION - ONSET: ONSET: SUDDEN

## 2024-10-19 ASSESSMENT — PAIN DESCRIPTION - FREQUENCY: FREQUENCY: INTERMITTENT

## 2024-10-19 ASSESSMENT — PAIN DESCRIPTION - PAIN TYPE: TYPE: ACUTE PAIN

## 2024-10-19 NOTE — PROGRESS NOTES
IR called regarding right breast hematoma. CTA personally reviewed.    Multiple areas of hyperattenuation are noted in the right breast collection reflecting multiple sites of bleeding. These are not amenable to angiographic treatment and would recommend reversal of her anticoagulation, continued conservative support and surgical hematoma evacuation if necessary.     Given multiple sites of bleeding in the setting of anticoagulation, and with an anatomic AV fistula on the right, this may be arterial and/or venous.    Pankaj Nesbitt MD

## 2024-10-19 NOTE — PROGRESS NOTES
Nephrology Progress Note                                                                                                                                                                                                                                                                                                                                                               Office : [phone]     Fax :[phone]    Patient's Name: Valery Ramirez    Reason for Consult:  ESRD on HD  Requesting Physician:  Tori Mora MD (Inactive)  Chief Complaint:    Chief Complaint   Patient presents with    Diarrhea     Pt reports bloody diarrhea since Wednesday.     Nausea    Emesis       Assessment/Plan     # ESRD on HD   - Dialyzes on a MWF schedule  - Renally dose meds for ESRD  - Monitor renal labs     # HTN  - BP's soft. Hold CCB  - Monitor     # Anemia of chronic disease  - Hgb trending down. Add UMBERTO with HD   - Monitor     # Acid- base/ Electrolyte imbalance   - Lytes stable  - Monitor     # MBD management  - Cont home Calcitriol 0.5 mcg daily   - Phoslo with meals   - Low phos diet     # DM2   - Insulin management per primary     # CHF  - Cont vol management with HD   - Attempt UF as able - limited thus far by BP    # Worsening abdominal pain - Resolved   - Felt to be viral in etiology  - Symptomatic management for now     # R lower limb ischemia   - S/p bilateral runoff angiogram 9/27 with extensive calcifications  - Bilateral arterial - complete occlusion with no blood flow to the right   - Vascular surgery consulted - started on Heparin gtt   - Plan for RLE arteriogram on Monday 10/21         History of Present Ilness:    Valery Ramirez is a 74 y.o. female with a PMH of ESRD on HD, HTN, HLD, COPD, CHF, gout, DM2, h/o GI bleed, who presents to the hospital for one week of increased abdominal pain and bloody diarrhea. Patient also noted over the last week that her right foot and toes have become  TRACE*   BLOODU TRACE-INTACT*   PHUR 5.0   PROTEINU >=300*   UROBILINOGEN 0.2   NITRU Negative   LEUKOCYTESUR MODERATE*   URINETYPE NotGiven      Urine Microscopic:   Recent Labs     10/17/24  0630   BACTERIA 3+*   WBCUA *   RBCUA 5-10*     Urine Culture: No results for input(s): \"LABURIN\" in the last 72 hours.  Urine Chemistry: No results for input(s): \"CLUR\", \"LABCREA\", \"PROTEINUR\", \"NAUR\" in the last 72 hours.      IMAGING:  CT HEAD WO CONTRAST   Final Result      1. No acute intracranial abnormality.   2. Old left thalamic lacunar infarct.      Electronically signed by Rios Lucas      XR CHEST PORTABLE   Final Result   1. Cardiomegaly and interstitial groundglass density. Cardiogenic congestive   changes.      Electronically signed by Kal De Jesus      Vascular duplex lower extremity arteries bilateral   Final Result      XR FOOT RIGHT (MIN 3 VIEWS)   Final Result      No acute fracture.      Electronically signed by Jesse Ricci DO      CTA ABDOMINAL AORTA W BILAT RUNOFF W CONTRAST   Final Result   1.  Extensive calcific atherosclerotic disease of both lower extremities with   severe stenosis of the right common femoral artery and bilateral superficial   femoral arteries. Additional evaluation of arterial structures is limited due to   phase of contrast.   2.  Occlusion of the right renal artery and severe stenosis of the left renal   artery. Atrophy of the right kidney is likely secondary to ischemia.   3.  Tortuous veins of the retroperitoneum involving the proximal duodenal wall.   4.  No acute nonvascular findings.   5.  Cirrhosis with splenomegaly and secondary signs of portal hypertension.      Electronically signed by Bhavin Miller      XR CHEST (2 VW)   Final Result   1. Cardiomegaly otherwise no acute cardiopulmonary abnormality.      Electronically signed by Duarte Smith MD          Medical Decision Making:  The following items were considered in medical decision making:  Discussion

## 2024-10-19 NOTE — PROGRESS NOTES
Hospital Medicine Progress Note      Date of Admission: 10/14/2024  Hospital Day: 6    Chief Admission Complaint: Right lower extremity pain and abdominal pain     Subjective: Patient seen and examined at bedside today.  Complains of pain in her right breast with swelling  Hemodynamically stable  2 LNC while asleep  Afebrile  Presenting Admission History:       This is a 74-year-old female with medical history significant for ESRD on hemodialysis MWF, type II DM, essential hypertension, hyperlipidemia, gout, dysphagia and voice disorder, COPD, HFpEF, severe peripheral artery disease presented with complaints of nausea, vomiting, diarrhea with abdominal pain as well as worsening right lower extremity pain over the past week.  JERRICA negative for blood.  She was admitted for concerns of right lower extremity ischemia and gastroenteritis.    Assessment/Plan:      Pain and swelling in the right chest wall  Differentials at this time include cellulitis, abscess or hematoma  Denies any trauma.  Will obtain CT chest with contrast and ultrasound soft tissue  Continue ceftriaxone      Right lower extremity ischemia  CT abdominal aorta with bilateral runoff with contrast 10/14 1.  Extensive calcific atherosclerotic disease of both lower extremities with severe stenosis of the right common femoral artery and bilateral superficial femoral arteries. Additional evaluation of arterial structures is limited due to phase of contrast. 2.  Occlusion of the right renal artery and severe stenosis of the left renal artery. Atrophy of the right kidney is likely secondary to ischemia. 3.  Tortuous veins of the retroperitoneum involving the proximal duodenal wall.  X-ray of the right foot negative for any acute fracture.  Bilateral lower extremity arterial Doppler done 10/15 preliminary concerning for complete occlusion with no blood flow to the right.  Vascular surgery, started on IV heparin 10/16, with a plan for angiogram on Monday 10/21  hemodialysis  Continue Toprol-XL 50 mg twice daily  Amlodipine held due to low blood pressure    Leukopenia and thrombocytopenia, review of records reveal leukopenia and thrombocytopenia in the past.  Will continue to monitor.  Liver cirrhosis with splenomegaly and secondary signs of portal hypertension from [facility]      Physical Exam Performed:      General appearance:  No apparent distress  Respiratory:  Normal respiratory effort.   Cardiovascular:  Regular rate and rhythm.  Abdomen:  Soft, epigastric tenderness without guarding or rigidity, non-distended.  Musculoskeletal:  No edema, difficult to palpate bilateral lower extremity pulses.  Active AV fistula in the right forearm and old nonfunctioning AV fistula in the left upper arm.  Warmth and tenderness in the right chest wall without erythema or discoloration.  Neurologic:  Non-focal  Psychiatric:  Alert and oriented    /61   Pulse 61   Temp 97.6 °F (36.4 °C) (Oral)   Resp 18   Ht 1.626 m (5' 4\")   Wt 65.3 kg (143 lb 15.4 oz)   SpO2 93%   BMI 24.71 kg/m²     Diet: ADULT DIET; Regular; 3 carb choices (45 gm/meal); Low Fat/Low Chol/High Fiber/2 gm Na; Low Phosphorus (Less than 1000 mg)  ADULT ORAL NUTRITION SUPPLEMENT; Lunch; Renal Oral Supplement  DVT Prophylaxis: []PPx LMWH  []SQ Heparin  []IPC/SCDs  []Eliquis  []Xarelto  []Coumadin  [x]Other -IV heparin    Code status: Full Code  PT/OT Eval Status:   []NOT yet ordered  []Ordered and Pending   [x]Seen with Recommendations for:  []Home independently  [x]Home w/ assist  []HHC  []SNF  []Acute Rehab    Anticipated Discharge Day/Date:  2 days    Anticipated Discharge Location: [x]Home  []HHC  [x]SNF  []Acute Rehab  []ECF  []LTAC  []Hospice  []Other -      Consults:      IP CONSULT TO VASCULAR SURGERY  IP CONSULT TO NEPHROLOGY  IP CONSULT TO PHARMACY  IP CONSULT TO DIETITIAN

## 2024-10-19 NOTE — PLAN OF CARE
Problem: Chronic Conditions and Co-morbidities  Goal: Patient's chronic conditions and co-morbidity symptoms are monitored and maintained or improved  Care Plan - Patient's Chronic Conditions and Co-Morbidity Symptoms are Monitored and Maintained or Improved:   Monitor and assess patient's chronic conditions and comorbid symptoms for stability, deterioration, or improvement   Collaborate with multidisciplinary team to address chronic and comorbid conditions and prevent exacerbation or deterioration   Update acute care plan with appropriate goals if chronic or comorbid symptoms are exacerbated and prevent overall improvement and discharge     Problem: Chronic Conditions and Co-morbidities  Goal: Patient's chronic conditions and co-morbidity symptoms are monitored and maintained or improved  10/19/2024 0950 by Frances Mattson RN  Outcome: Progressing  Care Plan - Patient's Chronic Conditions and Co-Morbidity Symptoms are Monitored and Maintained or Improved:   Monitor and assess patient's chronic conditions and comorbid symptoms for stability, deterioration, or improvement   Collaborate with multidisciplinary team to address chronic and comorbid conditions and prevent exacerbation or deterioration   Update acute care plan with appropriate goals if chronic or comorbid symptoms are exacerbated and prevent overall improvement and discharge     Problem: Pain  Goal: Verbalizes/displays adequate comfort level or baseline comfort level  Problem: Skin/Tissue Integrity  Goal: Absence of new skin breakdown  Description: 1.  Monitor for areas of redness and/or skin breakdown  2.  Assess vascular access sites hourly  3.  Every 4-6 hours minimum:  Change oxygen saturation probe site  4.  Every 4-6 hours:  If on nasal continuous positive airway pressure, respiratory therapy assess nares and determine need for appliance change or resting period.  Outcome: Progressing     Problem: Safety - Adult  Goal: Free from fall injury  Free

## 2024-10-19 NOTE — PROGRESS NOTES
Vascular Surgery Daily Progress Note  Patient: Valery Ramirez    CC: R lower limb ischemia     Subjective :  NAEO. Pain is controlled this morning. Patient is tolerating diet.     ROS: A 14 point review of systems was conducted, significant findings as noted above. All other systems negative.    Objective :     Infusions:   heparin (PORCINE) Infusion 18 Units/kg/hr (10/19/24 0930)    dextrose      sodium chloride          I/O:I/O last 3 completed shifts:  In: 770 [P.O.:270]  Out: 2000            Wt Readings from Last 1 Encounters:   10/19/24 65.3 kg (143 lb 15.4 oz)       Exam:BP (!) 122/56   Pulse 88   Temp 98 °F (36.7 °C) (Oral)   Resp 16   Ht 1.626 m (5' 4\")   Wt 65.3 kg (143 lb 15.4 oz)   SpO2 94%   BMI 24.71 kg/m²     General appearance: alert, no acute distress, ill-appearing  Eyes: PERRL, no scleral icterus  Neck: trachea midline  Chest/Lungs: normal effort on RA  Cardiovascular: RRR  Abdomen: soft, obese, tender to palpation throughout, non-distended, no guarding/rigidity  Skin: warm and dry, no rashes     Pulses     F P PT DP   R + -/+ - -/+   L + -/+ -/+ -/+    +, -/+ ,-/-        LABS:   Recent Labs     10/18/24  0619 10/19/24  0721   WBC 3.7* 3.3*   HGB 8.0* 7.2*   HCT 24.7* 22.1*   MCV 98.7 98.3   PLT 98* 89*        Recent Labs     10/17/24  0523 10/18/24  0619 10/19/24  0721   * 134* 134*   K 4.7 4.3 4.6    100 97*   CO2 27 27 25   PHOS 2.7  --   --    BUN 12 24* 17   CREATININE 3.3* 5.4* 4.0*        No results for input(s): \"AST\", \"ALT\", \"BILIDIR\", \"BILITOT\", \"ALKPHOS\" in the last 72 hours.    Invalid input(s): \"ALB\"       No results for input(s): \"LIPASE\", \"AMYLASE\" in the last 72 hours.       Recent Labs     10/18/24  0618 10/19/24  0721   APTT 75.4* 63.2*      No results for input(s): \"CKTOTAL\", \"CKMB\", \"CKMBINDEX\", \"TROPONINI\" in the last 72 hours.    ASSESSMENT/PLAN: Pt. is a 74 y.o. female Hx of CHF, COPD, dysphagia and voice disorder, HTN, HLD, ESRD on HD MWF, gout,  T2DM, hx of GI bleed, who presents to the hospital for one week of increased abdominal pain and bloody diarrhea. The patient also noted that for the last week her right foot and toes have become more red in color. Bilateral runoff angiogram on 9/27/2024 with TASC2 D lesions bilaterally with extensive calcifications. CTA abdomen and aorta with runoff showed Extensive calcific atherosclerotic disease of both lower extremities with severe stenosis of the right common femoral artery and bilateral superficial femoral arteries. Occlusion of the right renal artery and severe stenosis of the left renal artery. Atrophy of the right kidney is likely secondary to ischemia. Vascular surgery consulted at this time.     - No acute surgical intervention, likely chronic progression of PAD  - Will reach out with nephro for dialysis today vs tomorrow in preparation for angiogram on Monday.  - Plan for RLE arteriogram on Monday  - Continue AC with heparin gtt  - Okay for a diet at this time  - Recommend q4 neurovascular checks  - med rec: patient likely no longer taking plavix, from a vascular standpoint will likely only need ASA and xarelto, or ASA and Plavix on discharge, rest of med rec per primary  - continue treatment of suspected UTI per primary   - Will continue to follow    Thi Delcid MD  PGY1, General Surgery  10/19/24  11:05 AM  [phone]

## 2024-10-19 NOTE — PROGRESS NOTES
Nephrology Progress Note                                                                                                                                                                                                                                                                                                                                                               Office : [phone]     Fax :[phone]    Patient's Name: Valery Ramirez    Reason for Consult:  ESRD on HD  Requesting Physician:  Tori Mora MD (Inactive)  Chief Complaint:    Chief Complaint   Patient presents with    Diarrhea     Pt reports bloody diarrhea since Wednesday.     Nausea    Emesis       Assessment/Plan     # ESRD on HD   - We will dialyze her in am on surgery team request   - Dialyzes on a MWF schedule  - Renally dose meds for ESRD  - Monitor renal labs     # R lower limb ischemia   - S/p bilateral runoff angiogram 9/27 with extensive calcifications  - Bilateral arterial - complete occlusion with no blood flow to the right   - Vascular surgery consulted - started on Heparin gtt   - Plan for RLE arteriogram on Monday 10/21     # HTN  - BP's soft. Hold CCB  - Monitor     # Anemia of chronic disease  - Hgb trending down. Add UMBERTO with HD   - Monitor     # Acid- base/ Electrolyte imbalance   - Lytes stable  - Monitor     # MBD management  - Cont home Calcitriol 0.5 mcg daily   - Phoslo with meals   - Low phos diet     # DM2   - Insulin management per primary     # CHF  - Cont vol management with HD   - Attempt UF as able - limited thus far by BP    # Worsening abdominal pain - Resolved   - Felt to be viral in etiology  - Symptomatic management for now             History of Present Ilness:    Valery Ramirez is a 74 y.o. female with a PMH of ESRD on HD, HTN, HLD, COPD, CHF, gout, DM2, h/o GI bleed, who presents to the hospital for one week of increased abdominal pain and bloody diarrhea. Patient also noted over

## 2024-10-19 NOTE — SIGNIFICANT EVENT
Name: Valery Ramirez            Admitted: 10/14/2024     Significant event: Hypotension    Pt w/ a hx of medical history significant for ESRD on hemodialysis MWF, type II DM, essential hypertension, hyperlipidemia, gout, dysphagia and voice disorder, COPD, HFpEF, severe peripheral artery disease. She was admitted for concerns of right lower extremity ischemia and gastroenteritis. ICU team was called to bedside because patient was possibly hypotensive. BP cuff was not able to get a reading, so pressure was estimated using doppler, with an estimated SBP in the 70's. Patient has been on heparin gtt since admission in preparation of surgical intervention for RLE vascular disease. She developed a hematoma on her left chest about one hour prior to this rapid being called. Her recent hgb was 5.9. 2 untis of pRBCs had already been ordered but had not yet started transfusing.    1L of NS bolus was ordered. Peripheral IV access was obtained in locations on her left arm. The right arm has vascular access for HD. Attempts were made to obtain BP by cuff on left upper and lower extremities, but they were unsuccessful. The decision was made to transfer her to the ICU. Surgery will place an a-line in her left femoral vein. 2 more units pRBCs, 1 unit FFP, and ionized Ca were ordered.    BP (!) 104/48   Pulse 54   Temp 97.9 °F (36.6 °C) (Oral)   Resp 18   Ht 1.626 m (5' 4\")   Wt 65.3 kg (143 lb 15.4 oz)   SpO2 96%   BMI 24.71 kg/m²   General appearance: NAD, ill-appearing   Lungs: CTA  Heart: RRR  Abdomen: Soft, no TGR   Neurologic: AOx2, pt knew the month and why she was at the hospital, but thought she is at [facility].     A/P  Anemia  Hypotension  - Transfer to ICU  - Total 4U pRBCs  - 1U FFP  - A-line placement  - Protamine    Code:Full Code  Disposition: ICU     Nagi Samayoa MD PGY-1 IM   10/19/2024  5:39 PM

## 2024-10-19 NOTE — PLAN OF CARE
Problem: Chronic Conditions and Co-morbidities  Goal: Patient's chronic conditions and co-morbidity symptoms are monitored and maintained or improved  Recent Flowsheet Documentation  Taken 10/18/2024 2228 by Grace Lam RN  Care Plan - Patient's Chronic Conditions and Co-Morbidity Symptoms are Monitored and Maintained or Improved:   Monitor and assess patient's chronic conditions and comorbid symptoms for stability, deterioration, or improvement   Collaborate with multidisciplinary team to address chronic and comorbid conditions and prevent exacerbation or deterioration   Update acute care plan with appropriate goals if chronic or comorbid symptoms are exacerbated and prevent overall improvement and discharge     Problem: Chronic Conditions and Co-morbidities  Goal: Patient's chronic conditions and co-morbidity symptoms are monitored and maintained or improved  10/18/2024 2228 by Grace Lam RN  Outcome: Progressing  Flowsheets (Taken 10/18/2024 2228)  Care Plan - Patient's Chronic Conditions and Co-Morbidity Symptoms are Monitored and Maintained or Improved:   Monitor and assess patient's chronic conditions and comorbid symptoms for stability, deterioration, or improvement   Collaborate with multidisciplinary team to address chronic and comorbid conditions and prevent exacerbation or deterioration   Update acute care plan with appropriate goals if chronic or comorbid symptoms are exacerbated and prevent overall improvement and discharge     Problem: Pain  Goal: Verbalizes/displays adequate comfort level or baseline comfort level  Recent Flowsheet Documentation  Taken 10/18/2024 2228 by Grace Lam RN  Verbalizes/displays adequate comfort level or baseline comfort level:   Encourage patient to monitor pain and request assistance   Assess pain using appropriate pain scale   Administer analgesics based on type and severity of pain and evaluate response

## 2024-10-19 NOTE — PROGRESS NOTES
Pt c/o increased pain and pressure over right chest wall, area is warm to touch, no visible discoloration observed at this time. Pt with shallow breaths due to pain placed on 2L NC, BP soft, vitals otherwise normal. Neuro status unchanged. Hospitalist notified, placed new orders for stat chest CT and chest ultrasound. Request to page vasc. surgery residents regarding holding heparin if chest swelling and discomfort is related to bleed vs. potential abscess appropriateness.    Pt requested call be placed to her sister, Anneliese, attempted, left voicemail with request to return call and number. Anneliese's daughter returned call reporting Anneliese very recently passed away; Pt did not require reorientation about sisters passing upon notifying pt that her daughter returned call stating, \"that's right I forgot about Anneliese.\" Call received from pt's POA, granddaughter, Marsha, updated on pt condition and plan will call to update further upon receiving plan.    Update @ 1624: Heparin held at 1556 due to confirmed hematoma, vasc. Surgery residents marked edges and placed ace wrap around site to compress. Marsha updated on current findings. Will update with plan once communicated from surgery team.

## 2024-10-19 NOTE — PLAN OF CARE
Interval events of afternoon:     Notified by RN that primary team was concerned for abscess of chest wall, planning for CT non con of chest and would like to know if heparin drip should be held.     Patient seen and evaluated at bedside. Right chest wall/ breast with large area of swelling with associated ecchymosis in center inferior to axilla. Exam findings most consistent with hematoma, so CTA chest obtained with multiple areas of hyperattenuation noted.     Chest wrapped for compression and heparin gtt held.     Recommendations:   -okay to hold heparin gtt in setting of hematoma  -case discussed with IR, not a candidate for angiographic treatment  -conservative support including compression  -trend CBC q6h, transfuse for hgb <7        Leela Mcclellan MD  PGY2, General Surgery  10/19/24   4:40 PM   [employer]  [phone]

## 2024-10-19 NOTE — PROCEDURES
PROCEDURE NOTE  Date: 10/19/2024   Name: Valery Ramirez  YOB: 1950    Insert Arterial Line    Date/Time: 10/19/2024 7:34 PM    Performed by: Leela Mcclellan MD  Authorized by: Leela Mcclellan MD  Consent: The procedure was performed in an emergent situation.  Preparation: Patient was prepped and draped in the usual sterile fashion.  Indications: hemodynamic monitoring  Location: left femoral    Anesthesia:  Local Anesthetic: lidocaine 1% without epinephrine  Anesthetic total: 3 mL    Sedation:  Patient sedated: no    Number of attempts: 1  Post-procedure: line sutured and dressing applied  Patient tolerance: patient tolerated the procedure well with no immediate complications  Comments: EBL 3 cc. Left femoral line placed - 5 Fr sheath

## 2024-10-19 NOTE — PROGRESS NOTES
[facility] Note    Patient is on Heparin low dose, no bolus weight based infusion, which is being monitored & adjusted using aPTT as pt received an oral Factor-Xa inhibitor within 72 hrs of starting heparin infusion, which interacts with Anti-Xa monitoring of heparin.     It has now been 72 hours since heparin infusion was initiated, and the oral Factor-Xa inhibitor interaction with Anti-Xa levels is eliminated.  Heparin infusion will now be monitored & adjusted using Anti-Xa levels per the usual [facility] protocol.    Anti-Xa algorithm:  AntiXa < 0.10 Units/mL   NO BOLUS   Increase infusion by 4 units/kg/hr  0.1-0.29  Units/mL           NO BOLUS   Increase infusion by 2 units/kg/hr  0.3-0.5    Units/mL           No bolus        No change  0.51-0.99 Units/mL          No bolus        Decrease infusion by 2 units/kg/hr  1.0 Units/mL or greater    Hold heparin for 1 hour   Decrease infusion by 3 units/kg/hr    Hang infusion immediately after drawing initial labs.   Do NOT use ANY anti-Xa drawn prior to initiation of infusion for titration.     Anti Xa levels 6 hours after any rate change  When 2 successive Anti Xa's are at goal   monitor Anti Xa level at least daily      Please call with questions--  Azalea Dillard, PharmD, BCPS  Wireless: [phone]   10/19/2024 12:27 PM

## 2024-10-19 NOTE — SIGNIFICANT EVENT
Significant Event:    Rapid response called for patient at 5:03 PM due to hypotensive episode. RN reports HR of 40s and SBP of 60-70s.   Patient Hgb is 6.1 from 7.2. 2 units of blood was ordered.     On assessment patient is A&O x3.   Radial pulses palpable bilaterally, femoral pulses palpable. Pedal exam stable to prior. Patient was transported to ICU for further management.    Thi Delcid MD  PGY1, General Surgery  10/19/24  6:34 PM  [phone]

## 2024-10-19 NOTE — CONSULTS
cellulitis, abscess or hematoma. Denies any trauma.  -Will obtain CT chest with contrast and ultrasound soft tissue  -Continue ceftriaxone    Leukopenia and thrombocytopenia  Review of records reveal leukopenia and thrombocytopenia in the past.  Will continue to monitor.     Infectious   Altered mental status, resolved  Urinary tract infection  Given the recent change in mentation with confusion on 10/17, CT head stat was done without contrast which did not reveal any acute bleeding. There was concerns of underlying infection as she was febrile with new onset of altered mentation.   -Blood culture no growth to date  -UA significant for infection, will obtain urine culture.  -Continue Rocephin 5 days    Glycemic goal:  140-180 mg/dL  LDAs:  AV Fistula Right Arm, Peripheral IV Left Forearm, CVC Left Femoral, Arterial Line Left Femoral  Infusions:  Heparin [Held]  Abx:  Rocephin    Diet:  Diet NPO Exceptions are: Sips of Water with Meds  GI PPx:  Protonix 40 mg IV qd  Bowel Regimen: N/A  DVT PPx: on UFH gtt [Held]      Code Status:  Full Code  Disposition:  Current:  ICU  At discharge:  PRECIOUS Samayoa MD, PGY-1  Internal Medicine Resident  Contact via [employer]

## 2024-10-19 NOTE — PROGRESS NOTES
Pt transferred to [facility] from 4302 after rapid response called for severe hypotension, unable to get BP, even with doppler, machine had one reading of 70/?. Pt had rapidly enlarging chest hematoma develop this afternoon and then hypotension soon after. Hematoma wrapped by surgical residents for compression. 2 PRBC emergently ordered, obtained from blood bank and rapidly infused. On arrival to unit, surgical resident emergently placed CVC and luli. Pressures improved to SBP 100s after two units.     Critical lab call of hgb of 5.9 and hct 18.1, drawn prior to 2 units received. Additional unit PRBC and unit FFP ordered.

## 2024-10-19 NOTE — PROCEDURES
PROCEDURE NOTE  Date: 10/19/2024   Name: Valery Ramirez  YOB: 1950    CENTRAL LINE    Date/Time: 10/19/2024 7:35 PM    Performed by: Leela Mcclellan MD  Authorized by: Leela Mcclellan MD  Consent: The procedure was performed in an emergent situation.  Indications: vascular access  Anesthesia: local infiltration    Anesthesia:  Local Anesthetic: lidocaine 1% without epinephrine  Anesthetic total: 3 mL    Sedation:  Patient sedated: no    Preparation: skin prepped with ChloraPrep  Skin prep agent dried: skin prep agent completely dried prior to procedure  Sterile barriers: all five maximum sterile barriers used - cap, mask, sterile gown, sterile gloves, and large sterile sheet  Hand hygiene: hand hygiene performed prior to central venous catheter insertion  Location details: left femoral  Patient position: flat  Catheter type: triple lumen  Catheter size: 7 Fr  Ultrasound guidance: yes  Sterile ultrasound techniques: sterile gel and sterile probe covers were used  Number of attempts: 1  Post-procedure: line sutured and dressing applied  Assessment: blood return through all ports  Patient tolerance: patient tolerated the procedure well with no immediate complications  Comments: EBL 3 cc. L femoral central line placed

## 2024-10-20 PROBLEM — N18.9 CHRONIC KIDNEY DISEASE-MINERAL AND BONE DISORDER (CKD-MBD): Status: ACTIVE | Noted: 2024-10-20

## 2024-10-20 PROBLEM — M89.9 CHRONIC KIDNEY DISEASE-MINERAL AND BONE DISORDER (CKD-MBD): Status: ACTIVE | Noted: 2024-10-20

## 2024-10-20 PROBLEM — E83.9 CHRONIC KIDNEY DISEASE-MINERAL AND BONE DISORDER (CKD-MBD): Status: ACTIVE | Noted: 2024-10-20

## 2024-10-20 PROBLEM — I99.9 VASCULOPATHY: Status: ACTIVE | Noted: 2024-10-20

## 2024-10-20 PROBLEM — D63.1 ANEMIA OF CHRONIC RENAL FAILURE: Status: ACTIVE | Noted: 2024-10-20

## 2024-10-20 PROBLEM — N18.9 ANEMIA OF CHRONIC RENAL FAILURE: Status: ACTIVE | Noted: 2024-10-20

## 2024-10-20 LAB
ALBUMIN SERPL-MCNC: 3.5 G/DL (ref 3.4–5)
ANION GAP SERPL CALCULATED.3IONS-SCNC: 11 MMOL/L (ref 3–16)
ANION GAP SERPL CALCULATED.3IONS-SCNC: 11 MMOL/L (ref 3–16)
ANTI-XA UNFRAC HEPARIN: <0.1 IU/ML (ref 0.3–0.7)
BASOPHILS # BLD: 0 K/UL (ref 0–0.2)
BASOPHILS # BLD: 0 K/UL (ref 0–0.2)
BASOPHILS NFR BLD: 0.4 %
BASOPHILS NFR BLD: 0.6 %
BUN SERPL-MCNC: 26 MG/DL (ref 7–20)
BUN SERPL-MCNC: 27 MG/DL (ref 7–20)
CALCIUM SERPL-MCNC: 9.1 MG/DL (ref 8.3–10.6)
CALCIUM SERPL-MCNC: 9.2 MG/DL (ref 8.3–10.6)
CHLORIDE SERPL-SCNC: 98 MMOL/L (ref 99–110)
CHLORIDE SERPL-SCNC: 99 MMOL/L (ref 99–110)
CO2 SERPL-SCNC: 24 MMOL/L (ref 21–32)
CO2 SERPL-SCNC: 24 MMOL/L (ref 21–32)
CREAT SERPL-MCNC: 4.9 MG/DL (ref 0.6–1.2)
CREAT SERPL-MCNC: 5.3 MG/DL (ref 0.6–1.2)
DEPRECATED RDW RBC AUTO: 17 % (ref 12.4–15.4)
DEPRECATED RDW RBC AUTO: 17.1 % (ref 12.4–15.4)
EOSINOPHIL # BLD: 0 K/UL (ref 0–0.6)
EOSINOPHIL # BLD: 0.1 K/UL (ref 0–0.6)
EOSINOPHIL NFR BLD: 0.5 %
EOSINOPHIL NFR BLD: 0.9 %
GFR SERPLBLD CREATININE-BSD FMLA CKD-EPI: 8 ML/MIN/{1.73_M2}
GFR SERPLBLD CREATININE-BSD FMLA CKD-EPI: 9 ML/MIN/{1.73_M2}
GLUCOSE BLD-MCNC: 117 MG/DL (ref 70–99)
GLUCOSE BLD-MCNC: 83 MG/DL (ref 70–99)
GLUCOSE BLD-MCNC: 89 MG/DL (ref 70–99)
GLUCOSE BLD-MCNC: 94 MG/DL (ref 70–99)
GLUCOSE SERPL-MCNC: 94 MG/DL (ref 70–99)
GLUCOSE SERPL-MCNC: 95 MG/DL (ref 70–99)
HCT VFR BLD AUTO: 26 % (ref 36–48)
HCT VFR BLD AUTO: 26.2 % (ref 36–48)
HCT VFR BLD AUTO: 28.3 % (ref 36–48)
HCT VFR BLD AUTO: 28.8 % (ref 36–48)
HGB BLD-MCNC: 8.5 G/DL (ref 12–16)
HGB BLD-MCNC: 8.6 G/DL (ref 12–16)
HGB BLD-MCNC: 9.3 G/DL (ref 12–16)
HGB BLD-MCNC: 9.7 G/DL (ref 12–16)
LYMPHOCYTES # BLD: 0.4 K/UL (ref 1–5.1)
LYMPHOCYTES # BLD: 0.6 K/UL (ref 1–5.1)
LYMPHOCYTES NFR BLD: 6 %
LYMPHOCYTES NFR BLD: 6.9 %
MCH RBC QN AUTO: 30.8 PG (ref 26–34)
MCH RBC QN AUTO: 31.4 PG (ref 26–34)
MCHC RBC AUTO-ENTMCNC: 32.9 G/DL (ref 31–36)
MCHC RBC AUTO-ENTMCNC: 33.7 G/DL (ref 31–36)
MCV RBC AUTO: 93.1 FL (ref 80–100)
MCV RBC AUTO: 93.6 FL (ref 80–100)
MONOCYTES # BLD: 0.7 K/UL (ref 0–1.3)
MONOCYTES # BLD: 1.2 K/UL (ref 0–1.3)
MONOCYTES NFR BLD: 11.4 %
MONOCYTES NFR BLD: 12.4 %
NEUTROPHILS # BLD: 4.2 K/UL (ref 1.7–7.7)
NEUTROPHILS # BLD: 8.6 K/UL (ref 1.7–7.7)
NEUTROPHILS NFR BLD: 79.2 %
NEUTROPHILS NFR BLD: 81.7 %
PERFORMED ON: ABNORMAL
PERFORMED ON: NORMAL
PHOSPHATE SERPL-MCNC: 3.5 MG/DL (ref 2.5–4.9)
PLATELET # BLD AUTO: 145 K/UL (ref 135–450)
PLATELET # BLD AUTO: 98 K/UL (ref 135–450)
PMV BLD AUTO: 10 FL (ref 5–10.5)
PMV BLD AUTO: 10.3 FL (ref 5–10.5)
POTASSIUM SERPL-SCNC: 4.5 MMOL/L (ref 3.5–5.1)
POTASSIUM SERPL-SCNC: 4.5 MMOL/L (ref 3.5–5.1)
RBC # BLD AUTO: 2.78 M/UL (ref 4–5.2)
RBC # BLD AUTO: 3.09 M/UL (ref 4–5.2)
SODIUM SERPL-SCNC: 133 MMOL/L (ref 136–145)
SODIUM SERPL-SCNC: 134 MMOL/L (ref 136–145)
WBC # BLD AUTO: 10.5 K/UL (ref 4–11)
WBC # BLD AUTO: 5.3 K/UL (ref 4–11)

## 2024-10-20 PROCEDURE — 99223 1ST HOSP IP/OBS HIGH 75: CPT | Performed by: INTERNAL MEDICINE

## 2024-10-20 PROCEDURE — 85520 HEPARIN ASSAY: CPT

## 2024-10-20 PROCEDURE — 80069 RENAL FUNCTION PANEL: CPT

## 2024-10-20 PROCEDURE — 2580000003 HC RX 258

## 2024-10-20 PROCEDURE — 6360000002 HC RX W HCPCS

## 2024-10-20 PROCEDURE — 36620 INSERTION CATHETER ARTERY: CPT

## 2024-10-20 PROCEDURE — 6370000000 HC RX 637 (ALT 250 FOR IP)

## 2024-10-20 PROCEDURE — 85025 COMPLETE CBC W/AUTO DIFF WBC: CPT

## 2024-10-20 PROCEDURE — 85018 HEMOGLOBIN: CPT

## 2024-10-20 PROCEDURE — 2580000003 HC RX 258: Performed by: INTERNAL MEDICINE

## 2024-10-20 PROCEDURE — 36415 COLL VENOUS BLD VENIPUNCTURE: CPT

## 2024-10-20 PROCEDURE — 94761 N-INVAS EAR/PLS OXIMETRY MLT: CPT

## 2024-10-20 PROCEDURE — 2000000000 HC ICU R&B

## 2024-10-20 PROCEDURE — 85014 HEMATOCRIT: CPT

## 2024-10-20 PROCEDURE — 6370000000 HC RX 637 (ALT 250 FOR IP): Performed by: INTERNAL MEDICINE

## 2024-10-20 PROCEDURE — 99232 SBSQ HOSP IP/OBS MODERATE 35: CPT | Performed by: HOSPITALIST

## 2024-10-20 PROCEDURE — 2700000000 HC OXYGEN THERAPY PER DAY

## 2024-10-20 PROCEDURE — 90935 HEMODIALYSIS ONE EVALUATION: CPT

## 2024-10-20 RX ADMIN — BACITRACIN: 500 OINTMENT TOPICAL at 08:54

## 2024-10-20 RX ADMIN — WATER 1000 MG: 1 INJECTION INTRAMUSCULAR; INTRAVENOUS; SUBCUTANEOUS at 18:26

## 2024-10-20 RX ADMIN — ATORVASTATIN CALCIUM 80 MG: 80 TABLET, FILM COATED ORAL at 21:18

## 2024-10-20 RX ADMIN — BACITRACIN: 500 OINTMENT TOPICAL at 21:18

## 2024-10-20 RX ADMIN — HYDROMORPHONE HYDROCHLORIDE 2 MG: 2 TABLET ORAL at 21:18

## 2024-10-20 RX ADMIN — SODIUM CHLORIDE, PRESERVATIVE FREE 5 ML: 5 INJECTION INTRAVENOUS at 21:22

## 2024-10-20 RX ADMIN — SODIUM CHLORIDE, PRESERVATIVE FREE 10 ML: 5 INJECTION INTRAVENOUS at 08:54

## 2024-10-20 RX ADMIN — FLUOXETINE HYDROCHLORIDE 40 MG: 20 CAPSULE ORAL at 08:55

## 2024-10-20 RX ADMIN — BACITRACIN: 500 OINTMENT TOPICAL at 16:44

## 2024-10-20 ASSESSMENT — PAIN DESCRIPTION - DESCRIPTORS
DESCRIPTORS: ACHING
DESCRIPTORS: PINS AND NEEDLES

## 2024-10-20 ASSESSMENT — PAIN - FUNCTIONAL ASSESSMENT
PAIN_FUNCTIONAL_ASSESSMENT: PREVENTS OR INTERFERES SOME ACTIVE ACTIVITIES AND ADLS
PAIN_FUNCTIONAL_ASSESSMENT: PREVENTS OR INTERFERES SOME ACTIVE ACTIVITIES AND ADLS
PAIN_FUNCTIONAL_ASSESSMENT: ACTIVITIES ARE NOT PREVENTED

## 2024-10-20 ASSESSMENT — PAIN DESCRIPTION - ORIENTATION
ORIENTATION: RIGHT

## 2024-10-20 ASSESSMENT — PAIN SCALES - GENERAL
PAINLEVEL_OUTOF10: 3
PAINLEVEL_OUTOF10: 0
PAINLEVEL_OUTOF10: 7
PAINLEVEL_OUTOF10: 0
PAINLEVEL_OUTOF10: 2
PAINLEVEL_OUTOF10: 0

## 2024-10-20 ASSESSMENT — PAIN DESCRIPTION - ONSET
ONSET: ON-GOING
ONSET: ON-GOING

## 2024-10-20 ASSESSMENT — PAIN DESCRIPTION - FREQUENCY
FREQUENCY: CONTINUOUS
FREQUENCY: INTERMITTENT

## 2024-10-20 ASSESSMENT — PAIN DESCRIPTION - LOCATION
LOCATION: LEG
LOCATION: LEG
LOCATION: CHEST

## 2024-10-20 ASSESSMENT — PAIN DESCRIPTION - PAIN TYPE
TYPE: ACUTE PAIN
TYPE: ACUTE PAIN

## 2024-10-20 NOTE — DIALYSIS
Treatment time: 3 hours  Net UF: 500 ml     Pre weight: 65.3 kg  Post weight:64.8 kg  EDW: 66 kg (Challenging)  Crit Line Used: Yes Ending Profile: No  Refill Present: A    Access used: R AVF    Access function: Well with  ml/min     Medications or blood products given: n/a     Regular outpatient schedule: [facility]     Summary of response to treatment: Patient tolerated treatment well with asymptomatic hypotension alleviated with UFG decrease and levophed re-initiation due to low diastolic and MAP values. Patient remained stable throughout entire treatment and upon the dialysis RN exiting the ICU suite.     Report given to Consuelo Bauman RN and copy of dialysis treatment record placed in chart, to be scanned into EMR.

## 2024-10-20 NOTE — PROGRESS NOTES
\"ALT\", \"BILIDIR\", \"BILITOT\", \"ALKPHOS\" in the last 72 hours.    No results for input(s): \"INR\", \"LACTA\", \"TSH\" in the last 72 hours.      Urine Cultures: No results found for: \"LABURIN\"  Blood Cultures:   Lab Results   Component Value Date/Time    BC  10/17/2024 07:29 AM     No Growth to date.  Any change in status will be called.     Lab Results   Component Value Date/Time    BLOODCULT2  10/17/2024 07:29 AM     No Growth to date.  Any change in status will be called.     Organism: No results found for: \"ORG\"      Melva Patten MD

## 2024-10-20 NOTE — PROGRESS NOTES
ICU Progress Note    Admit Date: 10/14/2024  Day: 6  IV Access:Peripheral  IV Fluids:None  Vasopressors:None                Antibiotics: Rocephin  Diet: Diet NPO Exceptions are: Sips of Water with Meds    CC: RLE pain, Abdominal Pain, bloody diarrhea, vomiting    Interval history: Patient was having pain and pressure over right chest wall. Pt was having shallow breaths due to pain and was placed on 2L of NC.   Rapid Response called: BP low. Heparin continued to be held. 2PRBC given and surg placed CVC and luli. Pressures improved to SBP 100s. Hgb 5.9. Additional unit given. Transferred to ICU.     HPI: Valery Ramirez is a 74 y.o. female With a history of peripheral artery disease, CAD, HFpEF (last echo 5/2023 with EF 55%), ESRD, COPD, non-insulin-dependent DM 2, GERD, hyperlipidemia, hypertension, MASH, pulmonary hypertension, atrial fibrillation, history of DVT on Plavix and Xarelto that presents emergency department with multiple complaints.  Patient states that on Wednesday (9th) she started having abdominal pain and diarrhea.  She states that this pain has persisted.  She presented to [facility] and was admitted and labs were drawn.  CT scans were not performed.  She did receive dialysis on Wednesday at [facility].  She states that her right leg has been increasingly painful as well.  She did go to dialysis on Friday.  She missed dialysis today due to the severity of her symptoms.  She is primarily here because of her severe abdominal pain and uncontrolled vomiting.  She also has had diarrhea with dark red blood clots present and severe right lower extremity pain.  She was told that she had decreased vasculature of her right leg but no intervention has been planned at this point.  Patient does endorse chest pain without shortness of breath.  She denies fevers, chills. In ED patient R leg was found to be ischemic with nonpalpable pulses with doppler. ED was concerned for  10/19/24  1715 10/19/24  2111 10/20/24  0342 10/20/24  0603   WBC 6.8 10.9  --  10.5   HGB 5.9* 9.8* 9.3* 9.7*   HCT 18.4* 29.6* 28.3* 28.8*    166  --  145   MCV 99.4 95.0  --  93.1     Renal:    Recent Labs     10/19/24  0721 10/19/24  1715 10/20/24  0603   * 132* 134*  133*   K 4.6 4.7 4.5  4.5   CL 97* 96* 99  98*   CO2 25 22 24  24   BUN 17 21* 27*  26*   CREATININE 4.0* 4.5* 5.3*  4.9*   GLUCOSE 84 103* 94  95   CALCIUM 9.7 9.0 9.2  9.1   PHOS  --  2.4* 3.5   ANIONGAP 12 14 11  11     Hepatic: No results for input(s): \"AST\", \"ALT\", \"BILITOT\", \"BILIDIR\", \"LABALBU\", \"ALKPHOS\" in the last 72 hours.    Invalid input(s): \"PROT\"  Troponin: No results for input(s): \"TROPONINI\" in the last 72 hours.  BNP: No results for input(s): \"BNP\" in the last 72 hours.  Lipids: No results for input(s): \"CHOL\", \"HDL\" in the last 72 hours.    Invalid input(s): \"LDLCALCU\", \"TRIGLYCERIDE\"  ABGs:  No results for input(s): \"PHART\", \"NGJ7ZBK\", \"PO2ART\", \"XUL7HFH\", \"BEART\", \"THGBART\", \"G0BAREPS\", \"LNV2GAT\" in the last 72 hours.    INR: No results for input(s): \"INR\" in the last 72 hours.  Lactate: No results for input(s): \"LACTATE\" in the last 72 hours.  Cultures:  -----------------------------------------------------------------  RAD:   US SOFT TISSUE LIMITED AREA   Final Result      Large 14 cm hematoma over the right chest.      Electronically signed by MD Emigdio Elkins      CTA CHEST W CONTRAST   Final Result   1. Large hematoma over the right breast with areas suggestive for active active intravasation.   2. Right upper extremity AV fistula, possible stenosis at the distal cephalic vein   3. Stable prominent main pulmonary artery suggestive for pulmonary arterial hypertension   4. Small bowel pleural effusions, slightly larger on right   5. Dominant right thyroid nodule. This could be better characterized by ultrasound when patient's clinical condition improves.      These findings discussed with Surgery at 4:00

## 2024-10-20 NOTE — PROGRESS NOTES
Point of Care Note  General Surgery        Time: 1300  Date: 10/20/2024    Attempted to call granddaughter 3 times to update about angiogram tomorrow as well as to discuss the procedure. No response. Will attempt again later today when able.     Arsenio Rivera,   PGY-1, General Surgery Resident  10/20/24 1:16 PM  [phone]

## 2024-10-20 NOTE — PROGRESS NOTES
Vascular Surgery Daily Progress Note  Patient: Valery Ramirez    CC: R lower limb ischemia     Subjective   Interval assessments of hematoma remains stable. On levo 3, 3 units of pRBCs and 1 unit of FFP was given. Endorses pain everywhere.        ROS: A 14 point review of systems was conducted, significant findings as noted above. All other systems negative.    Objective :     Infusions:   sodium chloride      sodium chloride      sodium chloride      norepinephrine 3 mcg/min (10/20/24 0600)    [Held by provider] heparin (PORCINE) Infusion Stopped (10/19/24 1556)    dextrose      sodium chloride          I/O:I/O last 3 completed shifts:  In: 740 [P.O.:240]  Out: 2000            Wt Readings from Last 1 Encounters:   10/19/24 65.3 kg (143 lb 15.4 oz)       Exam:/75   Pulse (!) 47   Temp 97.9 °F (36.6 °C) (Oral)   Resp 16   Ht 1.626 m (5' 4\")   Wt 65.3 kg (143 lb 15.4 oz)   SpO2 100%   BMI 24.71 kg/m²     General appearance: alert, no acute distress, ill-appearing  Eyes: PERRL, no scleral icterus  Neck: trachea midline  Chest/Lungs: normal effort on RA. Large right chest wall/breast hematoma, central focus of original hematoma soft, evolving ecchymosis, and right breast is soft but enlarged. Chest binder in place.   Cardiovascular: RRR  Abdomen: soft, obese, tender to palpation throughout, non-distended, no guarding/rigidity  Skin: warm and dry, no rashes     Pulses     F P PT DP   R + -/+ - -/+   L + -/+ -/+ -/+    +, -/+ ,-/-        LABS:   Recent Labs     10/19/24  2111 10/20/24  0342 10/20/24  0603   WBC 10.9  --  10.5   HGB 9.8* 9.3* 9.7*   HCT 29.6* 28.3* 28.8*   MCV 95.0  --  93.1     --  145        Recent Labs     10/19/24  1715 10/20/24  0603   * 134*  133*   K 4.7 4.5  4.5   CL 96* 99  98*   CO2 22 24 24   PHOS 2.4* 3.5   BUN 21* 27*  26*   CREATININE 4.5* 5.3*  4.9*        No results for input(s): \"AST\", \"ALT\", \"BILIDIR\", \"BILITOT\", \"ALKPHOS\" in the last 72  hours.    Invalid input(s): \"ALB\"       No results for input(s): \"LIPASE\", \"AMYLASE\" in the last 72 hours.       Recent Labs     10/18/24  0618 10/19/24  0721   APTT 75.4* 63.2*      No results for input(s): \"CKTOTAL\", \"CKMB\", \"CKMBINDEX\", \"TROPONINI\" in the last 72 hours.    ASSESSMENT/PLAN: Pt. is a 74 y.o. female Hx of CHF, COPD, dysphagia and voice disorder, HTN, HLD, ESRD on HD MWF, gout, T2DM, hx of GI bleed, who presents to the hospital for one week of increased abdominal pain and bloody diarrhea. The patient also noted that for the last week her right foot and toes have become more red in color. Bilateral runoff angiogram on 9/27/2024 with TASC2 D lesions bilaterally with extensive calcifications. CTA abdomen and aorta with runoff showed Extensive calcific atherosclerotic disease of both lower extremities with severe stenosis of the right common femoral artery and bilateral superficial femoral arteries. Occlusion of the right renal artery and severe stenosis of the left renal artery. Atrophy of the right kidney is likely secondary to ischemia. Vascular surgery consulted at this time.       - Dialysis per nephro given contrast load yesterday, recommend dialysis today as tentative plan for procedure tomorrow  - HOLD Heparin, asa, plavix at this time from vascular surgery standpoint  - Recommend q1 neurovascular checks  - continue treatment of suspected UTI per primary   - Will continue to follow  - NPO at midnight, will preop for angiogram tomorrow   - Would recommend q6 CBC, if patient were to become hemodynamically unstable despite adequate resuscitation, should reengage IR for further eval for embolization    Arsenio Rivera DO  PGY-1, General Surgery Resident  10/20/24 8:35 AM  [phone]

## 2024-10-20 NOTE — PROGRESS NOTES
Overnight, patient received 1 unit of PRBC and 1 unit of FFP. See Results for patients follow up H&H and CBC lab draws. Hemodynamically, patients condition improved after receiving the blood products.

## 2024-10-20 NOTE — PLAN OF CARE
Problem: Skin/Tissue Integrity  Goal: Absence of new skin breakdown  Description: 1.  Monitor for areas of redness and/or skin breakdown  2.  Assess vascular access sites hourly  3.  Every 4-6 hours minimum:  Change oxygen saturation probe site  4.  Every 4-6 hours:  If on nasal continuous positive airway pressure, respiratory therapy assess nares and determine need for appliance change or resting period.  10/19/2024 2334 by Dion Day RN  Outcome: Progressing     Problem: Safety - Adult  Goal: Free from fall injury  10/19/2024 2334 by Dion Day RN  Outcome: Progressing     Problem: Chronic Conditions and Co-morbidities  Goal: Patient's chronic conditions and co-morbidity symptoms are monitored and maintained or improved  10/19/2024 2334 by Dion Day RN  Outcome: Progressing     Problem: Pain  Goal: Verbalizes/displays adequate comfort level or baseline comfort level  10/19/2024 2334 by Dion Day RN  Outcome: Progressing

## 2024-10-20 NOTE — PERIOP NOTE
PRE-OP NOTE  Department of Surgery  Resident Note     Procedure: RLE angiogram    Consent: Informed consent to be signed    Orders:   Diet: NPO after midnight  Pre op Medications:  Ancef OCTOR   Labs to be drawn: Type and Screen, Renal panel, CBC, INR  EKG reviewed 10/14  CXR reviewed 10/17    Arsenio Rivera DO  PGY-1, General Surgery Resident  10/20/24 1:02 PM  [place]  [phone]

## 2024-10-20 NOTE — PROGRESS NOTES
Nephrology Progress Note                                                                                                                                                                                                                                                                                                                                                               Office : [phone]     Fax :[phone]    Patient's Name: Valery Ramirez    Reason for Consult:  ESRD on HD  Requesting Physician:  Tori Mora MD (Inactive)  Chief Complaint:    Chief Complaint   Patient presents with    Diarrhea     Pt reports bloody diarrhea since Wednesday.     Nausea    Emesis       Assessment/Plan     # ESRD on HD   - We will dialyze her today on surgery team request   - Dialyzes on a MWF schedule  - Renally dose meds for ESRD  - Monitor renal labs     # R lower limb ischemia   - S/p bilateral runoff angiogram 9/27 with extensive calcifications  - Bilateral arterial - complete occlusion with no blood flow to the right   - Vascular surgery consulted - started on Heparin gtt   - Plan for RLE arteriogram on Monday 10/21     # HTN  - BP's soft. Hold CCB  - Monitor     # Anemia of chronic disease  - Hgb trending down. Add UMBERTO with HD   - Monitor     # Acid- base/ Electrolyte imbalance   - Lytes stable  - Monitor     # MBD management  - Cont home Calcitriol 0.5 mcg daily   - Phoslo with meals   - Low phos diet     # DM2   - Insulin management per primary     # CHF  - Cont vol management with HD   - Attempt UF as able - limited thus far by BP    # Worsening abdominal pain - Resolved   - Felt to be viral in etiology  - Symptomatic management for now             History of Present Ilness:    Valery Ramirez is a 74 y.o. female with a PMH of ESRD on HD, HTN, HLD, COPD, CHF, gout, DM2, h/o GI bleed, who presents to the hospital for one week of increased abdominal pain and bloody diarrhea. Patient also noted over  mg, BID  glucose chewable tablet 16 g, PRN  dextrose bolus 10% 125 mL, PRN   Or  dextrose bolus 10% 250 mL, PRN  glucagon injection 1 mg, PRN  dextrose 10 % infusion, Continuous PRN  sodium chloride flush 0.9 % injection 5-40 mL, 2 times per day  sodium chloride flush 0.9 % injection 5-40 mL, PRN  0.9 % sodium chloride infusion, PRN  ondansetron (ZOFRAN-ODT) disintegrating tablet 4 mg, Q8H PRN   Or  ondansetron (ZOFRAN) injection 4 mg, Q6H PRN  insulin lispro (HUMALOG,ADMELOG) injection vial 0-4 Units, 4x Daily AC & HS  [Held by provider] aspirin EC tablet 81 mg, Daily      Physical exam:     Vitals:  BP (!) 107/29   Pulse 53   Temp 97.7 °F (36.5 °C) (Oral)   Resp 13   Ht 1.626 m (5' 4\")   Wt 65.3 kg (143 lb 15.4 oz)   SpO2 99%   BMI 24.71 kg/m²   Constitutional:  OAA X3 NAD Yes  Skin: no rash, turgor wnl  Heent:  eomi, mmm  Neck: no bruits or jvd noted  Cardiovascular:  S1, S2 without m/r/g  Respiratory: CTA B. On supplemental O2  Abdomen: soft, nt, nd  Ext:  lower extremity edema No  Psychiatric: mood and affect appropriate   Musculoskeletal:  Rom, muscular strength intact    Data:   Labs:  CBC:   Recent Labs     10/19/24  2111 10/20/24  0342 10/20/24  0603 10/20/24  1050 10/20/24  1600   WBC 10.9  --  10.5 5.3  --    HGB 9.8*   < > 9.7* 8.5* 8.6*     --  145 98*  --     < > = values in this interval not displayed.     BMP:    Recent Labs     10/19/24  0721 10/19/24  1715 10/20/24  0603   * 132* 134*  133*   K 4.6 4.7 4.5  4.5   CL 97* 96* 99  98*   CO2 25 22 24  24   BUN 17 21* 27*  26*   CREATININE 4.0* 4.5* 5.3*  4.9*   GLUCOSE 84 103* 94  95     Ca/Mg/Phos:   Recent Labs     10/19/24  0721 10/19/24  1715 10/20/24  0603   CALCIUM 9.7 9.0 9.2  9.1   PHOS  --  2.4* 3.5     Hepatic:   No results for input(s): \"AST\", \"ALT\", \"BILITOT\", \"ALKPHOS\" in the last 72 hours.    Invalid input(s): \"ALB\"    Troponin: No results for input(s): \"TROPONINI\" in the last 72 hours.  BNP: No results for

## 2024-10-21 PROBLEM — R11.2 NAUSEA AND VOMITING: Status: ACTIVE | Noted: 2024-10-21

## 2024-10-21 LAB
ALBUMIN SERPL-MCNC: 3.2 G/DL (ref 3.4–5)
ALBUMIN SERPL-MCNC: 3.3 G/DL (ref 3.4–5)
ANION GAP SERPL CALCULATED.3IONS-SCNC: 11 MMOL/L (ref 3–16)
ANION GAP SERPL CALCULATED.3IONS-SCNC: 14 MMOL/L (ref 3–16)
BACTERIA BLD CULT ORG #2: NORMAL
BACTERIA BLD CULT: NORMAL
BASOPHILS # BLD: 0 K/UL (ref 0–0.2)
BASOPHILS # BLD: 0 K/UL (ref 0–0.2)
BASOPHILS NFR BLD: 0.3 %
BASOPHILS NFR BLD: 0.3 %
BUN SERPL-MCNC: 17 MG/DL (ref 7–20)
BUN SERPL-MCNC: 24 MG/DL (ref 7–20)
CALCIUM SERPL-MCNC: 8.9 MG/DL (ref 8.3–10.6)
CALCIUM SERPL-MCNC: 9.4 MG/DL (ref 8.3–10.6)
CHLORIDE SERPL-SCNC: 95 MMOL/L (ref 99–110)
CHLORIDE SERPL-SCNC: 98 MMOL/L (ref 99–110)
CO2 SERPL-SCNC: 24 MMOL/L (ref 21–32)
CO2 SERPL-SCNC: 24 MMOL/L (ref 21–32)
CREAT SERPL-MCNC: 4 MG/DL (ref 0.6–1.2)
CREAT SERPL-MCNC: 4.8 MG/DL (ref 0.6–1.2)
DEPRECATED RDW RBC AUTO: 17.2 % (ref 12.4–15.4)
DEPRECATED RDW RBC AUTO: 17.7 % (ref 12.4–15.4)
EOSINOPHIL # BLD: 0.1 K/UL (ref 0–0.6)
EOSINOPHIL # BLD: 0.1 K/UL (ref 0–0.6)
EOSINOPHIL NFR BLD: 1.3 %
EOSINOPHIL NFR BLD: 1.8 %
GFR SERPLBLD CREATININE-BSD FMLA CKD-EPI: 11 ML/MIN/{1.73_M2}
GFR SERPLBLD CREATININE-BSD FMLA CKD-EPI: 9 ML/MIN/{1.73_M2}
GLUCOSE BLD-MCNC: 104 MG/DL (ref 70–99)
GLUCOSE BLD-MCNC: 85 MG/DL (ref 70–99)
GLUCOSE BLD-MCNC: 88 MG/DL (ref 70–99)
GLUCOSE SERPL-MCNC: 79 MG/DL (ref 70–99)
GLUCOSE SERPL-MCNC: 99 MG/DL (ref 70–99)
HBV CORE AB SERPL QL IA: NEGATIVE
HCT VFR BLD AUTO: 24.3 % (ref 36–48)
HCT VFR BLD AUTO: 24.6 % (ref 36–48)
HCT VFR BLD AUTO: 25.6 % (ref 36–48)
HGB BLD-MCNC: 8 G/DL (ref 12–16)
HGB BLD-MCNC: 8.1 G/DL (ref 12–16)
HGB BLD-MCNC: 8.5 G/DL (ref 12–16)
INR PPP: 1.14 (ref 0.85–1.15)
LYMPHOCYTES # BLD: 0.4 K/UL (ref 1–5.1)
LYMPHOCYTES # BLD: 0.6 K/UL (ref 1–5.1)
LYMPHOCYTES NFR BLD: 6.9 %
LYMPHOCYTES NFR BLD: 7.2 %
MAGNESIUM SERPL-MCNC: 1.9 MG/DL (ref 1.8–2.4)
MCH RBC QN AUTO: 31.1 PG (ref 26–34)
MCH RBC QN AUTO: 31.2 PG (ref 26–34)
MCHC RBC AUTO-ENTMCNC: 32.9 G/DL (ref 31–36)
MCHC RBC AUTO-ENTMCNC: 33.2 G/DL (ref 31–36)
MCV RBC AUTO: 94 FL (ref 80–100)
MCV RBC AUTO: 94.6 FL (ref 80–100)
MONOCYTES # BLD: 0.6 K/UL (ref 0–1.3)
MONOCYTES # BLD: 0.9 K/UL (ref 0–1.3)
MONOCYTES NFR BLD: 11.4 %
MONOCYTES NFR BLD: 11.5 %
NEUTROPHILS # BLD: 4.3 K/UL (ref 1.7–7.7)
NEUTROPHILS # BLD: 6.3 K/UL (ref 1.7–7.7)
NEUTROPHILS NFR BLD: 79.3 %
NEUTROPHILS NFR BLD: 80 %
PERFORMED ON: ABNORMAL
PERFORMED ON: NORMAL
PERFORMED ON: NORMAL
PHOSPHATE SERPL-MCNC: 3.2 MG/DL (ref 2.5–4.9)
PHOSPHATE SERPL-MCNC: 3.5 MG/DL (ref 2.5–4.9)
PLATELET # BLD AUTO: 114 K/UL (ref 135–450)
PLATELET # BLD AUTO: 116 K/UL (ref 135–450)
PMV BLD AUTO: 10.6 FL (ref 5–10.5)
PMV BLD AUTO: 9.7 FL (ref 5–10.5)
POTASSIUM SERPL-SCNC: 4.2 MMOL/L (ref 3.5–5.1)
POTASSIUM SERPL-SCNC: 4.3 MMOL/L (ref 3.5–5.1)
PROTHROMBIN TIME: 14.8 SEC (ref 11.9–14.9)
RBC # BLD AUTO: 2.6 M/UL (ref 4–5.2)
RBC # BLD AUTO: 2.72 M/UL (ref 4–5.2)
SODIUM SERPL-SCNC: 133 MMOL/L (ref 136–145)
SODIUM SERPL-SCNC: 133 MMOL/L (ref 136–145)
WBC # BLD AUTO: 5.4 K/UL (ref 4–11)
WBC # BLD AUTO: 7.9 K/UL (ref 4–11)

## 2024-10-21 PROCEDURE — 36246 INS CATH ABD/L-EXT ART 2ND: CPT | Performed by: SURGERY

## 2024-10-21 PROCEDURE — 2709999900 HC NON-CHARGEABLE SUPPLY: Performed by: SURGERY

## 2024-10-21 PROCEDURE — 36415 COLL VENOUS BLD VENIPUNCTURE: CPT

## 2024-10-21 PROCEDURE — 85025 COMPLETE CBC W/AUTO DIFF WBC: CPT

## 2024-10-21 PROCEDURE — C1760 CLOSURE DEV, VASC: HCPCS | Performed by: SURGERY

## 2024-10-21 PROCEDURE — B41F1ZZ FLUOROSCOPY OF RIGHT LOWER EXTREMITY ARTERIES USING LOW OSMOLAR CONTRAST: ICD-10-PCS | Performed by: SURGERY

## 2024-10-21 PROCEDURE — 2580000003 HC RX 258

## 2024-10-21 PROCEDURE — 87086 URINE CULTURE/COLONY COUNT: CPT

## 2024-10-21 PROCEDURE — 6370000000 HC RX 637 (ALT 250 FOR IP)

## 2024-10-21 PROCEDURE — 2500000003 HC RX 250 WO HCPCS

## 2024-10-21 PROCEDURE — 2500000003 HC RX 250 WO HCPCS: Performed by: SURGERY

## 2024-10-21 PROCEDURE — 97530 THERAPEUTIC ACTIVITIES: CPT

## 2024-10-21 PROCEDURE — 6360000002 HC RX W HCPCS

## 2024-10-21 PROCEDURE — 94680 O2 UPTK RST&XERS DIR SIMPLE: CPT

## 2024-10-21 PROCEDURE — 97535 SELF CARE MNGMENT TRAINING: CPT

## 2024-10-21 PROCEDURE — 85018 HEMOGLOBIN: CPT

## 2024-10-21 PROCEDURE — 75710 ARTERY X-RAYS ARM/LEG: CPT | Performed by: SURGERY

## 2024-10-21 PROCEDURE — 99152 MOD SED SAME PHYS/QHP 5/>YRS: CPT | Performed by: SURGERY

## 2024-10-21 PROCEDURE — C1894 INTRO/SHEATH, NON-LASER: HCPCS | Performed by: SURGERY

## 2024-10-21 PROCEDURE — 85014 HEMATOCRIT: CPT

## 2024-10-21 PROCEDURE — C1769 GUIDE WIRE: HCPCS | Performed by: SURGERY

## 2024-10-21 PROCEDURE — 94618 PULMONARY STRESS TESTING: CPT

## 2024-10-21 PROCEDURE — 97116 GAIT TRAINING THERAPY: CPT

## 2024-10-21 PROCEDURE — 99233 SBSQ HOSP IP/OBS HIGH 50: CPT | Performed by: INTERNAL MEDICINE

## 2024-10-21 PROCEDURE — 85610 PROTHROMBIN TIME: CPT

## 2024-10-21 PROCEDURE — 6360000004 HC RX CONTRAST MEDICATION: Performed by: SURGERY

## 2024-10-21 PROCEDURE — 6360000002 HC RX W HCPCS: Performed by: SURGERY

## 2024-10-21 PROCEDURE — C1887 CATHETER, GUIDING: HCPCS | Performed by: SURGERY

## 2024-10-21 PROCEDURE — 80069 RENAL FUNCTION PANEL: CPT

## 2024-10-21 PROCEDURE — 36620 INSERTION CATHETER ARTERY: CPT

## 2024-10-21 PROCEDURE — 2060000000 HC ICU INTERMEDIATE R&B

## 2024-10-21 PROCEDURE — 83735 ASSAY OF MAGNESIUM: CPT

## 2024-10-21 RX ORDER — PANTOPRAZOLE SODIUM 40 MG/1
40 TABLET, DELAYED RELEASE ORAL
Qty: 120 TABLET | Refills: 0 | Status: SHIPPED | OUTPATIENT
Start: 2024-10-21 | End: 2024-12-20

## 2024-10-21 RX ORDER — IOPAMIDOL 755 MG/ML
INJECTION, SOLUTION INTRAVASCULAR PRN
Status: DISCONTINUED | OUTPATIENT
Start: 2024-10-21 | End: 2024-10-21 | Stop reason: HOSPADM

## 2024-10-21 RX ORDER — MIDAZOLAM HYDROCHLORIDE 1 MG/ML
INJECTION, SOLUTION INTRAMUSCULAR; INTRAVENOUS PRN
Status: DISCONTINUED | OUTPATIENT
Start: 2024-10-21 | End: 2024-10-21 | Stop reason: HOSPADM

## 2024-10-21 RX ADMIN — CEFAZOLIN 2000 MG: 2 INJECTION, POWDER, FOR SOLUTION INTRAVENOUS at 07:41

## 2024-10-21 RX ADMIN — CALCIUM ACETATE 667 MG: 667 CAPSULE ORAL at 09:55

## 2024-10-21 RX ADMIN — HYDROMORPHONE HYDROCHLORIDE 2 MG: 2 TABLET ORAL at 11:29

## 2024-10-21 RX ADMIN — WATER 1000 MG: 1 INJECTION INTRAMUSCULAR; INTRAVENOUS; SUBCUTANEOUS at 17:00

## 2024-10-21 RX ADMIN — ATORVASTATIN CALCIUM 80 MG: 80 TABLET, FILM COATED ORAL at 20:41

## 2024-10-21 RX ADMIN — ALLOPURINOL 200 MG: 100 TABLET ORAL at 09:55

## 2024-10-21 RX ADMIN — CALCIUM ACETATE 667 MG: 667 CAPSULE ORAL at 20:41

## 2024-10-21 RX ADMIN — BACITRACIN: 500 OINTMENT TOPICAL at 15:44

## 2024-10-21 RX ADMIN — FLUOXETINE HYDROCHLORIDE 40 MG: 20 CAPSULE ORAL at 09:55

## 2024-10-21 RX ADMIN — BACITRACIN: 500 OINTMENT TOPICAL at 09:56

## 2024-10-21 RX ADMIN — CALCIUM ACETATE 667 MG: 667 CAPSULE ORAL at 15:45

## 2024-10-21 RX ADMIN — SODIUM CHLORIDE, PRESERVATIVE FREE 10 ML: 5 INJECTION INTRAVENOUS at 09:49

## 2024-10-21 ASSESSMENT — PAIN SCALES - GENERAL
PAINLEVEL_OUTOF10: 0
PAINLEVEL_OUTOF10: 10
PAINLEVEL_OUTOF10: 0
PAINLEVEL_OUTOF10: 0
PAINLEVEL_OUTOF10: 10

## 2024-10-21 ASSESSMENT — ENCOUNTER SYMPTOMS
EYES NEGATIVE: 1
CHOKING: 0
BACK PAIN: 0
SHORTNESS OF BREATH: 0
ABDOMINAL PAIN: 1
STRIDOR: 0
ABDOMINAL PAIN: 0

## 2024-10-21 ASSESSMENT — PAIN DESCRIPTION - DESCRIPTORS
DESCRIPTORS: THROBBING
DESCRIPTORS: THROBBING

## 2024-10-21 ASSESSMENT — PAIN DESCRIPTION - LOCATION
LOCATION: FOOT
LOCATION: FOOT

## 2024-10-21 ASSESSMENT — PAIN DESCRIPTION - ORIENTATION: ORIENTATION: RIGHT

## 2024-10-21 NOTE — CONSULTS
[facility]  Palliative Medicine Consultation Note      Date Of Admission:10/14/2024  Date of consult: 10/21/24  Seen by PC in the past:  No    Recommendations:        Met with the patient and introduced palliative care.  Valery is doing well after her RLE angiogram.  She has no physical complaints at this time.  She denies having any leg pain, chest pain, shortness of breath, headache, or other symptoms.  She is quite frustrated about not being allowed to get up and go to the bathroom by herself.  She states she is very independent at home and is frustrated at the lack of independence while here, inpatient.  As a result, she is anxious to get back home where she is independent.  She does express some displeasure with some of her care and would like to speak with the RN manager about this.     Valery wants to go home and be independent.  While she is open to future procedures for her right leg, she understands that this is not an option for her right now according to vascular surgery.  We discussed CODE STATUS as well and she states very clearly she is a DNR CCA.  Valery confirms that her granddaughter Marsha is her HCPOA and has paperwork for this.  Furthermore, on discussion with Marsha, they are not really interested in palliative care outpatient referral at this time but are willing to get contact information for the future.     1. Goals of Care/Advanced Care planning/Code status: DNR-CCA according to patient; Marsha (pt's POA and granddaughter) understands this code status and her grandmother's wishes.   2. Pain: none at this time   3. SOB: none   4. Disposition: pt wants to go home and be independent. Will likely need PT/OT evaluation & recommendations.      Reason for Consult:         [x]  Goals of Care   [x]  Code Status Discussion/Advanced Care Planning   []  Psychosocial/Family Support   []  Symptom Management   []  Other (Specify)     Requesting Physician:   proximal duodenal wall.   4.  No acute nonvascular findings.   5.  Cirrhosis with splenomegaly and secondary signs of portal hypertension.      Electronically signed by Bhavin Miller      XR CHEST (2 VW)   Final Result   1. Cardiomegaly otherwise no acute cardiopulmonary abnormality.      Electronically signed by Duarte Smith MD        Conclusion/Time spent:         Recommendations see above    Time spent with patient and/or family: 45  Time reviewing records: 10 min   Time communicating with staff: 5 min     A total of  minutes spent with the patient and family on unit greater than 50% in counseling regarding palliative care and in goals of care for the patient.    Thank you to Dr. Patten for this consultation. We will continue to follow Ms. Ramirez's care as needed.

## 2024-10-21 NOTE — PLAN OF CARE
Problem: Safety - Adult  Goal: Free from fall injury  Outcome: Progressing  Flowsheets (Taken 10/21/2024 1846)  Free From Fall Injury: Instruct family/caregiver on patient safety  Note: Pt is a Fall Risk. See Manuel Fall Risk Score. Pt bed in low position and side rails up. Call light and belongings in reach. Pt encouraged to call for assistance. Will continue with hourly rounds for PO intake, pain needs, toileting, and repositioning as needed.        Problem: Chronic Conditions and Co-morbidities  Goal: Patient's chronic conditions and co-morbidity symptoms are monitored and maintained or improved  Outcome: Progressing  Flowsheets (Taken 10/21/2024 1846)  Care Plan - Patient's Chronic Conditions and Co-Morbidity Symptoms are Monitored and Maintained or Improved:   Monitor and assess patient's chronic conditions and comorbid symptoms for stability, deterioration, or improvement   Collaborate with multidisciplinary team to address chronic and comorbid conditions and prevent exacerbation or deterioration     Problem: Chronic Conditions and Co-morbidities  Goal: Patient's chronic conditions and co-morbidity symptoms are monitored and maintained or improved  Recent Flowsheet Documentation  Taken 10/21/2024 1846 by Vanna Turner RN  Care Plan - Patient's Chronic Conditions and Co-Morbidity Symptoms are Monitored and Maintained or Improved:   Monitor and assess patient's chronic conditions and comorbid symptoms for stability, deterioration, or improvement   Collaborate with multidisciplinary team to address chronic and comorbid conditions and prevent exacerbation or deterioration     Problem: Pain  Goal: Verbalizes/displays adequate comfort level or baseline comfort level  Recent Flowsheet Documentation  Taken 10/21/2024 1846 by Vanna Turner RN  Verbalizes/displays adequate comfort level or baseline comfort level:   Encourage patient to monitor pain and request assistance   Assess pain using appropriate pain scale

## 2024-10-21 NOTE — CARE COORDINATION
Today had vascular procedure in OR: Procedure:  Ultrasound guided access left lower extremity with proglide closure and Right lower extremity arteriogram. Awaiting PT/OT evals and patient has requested that she goes home at discharge and granddaughter requesting HHC. Patient has new O2 use and may need home O2 eval if not able to wean. CM will continue to follow patient until discharge.  Electronically signed by Katie Hartman RN on 10/21/2024 at 12:45 PM

## 2024-10-21 NOTE — BRIEF OP NOTE
Brief Postoperative Note      Patient: Valery Ramirez  YOB: 1950  MRN: [id number]    Date of Procedure: 10/21/2024    Pre-Op Diagnosis Codes:      * PAD (peripheral artery disease) ([facility]) [I73.9]    Post-Op Diagnosis: Same       Procedure:  Ultrasound guided access left lower extremity with proglide closure  Right lower extremity arteriogram    Surgeon(s):  Jeannie Pierson MD    Assistant:  * No surgical staff found *    Anesthesia: IV Sedation    Estimated Blood Loss (mL): Minimal    Complications: None    Specimens:   * No specimens in log *    Implants:  * No implants in log *      Drains: * No LDAs found *    Findings:  Infection Present At Time Of Surgery (PATOS) (choose all levels that have infection present):  - Organ Space infection (below fascia) present as evidenced by osteomyelitis  Other Findings: Diffuse calcified disease at all levels.  Severe distal right external iliac stenosis and common femoral artery stenosis.  Total occlusion of the right superficial femoral artery, flush at the origin.  Reconstitution of the popliteal artery.  Two-vessel runoff to the foot.  Severe pedal arch disease.    Discussed with granddaughter Marsha by phone.  No good option for percutaneous intervention.  Given her severe multiple comorbidities, I do not recommend open bypass surgery.  I do not think that she would tolerate or ultimately survive this.  We talked extensively about management options.  I have recommended a palliative care consultation.  The granddaughter is in agreement with this.    Electronically signed by Jeannie Pierson MD on 10/21/2024 at 9:20 AM

## 2024-10-21 NOTE — PROGRESS NOTES
Handoff report done: patient is asleep but easily arousable.   Patient has slurred speech (chronic) would attempt to verbalize but gets frustrated and agitated. Alert/oriented x2, episodes of confusion. Would complain of pain on RL leg if touched, noted binder on upper chest, noted redness RA. 02 at 2lpm, Sinus rebecca with BB, BP with levophed at 1 mcg/min via [place] pressures, Cuff pressure not reliable-pt kept BP cuff loose at LL arm.   Had HD today, plan for RLE angio kt; NPO at MN.     [place] at Left Femoral-positional, dampened waveform. 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 Call light within reach.    Attending with

## 2024-10-21 NOTE — DISCHARGE INSTR - COC
Continuity of Care Form    Patient Name: Valery Ramirez   :  1950  MRN:  [id number]    Admit date:  10/14/2024  Discharge date:  ***    Code Status Order: DNR-CCA   Advance Directives:   Advance Care Flowsheet Documentation             Admitting Physician:  Kellen Arreola MD  PCP: Tori Mora MD (Inactive)    Discharging Nurse: ***  Discharging Hospital Unit/Room#: 4519/4519-01  Discharging Unit Phone Number: ***    Emergency Contact:   Extended Emergency Contact Information  Primary Emergency Contact: Marsha Arshad  Address: [address]           [address]           [place] [address]  Mobile Phone: [phone]  Relation: Grandchild  Secondary Emergency Contact: Margareth Prado  Address: [address] [place] [address]  Mobile Phone: [phone]  Relation: Friend    Past Surgical History:  Past Surgical History:   Procedure Laterality Date    CARPAL TUNNEL RELEASE Bilateral     CHOLECYSTECTOMY      CORONARY ANGIOPLASTY WITH STENT PLACEMENT      DIALYSIS FISTULA CREATION      non-functioning    DIALYSIS FISTULA CREATION Right 2024    RIGHT ARM ARTERIOVENOUS FISTULA CREATION performed by Cornelius Woods II, MD at [facility] OR    ENTEROSCOPY N/A 2018    ENTEROSCOPY PUSH CONTROL HEMORRHAGE performed by Tai Chavarria MD at [facility] ENDOSCOPY    TOTAL KNEE ARTHROPLASTY Bilateral     UPPER GASTROINTESTINAL ENDOSCOPY N/A 2023    EGD CONTROL HEMORRHAGE performed by Tai Chavarria MD at [facility] ENDOSCOPY    UPPER GASTROINTESTINAL ENDOSCOPY N/A 2024    ENTEROSCOPY PUSH CONTROL HEMORRHAGE performed by Tony Maria MD at [facility] ENDOSCOPY    UPPP UVULOPALATOPHARYGOPLASTY         Immunization History:   Immunization History   Administered Date(s) Administered    COVID-19, PFIZER PURPLE top, DILUTE for use, (age 12 y+), 30mcg/0.3mL 2021, 2021, 12/15/2021       Active Problems:  Patient Active Problem List   Diagnosis Code    Chest pain R07.9  if no increase in weight.  Dehydration:  having difficulty or a decrease in urination, dizziness, worsening fatigue, or new onset/worsening of generalized weakness.     Please continue a LOW SODIUM diet and LIMIT fluid intake to 48 - 64 ounces ( 1.5 - 2 liters) per day.     Call [facility] [phone] and Tori Mora MD (inactive) [phone] with any questions or concerns.   Please continue heart failure education to patient and family/support system.  See After Visit Summary for hospital follow up appointment details.  Consider spiritual care referral for support and/or completion of advance directives .  Consider: Home Care Vitals telehealth program if patient agreeable and able to participate and palliative care consult for ongoing goals of care, end of life, and/or chronic disease management discussions.  Patient's primary cardiologist is Dr Calix.       Rehab Therapies: {THERAPEUTIC INTERVENTION:7583257212}  Weight Bearing Status/Restrictions: { CC Weight Bearin}  Other Medical Equipment (for information only, NOT a DME order):  {EQUIPMENT:447438476}  Other Treatments: ***    Patient's personal belongings (please select all that are sent with patient):  {CHP DME Belongings:869618492}    RN SIGNATURE:  {Esignature:[id number]}    CASE MANAGEMENT/SOCIAL WORK SECTION    Inpatient Status Date: ***    Readmission Risk Assessment Score:  Readmission Risk              Risk of Unplanned Readmission:  26           Discharging to Facility/ Agency     [address] [address]  Phone: [phone]  Fax: [phone]     [facility]   Address: [facility] [place] [id number]  Phone: [phone]    / signature: Electronically signed by Katie Hartman RN on 10/21/24 at 5:38 PM EDT    PHYSICIAN SECTION    Prognosis: Fair    Condition at Discharge: Stable    Rehab Potential (if transferring to Rehab):

## 2024-10-21 NOTE — PROGRESS NOTES
Physical Therapy  Facility/Department: [facility] ICU  Physical Therapy treatment     Name: Valery Ramirez  : 1950  MRN: [id number]  Date of Service: 10/21/2024    Discharge Recommendations:  Home with Home health PT, 24 hour supervision or assist (vs continued inpt PT)   PT Equipment Recommendations  Equipment Needed: No      Patient Diagnosis(es): The primary encounter diagnosis was Nausea and vomiting, unspecified vomiting type. Diagnoses of Generalized abdominal pain, PAD (peripheral artery disease) (HCC), Atherosclerosis of arteries of extremities (HCC), and Hypotension due to hypovolemia were also pertinent to this visit.  Past Medical History:  has a past medical history of CHF (congestive heart failure) (HCC), COPD (chronic obstructive pulmonary disease) (HCC), Dysphagia, GI bleed, Gout, History of hemodialysis, Hyperlipidemia, Hypertension, Polyp of colon, Renal failure, and Voice disorder.  Past Surgical History:  has a past surgical history that includes Dialysis fistula creation; Total knee arthroplasty (Bilateral); Carpal tunnel release (Bilateral); Cholecystectomy; UPPP; Coronary angioplasty with stent; Enteroscopy (N/A, 2018); Upper gastrointestinal endoscopy (N/A, 2023); Upper gastrointestinal endoscopy (N/A, 2024); and Dialysis fistula creation (Right, 2024).    Assessment  Assessment: Pt demo functioning below her reported baseline of independent with ADLs and gait with 4 wheeled RW. Pt requiring SBA for bed mobility, CGA for transfers, and CGA for gait with RW. Pt currently on 3 L of oxygen. PT SPO2 dropped to 79 following gait in the parrish and quickly increased to >90. Pt demo decreased endurance with activity and impaired functional mobility. Pt reporting that she wants to go home as soon as possible and is eager to leave the hospital. Safety concerns for the pt returning home due to fall risk. If home rec 24 hr assist and home PT. D/c rec: home with 24 hr assist and  ongoing  Patient Goals   Patient Goals : return home when able       Education  Patient Education  Education Given To: Patient  Education Provided: Role of Therapy;Plan of Care  Education Provided Comments: Pt educated on the benefits of continued PT and safety concerns for returning home  Education Method: Demonstration;Verbal  Barriers to Learning: None  Education Outcome: Continued education needed      Therapy Time   Individual Concurrent Group Co-treatment   Time In 1419         Time Out 1525         Minutes 66             Timed Code Treatment Minutes:  66    Total Treatment Minutes:   66    SPENCER Souza       Therapist was present, directed the patient's care, made skilled judgement, and was responsible for assessment and treatment of the patient.  Nayla Eller, PT [id number]

## 2024-10-21 NOTE — PROGRESS NOTES
10/21/24 1712   Resting (Room Air)   SpO2 87   HR 57   Resting (On O2)   SpO2 96   HR 67   O2 Device Nasal cannula   O2 Flow Rate (l/min) 3 l/min   During Walk (On O2)   SpO2 91   HR 64   O2 Device Nasal cannula   O2 Flow Rate (l/min) 3 l/min   Need Additional O2 Flow Rate Rows No   Rate of Dyspnea 0   After Walk   SpO2 93   HR 57   O2 Flow Rate (l/min) 3 l/min   Rate of Dyspnea 0   Does the Patient Qualify for Home O2 Yes   Liter Flow at Rest 3   Liter Flow on Exertion 3   Does the Patient Need Portable Oxygen Tanks Yes

## 2024-10-21 NOTE — PROGRESS NOTES
ICU Progress Note    Admit Date: 10/14/2024  Day: 1  Vent Day: 1  IV Access:Peripheral  IV Fluids:None  Vasopressors:None                Antibiotics: NA  Diet: ADULT DIET; Regular; 3 carb choices (45 gm/meal)  ADULT ORAL NUTRITION SUPPLEMENT; Breakfast, Lunch, Dinner; Standard High Calorie/High Protein Oral Supplement    CC: RLE pain, Abdominal Pain, bloody diarrhea, vomiting     Interval history:   NAEON  Off pressors now  Hb stable  Angiogram done today, no intervention recommended by sgy, instead recommended palliative consult  Palliative talked to her and she changed her code     HPI:   Valery Ramirez is a 74 y.o. female With a history of PVD, CAD, HFpEF (last echo 5/2023 with EF 55%), ESRD, COPD, DM 2, HLD, HTN, MASH, pulmonary hypertension, atrial fibrillation, history of DVT on Plavix and Xarelto that presents emergency department with multiple complaints.  Patient states that on Wednesday (10/9/24) she started having abdominal pain and diarrhea.  She states that this pain has persisted.  She presented to [facility] and was admitted and labs were drawn.  CT scans were not performed.  She did receive dialysis on Wednesday at [facility].  She states that her right leg has been increasingly painful as well.  She did go to dialysis on Friday.  She missed dialysis today due to the severity of her symptoms.  She is primarily here because of her severe abdominal pain and uncontrolled vomiting.  She also has had diarrhea with dark red blood clots present and severe right lower extremity pain.  She was told that she had decreased vasculature of her right leg but no intervention has been planned at this point.  Patient does endorse chest pain without shortness of breath.  She denies fevers, chills. In ED patient R leg was found to be ischemic with nonpalpable pulses with doppler. ED was concerned for mesenteric ichemia so CTA performed with runoff to R lower extremity. Lactate normal.

## 2024-10-21 NOTE — PROGRESS NOTES
Nephrology Progress Note                                                                                                                                                                                                                                                                                                                                                               Office : [phone]     Fax :[phone]    Patient's Name: Valery Ramirez    Reason for Consult:  ESRD on HD  Requesting Physician:  Tori Mora MD (Inactive)  Chief Complaint:    Chief Complaint   Patient presents with    Diarrhea     Pt reports bloody diarrhea since Wednesday.     Nausea    Emesis       Assessment/Plan     # ESRD on HD   - Dialyzes on a MWF schedule; s/p HD yesterday off schedule d/t surgery today   - Renally dose meds for ESRD  - Monitor renal labs     # R lower limb ischemia   - S/p bilateral runoff angiogram 9/27 with extensive calcifications  - Bilateral arterial - complete occlusion with no blood flow to the right   - Vascular surgery consulted - started on Heparin gtt   - S/p RLE arteriogram this AM    # HTN  - BP's soft. Hold CCB  - Monitor     # Anemia of chronic disease  - Hgb trending down. Added UMBERTO with HD   - Monitor     # Acid- base/ Electrolyte imbalance   - Lytes stable  - Monitor     # MBD management  - Cont home Calcitriol 0.5 mcg daily   - Phoslo with meals   - Low phos diet     # DM2   - Insulin management per primary     # CHF  - Cont vol management with HD   - Attempt UF as able - limited thus far by BP    # Worsening abdominal pain - Resolved   - Felt to be viral in etiology  - Symptomatic management for now           History of Present Ilness:    Valery Ramirez is a 74 y.o. female with a PMH of ESRD on HD, HTN, HLD, COPD, CHF, gout, DM2, h/o GI bleed, who presents to the hospital for one week of increased abdominal pain and bloody diarrhea. Patient also noted over the last week that

## 2024-10-21 NOTE — CARE COORDINATION
Called granddaughter. Evidently, the vascular doctor told the patient she could home. Patient called granddaughter to come to get her at hospital. The granddaughter was told that the attending doctor (Dr. Patten planned on patient going home tomorrow because she wanted to be sure the patient's respiratory status was stable. The granddaughter is fine with waiting until tomorrow and will talk to the patient. Meanwhile, patient has been set up with home care for OT,PT, home health aide and nurse for laboratory draws. Called [facility] and awiting return call. Faxed orders to [facility] [place] [address]  Phone: [phone]  Fax: [phone]   CM will continue to follow patient until discharge.  Electronically signed by Katie Hartman RN on 10/21/2024 at 5:49 PM

## 2024-10-21 NOTE — PROGRESS NOTES
Vascular Surgery Daily Progress Note  Patient: Valery Ramirez    CC: R lower limb ischemia     Subjective   No acute events overnight. Patient is off of levo. Patient reports diffuse pain of her body.        ROS: A 14 point review of systems was conducted, significant findings as noted above. All other systems negative.    Objective :     Infusions:   sodium chloride      sodium chloride      sodium chloride      norepinephrine 1 mcg/min (10/20/24 2200)    [Held by provider] heparin (PORCINE) Infusion Stopped (10/19/24 1556)    dextrose      sodium chloride          I/O:I/O last 3 completed shifts:  In: 1366.3 [P.O.:495; I.V.:42; Blood:781.5; IV Piggyback:47.8]  Out: 0            Wt Readings from Last 1 Encounters:   10/20/24 65.3 kg (143 lb 15.4 oz)       Exam:BP (!) 128/40   Pulse 53   Temp 98.2 °F (36.8 °C) (Axillary)   Resp 22   Ht 1.626 m (5' 4\")   Wt 65.3 kg (143 lb 15.4 oz)   SpO2 97%   BMI 24.71 kg/m²     General appearance: alert, no acute distress, ill-appearing  Eyes: PERRL, no scleral icterus  Neck: trachea midline  Chest/Lungs: normal effort on RA. Large right chest wall/breast hematoma, central focus of original hematoma soft, evolving ecchymosis, and right breast is soft but enlarged. Chest binder in place.   Cardiovascular: RRR  Abdomen: soft, obese, tender to palpation throughout, non-distended, no guarding/rigidity  Skin: warm and dry, no rashes     Pulses     F P PT DP   R + -/+ - -/+   L + -/+ + +    +, -/+ ,-/-        LABS:   Recent Labs     10/20/24  2337 10/21/24  0326   WBC 5.4 7.9   HGB 8.1* 8.5*   HCT 24.6* 25.6*   MCV 94.6 94.0   * 116*        Recent Labs     10/20/24  0603 10/21/24  0326   *  133* 133*   K 4.5  4.5 4.2   CL 99  98* 98*   CO2 24 24 24   PHOS 3.5 3.2   BUN 27*  26* 17   CREATININE 5.3*  4.9* 4.0*        No results for input(s): \"AST\", \"ALT\", \"BILIDIR\", \"BILITOT\", \"ALKPHOS\" in the last 72 hours.    Invalid input(s): \"ALB\"       No results for

## 2024-10-21 NOTE — PLAN OF CARE
Problem: Skin/Tissue Integrity  Goal: Absence of new skin breakdown  Description: 1.  Monitor for areas of redness and/or skin breakdown  2.  Assess vascular access sites hourly  3.  Every 4-6 hours minimum:  Change oxygen saturation probe site  4.  Every 4-6 hours:  If on nasal continuous positive airway pressure, respiratory therapy assess nares and determine need for appliance change or resting period.  10/21/2024 0108 by Mercy Field RN  Outcome: Progressing     Problem: Safety - Adult  Goal: Free from fall injury  10/21/2024 0108 by Mercy Field RN  Outcome: Progressing     Problem: Cardiovascular - Adult  Goal: Maintains optimal cardiac output and hemodynamic stability  Outcome: Progressing  Goal: Absence of cardiac dysrhythmias or at baseline  Outcome: Progressing     Problem: Chronic Conditions and Co-morbidities  Goal: Patient's chronic conditions and co-morbidity symptoms are monitored and maintained or improved  10/21/2024 0108 by Mercy Field RN  Outcome: Progressing     Problem: Pain  Goal: Verbalizes/displays adequate comfort level or baseline comfort level  10/21/2024 0108 by Mercy Field RN  Outcome: Progressing     Problem: Metabolic/Fluid and Electrolytes - Adult  Goal: Electrolytes maintained within normal limits  Outcome: Progressing  Goal: Hemodynamic stability and optimal renal function maintained  Outcome: Progressing  Goal: Glucose maintained within prescribed range  Outcome: Progressing     Problem: Hematologic - Adult  Goal: Maintains hematologic stability  Outcome: Progressing

## 2024-10-21 NOTE — PROGRESS NOTES
Occupational Therapy  Facility/Department: [facility] ICU  Occupational Therapy Treatment    Name: Valery Ramirez  : 1950  MRN: [id number]  Date of Service: 10/21/2024    Discharge Recommendations:  24 hour supervision or assist, Home with Home health OT, Subacute/Skilled Nursing Facility          Patient Diagnosis(es): The primary encounter diagnosis was Nausea and vomiting, unspecified vomiting type. Diagnoses of Generalized abdominal pain, PAD (peripheral artery disease) (HCC), Atherosclerosis of arteries of extremities (HCC), and Hypotension due to hypovolemia were also pertinent to this visit.  Past Medical History:  has a past medical history of CHF (congestive heart failure) (HCC), COPD (chronic obstructive pulmonary disease) (HCC), Dysphagia, GI bleed, Gout, History of hemodialysis, Hyperlipidemia, Hypertension, Polyp of colon, Renal failure, and Voice disorder.  Past Surgical History:  has a past surgical history that includes Dialysis fistula creation; Total knee arthroplasty (Bilateral); Carpal tunnel release (Bilateral); Cholecystectomy; UPPP; Coronary angioplasty with stent; Enteroscopy (N/A, 2018); Upper gastrointestinal endoscopy (N/A, 2023); Upper gastrointestinal endoscopy (N/A, 2024); and Dialysis fistula creation (Right, 2024).    Treatment Diagnosis: Impaired ADL and functional mobility      Assessment  Performance deficits / Impairments: Decreased functional mobility ;Decreased ADL status;Decreased balance;Decreased endurance  Assessment: Pt is from home alone and very adamant about return home at discharge. Pt requiring CGA for all transfers and functional mobility with RW. ADL grooming completed in stance at sink with CGA and toileting completed with CGA. If pt discharges home will need 24hr assist and HHOT. If 24hr assist is not availible then would recommend inpt OT. Continue OT per POC.    Treatment Diagnosis: Impaired ADL and functional mobility  Prognosis:  is needed for bathing (which includes washing, rinsing, drying)?: A Little  How much help is needed for toileting (which includes using toilet, bedpan, or urinal)?: A Little  How much help is needed for putting on and taking off regular upper body clothing?: A Little  How much help is needed for taking care of personal grooming?: A Little  How much help for eating meals?: None  AM-[facility] Inpatient Daily Activity Raw Score: 18  AM-PAC Inpatient ADL T-Scale Score : 38.66  ADL Inpatient CMS 0-100% Score: 46.65  ADL Inpatient CMS G-Code Modifier : CK         Goals  Short Term Goals  Time Frame for Short Term Goals: Discharge  Short Term Goal 1: Transfer to/from toilet with supervision - ongoing  Short Term Goal 2: Stance with supervision x5 min while enaging in ADL - ongoing  Short Term Goal 3: Lower body dressing with supervision - ongoing  Patient Goals   Patient goals : Go home      Therapy Time   Individual Concurrent Group Co-treatment   Time In 1427         Time Out 1524         Minutes 57         Timed Code Treatment Minutes: 57 Minutes       NO De Leon/RUBIA

## 2024-10-21 NOTE — PROGRESS NOTES
Pts China romeo, would draw blood if pressure applied onsite. ICU residents made aware.     Labs sent. NPO at [facility]. For RLE Arteriogram (8am). Ancef 1 grm on call to OR.

## 2024-10-22 LAB
ALBUMIN SERPL-MCNC: 3.3 G/DL (ref 3.4–5)
ANION GAP SERPL CALCULATED.3IONS-SCNC: 11 MMOL/L (ref 3–16)
BACTERIA UR CULT: NORMAL
BASOPHILS # BLD: 0 K/UL (ref 0–0.2)
BASOPHILS NFR BLD: 0.3 %
BASOPHILS NFR BLD: 0.4 %
BASOPHILS NFR BLD: 0.6 %
BUN SERPL-MCNC: 29 MG/DL (ref 7–20)
CALCIUM SERPL-MCNC: 9.7 MG/DL (ref 8.3–10.6)
CHLORIDE SERPL-SCNC: 97 MMOL/L (ref 99–110)
CO2 SERPL-SCNC: 26 MMOL/L (ref 21–32)
CREAT SERPL-MCNC: 5.1 MG/DL (ref 0.6–1.2)
DEPRECATED RDW RBC AUTO: 16.7 % (ref 12.4–15.4)
DEPRECATED RDW RBC AUTO: 16.8 % (ref 12.4–15.4)
DEPRECATED RDW RBC AUTO: 16.9 % (ref 12.4–15.4)
EMERGENCY ISSUE BLOOD PRODUCTS SIGNED FORM: NORMAL
EOSINOPHIL # BLD: 0 K/UL (ref 0–0.6)
EOSINOPHIL # BLD: 0.1 K/UL (ref 0–0.6)
EOSINOPHIL # BLD: 0.1 K/UL (ref 0–0.6)
EOSINOPHIL NFR BLD: 1.1 %
EOSINOPHIL NFR BLD: 1.5 %
EOSINOPHIL NFR BLD: 1.6 %
GFR SERPLBLD CREATININE-BSD FMLA CKD-EPI: 8 ML/MIN/{1.73_M2}
GLUCOSE BLD-MCNC: 100 MG/DL (ref 70–99)
GLUCOSE BLD-MCNC: 101 MG/DL (ref 70–99)
GLUCOSE BLD-MCNC: 105 MG/DL (ref 70–99)
GLUCOSE BLD-MCNC: 107 MG/DL (ref 70–99)
GLUCOSE BLD-MCNC: 99 MG/DL (ref 70–99)
GLUCOSE SERPL-MCNC: 89 MG/DL (ref 70–99)
HCT VFR BLD AUTO: 22.7 % (ref 36–48)
HCT VFR BLD AUTO: 23 % (ref 36–48)
HCT VFR BLD AUTO: 23.3 % (ref 36–48)
HCT VFR BLD AUTO: 24.3 % (ref 36–48)
HGB BLD-MCNC: 7.4 G/DL (ref 12–16)
HGB BLD-MCNC: 7.5 G/DL (ref 12–16)
HGB BLD-MCNC: 7.7 G/DL (ref 12–16)
HGB BLD-MCNC: 7.7 G/DL (ref 12–16)
HGB BLD-MCNC: 7.8 G/DL (ref 12–16)
HGB BLD-MCNC: 8 G/DL (ref 12–16)
LYMPHOCYTES # BLD: 0.2 K/UL (ref 1–5.1)
LYMPHOCYTES # BLD: 0.3 K/UL (ref 1–5.1)
LYMPHOCYTES # BLD: 0.3 K/UL (ref 1–5.1)
LYMPHOCYTES NFR BLD: 6.2 %
LYMPHOCYTES NFR BLD: 7.3 %
LYMPHOCYTES NFR BLD: 7.4 %
MAGNESIUM SERPL-MCNC: 2 MG/DL (ref 1.8–2.4)
MCH RBC QN AUTO: 30.7 PG (ref 26–34)
MCH RBC QN AUTO: 31.2 PG (ref 26–34)
MCH RBC QN AUTO: 31.4 PG (ref 26–34)
MCHC RBC AUTO-ENTMCNC: 32.7 G/DL (ref 31–36)
MCHC RBC AUTO-ENTMCNC: 33 G/DL (ref 31–36)
MCHC RBC AUTO-ENTMCNC: 33.3 G/DL (ref 31–36)
MCV RBC AUTO: 93.9 FL (ref 80–100)
MCV RBC AUTO: 94.2 FL (ref 80–100)
MCV RBC AUTO: 94.6 FL (ref 80–100)
MONOCYTES # BLD: 0.4 K/UL (ref 0–1.3)
MONOCYTES NFR BLD: 10.5 %
MONOCYTES NFR BLD: 9.5 %
MONOCYTES NFR BLD: 9.9 %
NEUTROPHILS # BLD: 2.9 K/UL (ref 1.7–7.7)
NEUTROPHILS # BLD: 3.1 K/UL (ref 1.7–7.7)
NEUTROPHILS # BLD: 3.3 K/UL (ref 1.7–7.7)
NEUTROPHILS NFR BLD: 80.4 %
NEUTROPHILS NFR BLD: 80.5 %
NEUTROPHILS NFR BLD: 82.8 %
PERFORMED ON: ABNORMAL
PERFORMED ON: NORMAL
PHOSPHATE SERPL-MCNC: 2.8 MG/DL (ref 2.5–4.9)
PLATELET # BLD AUTO: 68 K/UL (ref 135–450)
PLATELET # BLD AUTO: 81 K/UL (ref 135–450)
PLATELET # BLD AUTO: 85 K/UL (ref 135–450)
PMV BLD AUTO: 10.2 FL (ref 5–10.5)
PMV BLD AUTO: 10.3 FL (ref 5–10.5)
PMV BLD AUTO: 9.6 FL (ref 5–10.5)
POTASSIUM SERPL-SCNC: 4.3 MMOL/L (ref 3.5–5.1)
RBC # BLD AUTO: 2.42 M/UL (ref 4–5.2)
RBC # BLD AUTO: 2.47 M/UL (ref 4–5.2)
RBC # BLD AUTO: 2.57 M/UL (ref 4–5.2)
SODIUM SERPL-SCNC: 134 MMOL/L (ref 136–145)
WBC # BLD AUTO: 3.5 K/UL (ref 4–11)
WBC # BLD AUTO: 3.9 K/UL (ref 4–11)
WBC # BLD AUTO: 3.9 K/UL (ref 4–11)

## 2024-10-22 PROCEDURE — 6370000000 HC RX 637 (ALT 250 FOR IP)

## 2024-10-22 PROCEDURE — 99233 SBSQ HOSP IP/OBS HIGH 50: CPT | Performed by: INTERNAL MEDICINE

## 2024-10-22 PROCEDURE — 83735 ASSAY OF MAGNESIUM: CPT

## 2024-10-22 PROCEDURE — 2500000003 HC RX 250 WO HCPCS

## 2024-10-22 PROCEDURE — 6370000000 HC RX 637 (ALT 250 FOR IP): Performed by: PHYSICIAN ASSISTANT

## 2024-10-22 PROCEDURE — 85018 HEMOGLOBIN: CPT

## 2024-10-22 PROCEDURE — 85025 COMPLETE CBC W/AUTO DIFF WBC: CPT

## 2024-10-22 PROCEDURE — 85014 HEMATOCRIT: CPT

## 2024-10-22 PROCEDURE — 2060000000 HC ICU INTERMEDIATE R&B

## 2024-10-22 PROCEDURE — 36592 COLLECT BLOOD FROM PICC: CPT

## 2024-10-22 PROCEDURE — 6370000000 HC RX 637 (ALT 250 FOR IP): Performed by: SURGERY

## 2024-10-22 PROCEDURE — 80069 RENAL FUNCTION PANEL: CPT

## 2024-10-22 PROCEDURE — 2580000003 HC RX 258

## 2024-10-22 PROCEDURE — 6370000000 HC RX 637 (ALT 250 FOR IP): Performed by: INTERNAL MEDICINE

## 2024-10-22 PROCEDURE — 36415 COLL VENOUS BLD VENIPUNCTURE: CPT

## 2024-10-22 PROCEDURE — 97110 THERAPEUTIC EXERCISES: CPT

## 2024-10-22 PROCEDURE — 97530 THERAPEUTIC ACTIVITIES: CPT

## 2024-10-22 RX ORDER — AMLODIPINE BESYLATE 10 MG/1
10 TABLET ORAL DAILY
Status: DISCONTINUED | OUTPATIENT
Start: 2024-10-22 | End: 2024-10-23 | Stop reason: HOSPADM

## 2024-10-22 RX ADMIN — AMLODIPINE BESYLATE 10 MG: 10 TABLET ORAL at 11:53

## 2024-10-22 RX ADMIN — CALCIUM ACETATE 667 MG: 667 CAPSULE ORAL at 09:35

## 2024-10-22 RX ADMIN — SODIUM CHLORIDE, PRESERVATIVE FREE 10 ML: 5 INJECTION INTRAVENOUS at 20:31

## 2024-10-22 RX ADMIN — RIVAROXABAN 2.5 MG: 2.5 TABLET, FILM COATED ORAL at 20:30

## 2024-10-22 RX ADMIN — CALCIUM ACETATE 667 MG: 667 CAPSULE ORAL at 14:40

## 2024-10-22 RX ADMIN — CALCIUM ACETATE 667 MG: 667 CAPSULE ORAL at 20:30

## 2024-10-22 RX ADMIN — SODIUM CHLORIDE, PRESERVATIVE FREE 10 ML: 5 INJECTION INTRAVENOUS at 09:39

## 2024-10-22 RX ADMIN — BACITRACIN: 500 OINTMENT TOPICAL at 20:32

## 2024-10-22 RX ADMIN — ALLOPURINOL 200 MG: 100 TABLET ORAL at 09:35

## 2024-10-22 RX ADMIN — BACITRACIN: 500 OINTMENT TOPICAL at 09:39

## 2024-10-22 RX ADMIN — ASPIRIN 81 MG: 81 TABLET, COATED ORAL at 09:35

## 2024-10-22 RX ADMIN — CALCITRIOL CAPSULES 0.25 MCG 0.5 MCG: 0.25 CAPSULE ORAL at 09:35

## 2024-10-22 RX ADMIN — BACITRACIN: 500 OINTMENT TOPICAL at 14:40

## 2024-10-22 RX ADMIN — PANTOPRAZOLE SODIUM 40 MG: 40 TABLET, DELAYED RELEASE ORAL at 06:18

## 2024-10-22 RX ADMIN — ATORVASTATIN CALCIUM 80 MG: 80 TABLET, FILM COATED ORAL at 20:31

## 2024-10-22 NOTE — PROGRESS NOTES
Pt with orders to remove left femoral CVC per Dr. Tomas. Fem CVC pulled by this RN and Vaseline gauze immediately applied over site. Visual inspection of CVC by RN- tip intact. Manual pressure held by RN until hemostasis achieved. Vaseline gauze and Tegaderm dressing in place. Pt tolerated procedure well.

## 2024-10-22 NOTE — PROGRESS NOTES
4 Eyes Skin Assessment     NAME:  Valery Ramirez  YOB: 1950  MEDICAL RECORD NUMBER:  [id number]    The patient is being assessed for  Transfer to New Unit    I agree that at least one RN has performed a thorough Head to Toe Skin Assessment on the patient. ALL assessment sites listed below have been assessed.      Areas assessed by both nurses:    Head, Face, Ears, Shoulders, Back, Chest, Arms, Elbows, Hands, Sacrum. Buttock, Coccyx, Ischium, Legs. Feet and Heels, and Under Medical Devices         Does the Patient have a Wound? Yes wound(s) were present on assessment. LDA wound assessment was Initiated and completed by RN  - Nonblanchable coccyx  - black/purple R toe        Jeremiah Prevention initiated by RN: Yes  Wound Care Orders initiated by RN: Yes    Pressure Injury (Stage 3,4, Unstageable, DTI, NWPT, and Complex wounds) if present, place Wound referral order by RN under : No    New Ostomies, if present place, Ostomy referral order under : No     Nurse 1 eSignature: Electronically signed by Mitzy Esquivel RN on 10/22/24 at 1:44 AM EDT    **SHARE this note so that the co-signing nurse can place an eSignature**    Nurse 2 eSignature: Electronically signed by Janelle Moscoso RN on 10/22/24 at 6:19 AM EDT

## 2024-10-22 NOTE — PROGRESS NOTES
Pts AM labs resulted:     Creatinine: 5.1   Pt receives Dialysis MWF     This RN messaged Consuelo Renteria-Judy to make her aware. NP expressed that dialysis is off a day d/t surgery. Plan of care on going.

## 2024-10-22 NOTE — PROGRESS NOTES
Consulted for coccyx - stage 1 and right toe purple/black discoloration. Wound care orders placed for coccyx. Per Vascular no surgical intervention d/t pt's severe multiple co-morbidities. No wound care options, wound will not heal d/t poor vascular. Wound Care to sign off; re-consult for changes or deterioration.

## 2024-10-22 NOTE — PROGRESS NOTES
V2.0    [facility] Progress Note      Name:  Valery Ramirez /Age/Sex: 1950  (74 y.o. female)   MRN & CSN:  [id number] & [id number] Encounter Date/Time: 10/22/2024 8:16 AM EDT   Location:  [facility]/3524-01 PCP: Tori Mora MD (Inactive)     Attending:Amanda Tomas MD       Hospital Day: 9    HPI:    Assessment and Recommendations   This is a 74-year-old female with medical history significant for ESRD on hemodialysis MWF, type II DM, essential hypertension, hyperlipidemia, gout, dysphagia and voice disorder, COPD, HFpEF, severe peripheral artery disease presented with complaints of nausea, vomiting, diarrhea with abdominal pain as well as worsening right lower extremity pain over the past week. JERRICA negative for blood. She was admitted for concerns of right lower extremity ischemia and gastroenteritis.     Hospital course:    Valery Ramirez is a 74 y.o. female with pmh of MDD, diabetes, hypertension, hyperlipidemia, gout, CAD, heart failure preserved EF peripheral vascular disease, Morse cirrhosis, Plavix and Xarelto who presents with Abdominal pain , diarrhea with bright red clots and right lower leg pain.  In the ED patient was found to have an ischemic right leg .  CT shows severe stenosis of the right femoral artery superficial femoral artery occlusion of the renal artery and stenosis of the left renal artery.  Initially treated with a heparin drip.  Arterial Doppler done on 10/15 shows with no blood flow.  Patient underwent angiogram but amenable to intervention.  Needs bypass but given her morbidity and mortality vascular recommended conservative surgery and palliative care.  POA spoke to palliative care.  Patient is a DNR CCA.      Plan:     Right lower extremity ischemia-takes low-dose of Xarelto 2.5 mg twice daily at home.  Discussed with pharmacy.  Will reorder it and monitor H&H  -Arterial Doppler showed complete occlusion, underwent angiogram with no intervention needs a bypass but

## 2024-10-22 NOTE — PROGRESS NOTES
Nephrology Progress Note                                                                                                                                                                                                                                                                                                                                                               Office : [phone]     Fax :[phone]    Patient's Name: Valery Ramirez    Reason for Consult:  ESRD on HD  Requesting Physician:  Tori Mora MD (Inactive)  Chief Complaint:    Chief Complaint   Patient presents with    Diarrhea     Pt reports bloody diarrhea since Wednesday.     Nausea    Emesis       Assessment/Plan     # ESRD on HD   - Dialyzes on a MWF schedule  - Anticipate next HD tomorrow   - Renally dose meds for ESRD  - Monitor renal labs     # R lower limb ischemia   - S/p bilateral runoff angiogram 9/27 with extensive calcifications  - Bilateral arterial - complete occlusion with no blood flow to the right   - Vascular surgery consulted - started on Heparin gtt   - S/p RLE arteriogram 10/21    # HTN  - BP's higher today - resume CCB  - Monitor     # Anemia of chronic disease  - Hgb trending down. Added UMBERTO with HD   - Monitor     # Acid- base/ Electrolyte imbalance   - Lytes stable  - Monitor     # MBD management  - Cont home Calcitriol 0.5 mcg daily   - Phoslo with meals   - Low phos diet     # DM2   - Insulin management per primary     # CHF  - Cont vol management with HD   - Attempt UF as able - limited thus far by BP    # Worsening abdominal pain - Resolved   - Felt to be viral in etiology  - Symptomatic management for now         History of Present Ilness:    Valery Ramirez is a 74 y.o. female with a PMH of ESRD on HD, HTN, HLD, COPD, CHF, gout, DM2, h/o GI bleed, who presents to the hospital for one week of increased abdominal pain and bloody diarrhea. Patient also noted over the last week that her right

## 2024-10-22 NOTE — PLAN OF CARE
Problem: Skin/Tissue Integrity  Goal: Absence of new skin breakdown  Description: 1.  Monitor for areas of redness and/or skin breakdown  2.  Assess vascular access sites hourly  3.  Every 4-6 hours minimum:  Change oxygen saturation probe site  4.  Every 4-6 hours:  If on nasal continuous positive airway pressure, respiratory therapy assess nares and determine need for appliance change or resting period.  10/22/2024 1515 by Leela Jenkins, RN  Outcome: Progressing  Note: Patient's skin monitored throughout shift. Interventions used when necessary. Making sure patient is turning themselves or being turned throughout shift. Patient placed on a specialty bed this shift.      Problem: Safety - Adult  Goal: Free from fall injury  10/22/2024 1515 by Leela Jenkins RN  Outcome: Progressing  Flowsheets (Taken 10/22/2024 1515)  Free From Fall Injury:   Instruct family/caregiver on patient safety   Based on caregiver fall risk screen, instruct family/caregiver to ask for assistance with transferring infant if caregiver noted to have fall risk factors  Note: Call light with in reach, bed in lowest position, bed alarm on, bed and chair wheels locked, bed side table within reach, non-skid socks on.      Problem: Pain  Goal: Verbalizes/displays adequate comfort level or baseline comfort level  10/22/2024 1515 by Leela Jenkins, RN  Outcome: Progressing  Flowsheets (Taken 10/22/2024 1515)  Verbalizes/displays adequate comfort level or baseline comfort level:   Encourage patient to monitor pain and request assistance   Administer analgesics based on type and severity of pain and evaluate response   Consider cultural and social influences on pain and pain management   Assess pain using appropriate pain scale   Implement non-pharmacological measures as appropriate and evaluate response   Notify Licensed Independent Practitioner if interventions unsuccessful or patient reports new pain  Note: Patient monitored for pain  throughout shift. Interventions used when necessary.

## 2024-10-22 NOTE — PROGRESS NOTES
Patient transferred to [facility] via bed from ICU.  Patient is alert and oriented to room including call light and bed controls. Patient is a high fall risk. Safety measures instituted per policy.     Patient oriented to unit policies and procedures including: pain management practices, unit safety precautions, family rapid response, q4h vital signs and assessments, daily 4am lab draws,and routine central line care.  Also discussed use of call light and how to get in touch with nursing staff.  Stressed the importance of calling out immediately for any changes in condition including but not limited to: pain, chills, fever, nausea, vomiting, diarrhea, chest pain, sob/perez, assistance with toileting, bleeding, or any other symptoms that are out of the ordinary for the patient.  Patient verbalizes understanding of all instructions and will call for assistance as needed.

## 2024-10-22 NOTE — PLAN OF CARE
Problem: Safety - Adult  Goal: Free from fall injury  10/22/2024 0515 by Mitzy Esquivel, RN  Outcome: Progressing  Pt is a High fall risk.   Explained fall risk precautions to pt and rationale behind their use to keep the patient safe. Belongings are in reach. Pt encouraged to notify staff for any and all assistance. Staff present in tx suite throughout entirety of pts treatment to monitor and protect from falls.  Assistance provided when pivoting to the bedside commode.     Problem: Pain  Goal: Verbalizes/displays adequate comfort level or baseline comfort level  10/22/2024 0515 by Mitzy Esquivel, RN  Outcome: Progressing  Pt had no complaints of pain throughout the shift     Problem: Hematologic - Adult  Goal: Maintains hematologic stability  Outcome: Progressing  Patient's hemoglobin this AM:   Recent Labs     10/22/24  0312   HGB 7.4*     Patient's platelet count this AM:   Recent Labs     10/22/24  0312   PLT 81*    Thrombocytopenia Precautions in place.  Patient showing no signs or symptoms of active bleeding.

## 2024-10-22 NOTE — CARE COORDINATION
Addendum at 10:12am: Received call from CM yesterday stating granddanisughter called her inquiring update.     Spoke with RN who confirmed pt is alert/oriented and stated granddaughter wanted SNF.    Spoke with pt at bedside. Discussed recommendations for home with 24/7 and how this would be private pay or provided by family. Discussed SNF as an option and she refused stating she is ONLY returning home. She said her neighbor would transport her to HD and she is agreeable to [facility]. She is aware writer would call Marsha but she should call her too.    Called and spoke with Marsha, martineughter/POA. Explained pt is only wanting home and would have to be agreeable to SNF. Marsha states she knew pt wouldn't be going to a SNF and only wanting home which she is okay with this. She said she will be checking on her once a week and is working on getting other supports. Marsha stated she is fine with pt having support as needed as she was independent prior to admission and driving self to HD. Marsha confirmed pt's neighbor would assist with transportation to/from HD and is a good support. Marsha expressed no concerns with pt returning home and will pick her up on dicharge    9:23am: Chart review completed and spoke with RN.     Called [facility] and spoke with Jeannie at ([phone]) who confirmed they got the fax yesterday by . Jeannie confirmed can accept for home care services.     FELICIA [place]   for [facility] ([facility])  [employer] Mobile: [phone]

## 2024-10-22 NOTE — PROGRESS NOTES
Dwight soler this is Dr. Echols on-call for the [facility] palliative Care Chart Review  and Check in Note:     NAME:  Valery Ramirez  Admit Date: 10/14/2024  Hospital Day:  Hospital Day: 9   Current Code status: DNR-CCA    Palliative care is continuing to following Ms. Ramirez for symptom management,  and goals of care discussion as needed. Patient's chart reviewed today 10/22/24.      Overnight Ms. Ramirez was transferred to the floors. PT evaluated her and recommended home w/ home health PT, 24 hour supervision or assist.     This morning, Valery states she really wants to go home.  She has no complaints except for the fact that she does not want to be in the hospital and only wants to be home.  She is adamant that she will be going home today.  Otherwise, she denies pain, shortness of breath, or other complaints at this time.     On speaking with Marsha, they have already established home health with assistance of CM. She is also anxious to help Valery go home.     The following are the currently established goals/code status, and Symptom management.     Goals of care:  pt wants to go home as soon as possible. Spoke with Marsha who concurs and has established home health to help Valery with 24 hr care.     Code status: DNR-CCA     Discharge plan: home w/ home PT when medically stable for d/c.     Jennifer La MD PGY-2  10/22/24  10:12 AM

## 2024-10-22 NOTE — PROGRESS NOTES
Order for CVC Femoral Line to be removed in ICU yesterday 10/21/2024. Patient transferred to [facility] in the evening on 10/21/2024. Messaged Thom Patel MD at 0947 about placing new order for CVC femoral removal. Awaiting orders at this time.

## 2024-10-22 NOTE — PROGRESS NOTES
Hospital Medicine Progress Note      Date of Admission: 10/14/2024  Hospital Day: 8    Chief Admission Complaint: Right lower extremity pain and abdominal pain     Subjective: Patient seen and examined at bedside today.  Has mild discomfort in her chest but otherwise denies any complaints.  No acute events reported overnight.  Patient had angiogram of the right lower extremity today with no interventions.  Blood pressure remains soft    Presenting Admission History:       This is a 74-year-old female with medical history significant for ESRD on hemodialysis MWF, type II DM, essential hypertension, hyperlipidemia, gout, dysphagia and voice disorder, COPD, HFpEF, severe peripheral artery disease presented with complaints of nausea, vomiting, diarrhea with abdominal pain as well as worsening right lower extremity pain over the past week.  JERRICA negative for blood.  She was admitted for concerns of right lower extremity ischemia and gastroenteritis.    Assessment/Plan:          Right lower extremity ischemia  Bilateral lower extremity arterial Doppler done 10/15 preliminary concerning for complete occlusion with no blood flow to the right.  Vascular surgery consulted, patient underwent angiogram today with no interventions done.  Patient has extensive disease and would need open bypass but given her risk of morbidity and mortality conservative management has been determined.  Vascular recommended palliative, with patient and POA declining any palliative at this time.  Recommend starting aspirin only and continue statin  Neurovascular check every 4 hours  PT/OT recommend home with home PT.  Home health care ordered.  Plan for discharge tomorrow if hemodynamically stable with DME supplies    Hematoma of the right chest 2/2 pain on heparin for RLE ischemia while awaiting angiogram  Hemorrhagic shock 2/2 above, resolved  Denies any trauma.  CTA chest 10/19 revealed large hematoma over the right breast suggestive of active  discharge plan in detail with case management  including timing/barriers to discharge, need for support services and/or placement decision   [x] Discussed management of the case with: Vascular surgery, granddaughter        Medications:  Personally reviewed in detail in conjunction w/ labs as documented for evidence of drug toxicity.     Infusion Medications    sodium chloride      sodium chloride      sodium chloride      dextrose      sodium chloride       Scheduled Medications    bacitracin   Topical TID    epoetin kuldeep-epbx  6,000 Units IntraVENous Once per day on Monday Wednesday Friday    pantoprazole  40 mg Oral QAM AC    allopurinol  200 mg Oral Daily    calcium acetate  1 capsule Oral TID    [Held by provider] amLODIPine  10 mg Oral Daily    atorvastatin  80 mg Oral Nightly    calcitRIOL  0.5 mcg Oral Daily    FLUoxetine  40 mg Oral Daily    [Held by provider] metoprolol succinate  50 mg Oral BID    sodium chloride flush  5-40 mL IntraVENous 2 times per day    insulin lispro  0-4 Units SubCUTAneous 4x Daily AC & HS    aspirin  81 mg Oral Daily     PRN Meds: sodium chloride, sodium chloride, sodium chloride, calcium carbonate, acetaminophen, albumin human 25%, HYDROmorphone, glucose, dextrose bolus **OR** dextrose bolus, glucagon (rDNA), dextrose, sodium chloride flush, sodium chloride, ondansetron **OR** ondansetron     Labs:  Personally reviewed and interpreted for clinical significance.     Recent Labs     10/20/24  1050 10/20/24  1600 10/20/24  2337 10/21/24  0326 10/21/24  1746   WBC 5.3  --  5.4 7.9  --    HGB 8.5*   < > 8.1* 8.5* 8.0*   HCT 26.0*   < > 24.6* 25.6* 24.3*   PLT 98*  --  114* 116*  --     < > = values in this interval not displayed.     Recent Labs     10/20/24  0603 10/21/24  0326 10/21/24  1746   *  133* 133* 133*   K 4.5  4.5 4.2 4.3   CL 99  98* 98* 95*   CO2 24 24 24 24   BUN 27*  26* 17 24*   CREATININE 5.3*  4.9* 4.0* 4.8*   CALCIUM 9.2  9.1 8.9 9.4   MG  --  1.90  --

## 2024-10-22 NOTE — PROGRESS NOTES
4 Eyes Admission Assessment     I agree as the admission nurse that 2 RN's have performed a thorough Head to Toe Skin Assessment on the patient. ALL assessment sites listed below have been assessed on admission.       Areas assessed by both nurses:   [x]   Head, Face, and Ears   [x]   Shoulders, Back, and Chest  [x]   Arms, Elbows, and Hands   [x]   Coccyx, Sacrum, and Ischium  [x]   Legs, Feet, and Heels        Does the Patient have Skin Breakdown?  Yes a wound was noted on the Admission Assessment and an LDA was Initiated documentation include the Mariangel-wound, Wound Assessment, Measurements, Dressing Treatment, Drainage, and Color\",         Jeremiah Prevention initiated:  Yes   Wound Care Orders initiated:  Yes      WOC nurse consulted for Pressure Injury (Stage 3,4, Unstageable, DTI, NWPT, and Complex wounds) or Jeremiah score 18 or lower:  No      Nurse 1 eSignature: Electronically signed by Leela Jenkins RN on 10/22/24 at 6:33 PM EDT    **SHARE this note so that the co-signing nurse is able to place an eSignature**    Nurse 2 eSignature: Electronically signed by Mitzy Esquivel RN on 10/22/24 at 11:43 PM EDT

## 2024-10-22 NOTE — PROGRESS NOTES
Occupational Therapy  Occupational Therapy  Daily Treatment Note  Patient Name: Valery Ramirez  MRN: [id number]    Chart Reviewed: Yes       Other position/activity restrictions: up with assist     Additional Pertinent Hx: Pt w/ a hx of medical history significant for ESRD on hemodialysis MWF, type II DM, essential hypertension, hyperlipidemia, gout, dysphagia and voice disorder, COPD, HFpEF, severe peripheral artery disease. She was admitted for concerns of right lower extremity ischemia and gastroenteritis. ICU team was called 10/19 to bedside because patient was possibly hypotensive. BP cuff was not able to get a reading, so pressure was estimated using doppler, with an estimated SBP in the 70's. Patient has been on heparin gtt since admission in preparation of surgical intervention for RLE vascular disease. She developed a hematoma on her left chest about one hour prior to this rapid being called. Her recent hgb was 5.9. 2 untis of pRBCs had already been ordered but had not yet started transfusing.US soft tissue: revealed Large 14 cm hematoma over the right chest.  CTA chest revealed: Large hematoma over the right breast with areas suggestive for active active intravasation, LE Angiogram 10/21:Severe distal right external iliac stenosis and common femoral artery stenosis.  Total occlusion of the right superficial femoral artery        Diagnosis: Abd pain  Treatment Diagnosis: Impaired ADL and functional mobility    Subjective: Pt supine in bed upon entry, pleasant and agreeable to therapy session.    Pain: Denies    Social/Functional History  Lives With: Alone  Type of Home: Apartment (ground floor)  Home Layout: One level  Home Access: Level entry  Bathroom Shower/Tub: Walk-in shower  Bathroom Toilet: Handicap height  Bathroom Equipment: Grab bars around toilet, Grab bars in shower, Shower chair, Hand-held shower  Home Equipment: Cane, Walker - 4-Wheeled, Alert button, Oxygen, Hospital bed, Lift chair,

## 2024-10-23 VITALS
TEMPERATURE: 98.6 F | HEART RATE: 70 BPM | DIASTOLIC BLOOD PRESSURE: 56 MMHG | SYSTOLIC BLOOD PRESSURE: 156 MMHG | RESPIRATION RATE: 18 BRPM | BODY MASS INDEX: 24.58 KG/M2 | OXYGEN SATURATION: 95 % | WEIGHT: 143.96 LBS | HEIGHT: 64 IN

## 2024-10-23 LAB
BASOPHILS # BLD: 0 K/UL (ref 0–0.2)
BASOPHILS NFR BLD: 0.4 %
DEPRECATED RDW RBC AUTO: 17.2 % (ref 12.4–15.4)
EOSINOPHIL # BLD: 0.1 K/UL (ref 0–0.6)
EOSINOPHIL NFR BLD: 1.3 %
GLUCOSE BLD-MCNC: 100 MG/DL (ref 70–99)
GLUCOSE BLD-MCNC: 103 MG/DL (ref 70–99)
GLUCOSE BLD-MCNC: 97 MG/DL (ref 70–99)
HCT VFR BLD AUTO: 24.8 % (ref 36–48)
HGB BLD-MCNC: 8 G/DL (ref 12–16)
LYMPHOCYTES # BLD: 0.2 K/UL (ref 1–5.1)
LYMPHOCYTES NFR BLD: 5.7 %
MCH RBC QN AUTO: 30.8 PG (ref 26–34)
MCHC RBC AUTO-ENTMCNC: 32.2 G/DL (ref 31–36)
MCV RBC AUTO: 95.6 FL (ref 80–100)
MONOCYTES # BLD: 0.4 K/UL (ref 0–1.3)
MONOCYTES NFR BLD: 9.2 %
NEUTROPHILS # BLD: 3.2 K/UL (ref 1.7–7.7)
NEUTROPHILS NFR BLD: 83.4 %
PERFORMED ON: ABNORMAL
PERFORMED ON: ABNORMAL
PERFORMED ON: NORMAL
PLATELET # BLD AUTO: 96 K/UL (ref 135–450)
PMV BLD AUTO: 9.6 FL (ref 5–10.5)
RBC # BLD AUTO: 2.59 M/UL (ref 4–5.2)
WBC # BLD AUTO: 3.9 K/UL (ref 4–11)

## 2024-10-23 PROCEDURE — 6370000000 HC RX 637 (ALT 250 FOR IP): Performed by: PHYSICIAN ASSISTANT

## 2024-10-23 PROCEDURE — 6370000000 HC RX 637 (ALT 250 FOR IP)

## 2024-10-23 PROCEDURE — 6370000000 HC RX 637 (ALT 250 FOR IP): Performed by: INTERNAL MEDICINE

## 2024-10-23 PROCEDURE — 2580000003 HC RX 258

## 2024-10-23 PROCEDURE — 6370000000 HC RX 637 (ALT 250 FOR IP): Performed by: SURGERY

## 2024-10-23 PROCEDURE — 2500000003 HC RX 250 WO HCPCS

## 2024-10-23 PROCEDURE — 99233 SBSQ HOSP IP/OBS HIGH 50: CPT | Performed by: INTERNAL MEDICINE

## 2024-10-23 PROCEDURE — 85025 COMPLETE CBC W/AUTO DIFF WBC: CPT

## 2024-10-23 PROCEDURE — 36415 COLL VENOUS BLD VENIPUNCTURE: CPT

## 2024-10-23 RX ORDER — AMLODIPINE BESYLATE 10 MG/1
10 TABLET ORAL DAILY
COMMUNITY

## 2024-10-23 RX ORDER — RIVAROXABAN 2.5 MG/1
2.5 TABLET, FILM COATED ORAL 2 TIMES DAILY
COMMUNITY

## 2024-10-23 RX ADMIN — AMLODIPINE BESYLATE 10 MG: 10 TABLET ORAL at 09:37

## 2024-10-23 RX ADMIN — ALLOPURINOL 200 MG: 100 TABLET ORAL at 09:37

## 2024-10-23 RX ADMIN — CALCITRIOL CAPSULES 0.25 MCG 0.5 MCG: 0.25 CAPSULE ORAL at 09:37

## 2024-10-23 RX ADMIN — RIVAROXABAN 2.5 MG: 2.5 TABLET, FILM COATED ORAL at 09:42

## 2024-10-23 RX ADMIN — BACITRACIN: 500 OINTMENT TOPICAL at 09:39

## 2024-10-23 RX ADMIN — CALCIUM ACETATE 667 MG: 667 CAPSULE ORAL at 14:50

## 2024-10-23 RX ADMIN — CALCIUM ACETATE 667 MG: 667 CAPSULE ORAL at 09:37

## 2024-10-23 RX ADMIN — SODIUM CHLORIDE, PRESERVATIVE FREE 10 ML: 5 INJECTION INTRAVENOUS at 09:39

## 2024-10-23 RX ADMIN — ASPIRIN 81 MG: 81 TABLET, COATED ORAL at 09:37

## 2024-10-23 RX ADMIN — PANTOPRAZOLE SODIUM 40 MG: 40 TABLET, DELAYED RELEASE ORAL at 06:20

## 2024-10-23 RX ADMIN — METOPROLOL SUCCINATE 50 MG: 50 TABLET, EXTENDED RELEASE ORAL at 10:18

## 2024-10-23 NOTE — CARE COORDINATION
is available. (payment due at time of pick-up or delivery - cash, check, or card accepted)     Able to afford home medications/ co-pay costs: Yes    ADLS:  Current PT AM-PAC Score: 17 /24  Current OT AM-PAC Score: 18 /24    DISCHARGE Disposition: Home with Home Health Care: [facility]      IMM Completed:   Not Indicated due to: provided 10/22     Transportation:  Transportation PLAN for discharge: family   Mode of Transport: Private Car    Home Care:  Home Care ordered at discharge: Yes  Home Care Agency:  [facility]  Phone: [phone]  Fax: [phone]  Orders faxed: Yes    Durable Medical Equipment:  DME Provider: n/a    Home Oxygen and Respiratory Equipment:  Oxygen needed at discharge?: Not Indicated; 95% on room air per vitals in epic    Dialysis:  Dialysis patient: Yes    Dialysis Center:  [facility]   Address: [place] [address]  Phone: [phone]    Additional CM Notes: Discharge order noted.     Pt returning home today with [facility] and resumption of her outpatient HD. Pt declines SNF (see note from writer on 10/22/24). Marsha, granddaughter is coming to pick her up (see below)    Spoke with Arlene at [facility] who is aware of the discharge and confirmed acceptance. Arlene said she has the pt on their schedule to start care tomorrow. She stated nothing additional was needed. AVS faxed to her at [phone].    Called and spoke with Chaparrita at [facility] who is aware of pt's discharge and returning on Friday. She stated nothing was needed from writer.     Ariana GARCIA aware. No other needs identified     COVID Result:    Lab Results   Component Value Date/Time    COVID19 NOT DETECTED 10/14/2024 12:46 PM       The Plan for Transition of Care is related to the following treatment goals of Abdominal pain [R10.9]  Generalized abdominal pain [R10.84]  Atherosclerosis of arteries of extremities (HCC)  [I70.209]  PAD (peripheral artery disease) ([facility]) [I73.9]  Nausea and vomiting, unspecified vomiting type [R11.2]    Addendum at 11:47am: RN stated granddaughter called about home care.     Called and spoke with pt's granddaughter/healthcare REBEL Larson. She states she spoke with [facility] and they told her they didn't have the information. Explained will call again but they told writer that they had everything yesterday.    Spoke with Linda at [facility] who stated they have everything they needed but the referral wasn't processed yet. Jad states they have accepted pt but they don't have her scheduled yet. Linda states she will call [facility]. Return call placed to [facility] who states she spoke with the [facility] and aware of the discharge order. [facility] states she will be picking pt up.    10:26am: Spoke with pt at bedside this AM. She confirmed she will return home when able with [facility]. She is hopeful it is today.     NARGIS will follow    FELICIA [facility]   for [facility] ([facility])  [employer] Mobile: [phone]

## 2024-10-23 NOTE — DISCHARGE SUMMARY
units of PRBC and FFP.  Patient's hemoglobin is stable around 7-8.  Low-dose Xarelto which is the patient's home dose has been started at the time of discharge and hemoglobin is stable.  Needs outpatient follow-up with vascular surgery     Plan:      Right lower extremity ischemia-takes low-dose of Xarelto 2.5 mg twice daily at home.  Restarted Xarelto.  Hemoglobin stable at the time of discharge.  Patient will need outpatient follow-up with PCP and vascular surgery  -Arterial Doppler showed complete occlusion, underwent angiogram with no intervention needs a bypass but given her risk of morbidity mortality conservative management has been opted for conservative management  -CTA abdominal aorta with runoff on 10/14 showed extensive atherosclerotic disease of both extremities with severe stenosis of right femoral artery bilateral superficial femoral artery  -Status post angiogram on 10/21  -Was initially started on heparin drip on 10/16.  Held on 10/19 due to breast hematoma  -Continue aspirin and statin  -PT OT consulted     Hematoma of right chest and breast  -Hematoma stable after restarting Xarelto.  Hemoglobin stable  -CTA on 10/19 showed a large hematoma of the breast suggestive of active intravasation and ultrasound showed a 14 cm hematoma  -Patient got 3 units of PRBCs and FFP     Hypertension  -Blood pressure trending up.  Resume amlodipine.  With severe peripheral vascular disease liberalize blood pressure control     Abdominal pain with nausea and vomiting   -Concern for gastritis started on PPI     ESRD  -Dialysis Monday Wednesday Friday  -UMBERTO with hemodialysis     Diabetes type 2  -Last A1c 4.7  -Continue sliding scale insulin hypoglycemia protocol        Acute metabolic encephalopathy  -Some confusion initially.  CT head was unremarkable.  Patient was febrile at that time  -Blood culture no growth to date     UTI  -Finished antibiotic course        Cirrhosis with splenomegaly and portal hypertension    1000-20 MG-MCG Tabs  Generic drug: Calcium Carb-Cholecalciferol     calcium acetate 667 MG Caps capsule  Commonly known as: PHOSLO     FLUoxetine 40 MG capsule  Commonly known as: PROzac     pantoprazole 40 MG tablet  Commonly known as: PROTONIX  Take 1 tablet by mouth 2 times daily (before meals)     Renvela 800 MG tablet  Generic drug: sevelamer     sodium bicarbonate 650 MG tablet            STOP taking these medications      metoprolol succinate 50 MG extended release tablet  Commonly known as: TOPROL XL     spironolactone 50 MG tablet  Commonly known as: ALDACTONE     torsemide 100 MG tablet  Commonly known as: DEMADEX               Where to Get Your Medications        These medications were sent to [facility]/pharmacy #[id number] - [place], [place] - [address] [phone] - F [phone] [address] [address]      Phone: [phone]   pantoprazole 40 MG tablet        Objective Findings at Discharge:   BP (!) 156/56   Pulse 70   Temp 98.6 °F (37 °C) (Oral)   Resp 18   Ht 1.626 m (5' 4\")   Wt 65.3 kg (143 lb 15.4 oz)   SpO2 95%   BMI 24.71 kg/m²       Physical Exam:   General: NAD  Eyes: EOMI  ENT: neck supple  Cardiovascular: Regular rate.  Respiratory: Clear to auscultation  Gastrointestinal: Soft, non tender  Genitourinary: no suprapubic tenderness  Musculoskeletal: No edema  Skin: Bruising around the chest and upper abdomen  Neuro: Alert.  Psych: Mood appropriate.         Labs and Imaging   Invasive vascular procedure    Result Date: 10/21/2024  •  Right common femoral artery stenosis with dense calcification. •  Right superficial femoral artery-total occlusion. •  Diffuse tibial and pedal arch disease as described.     US SOFT TISSUE LIMITED AREA    Result Date: 10/19/2024  Reason: Soft tissue fullness of the right chest wall Ultrasound soft tissue limited area FINDINGS: 5.8 x 14 x 10 cm heterogeneous hypoechoic collection identified over the right chest wall. No appreciable color flow is

## 2024-10-23 NOTE — PROGRESS NOTES
4 Eyes Skin Assessment     NAME:  Valery Ramirez  YOB: 1950  MEDICAL RECORD NUMBER:  [id number]    The patient is being assessed for  Shift Handoff    I agree that at least one RN has performed a thorough Head to Toe Skin Assessment on the patient. ALL assessment sites listed below have been assessed.      Areas assessed by both nurses:    Head, Face, Ears, Shoulders, Back, Chest, Arms, Elbows, Hands, Sacrum. Buttock, Coccyx, Ischium, Legs. Feet and Heels, and Under Medical Devices         Does the Patient have a Wound? Yes wound(s) were present on assessment. LDA wound assessment was Initiated and completed by RN       Jeremiah Prevention initiated by RN: Yes  Wound Care Orders initiated by RN: Yes    Pressure Injury (Stage 3,4, Unstageable, DTI, NWPT, and Complex wounds) if present, place Wound referral order by RN under : No    New Ostomies, if present place, Ostomy referral order under : No     Nurse 1 eSignature: Electronically signed by Mitzy Esquivel RN on 10/22/24 at 11:45 PM EDT    **SHARE this note so that the co-signing nurse can place an eSignature**    Nurse 2 eSignature: Electronically signed by Leela Jenkins RN on 10/23/24 at 3:25 PM EDT

## 2024-10-23 NOTE — PROGRESS NOTES
Pts BP this evening was 173/67, pts HR is in the 60/70s. Looks like pt usually takes Metoprolol extended release 50 mg 2x daily but it has been on hold. Pt is asymptomatic. This RN messaged Lisa Mahoney to make her aware     NP asked \"Why is the metoprolol on hold? Any pain or anxiety\"     RN expressed that she knew at one point the pt was hypotensive due to possible hemorrhagic shock and was on Levophed. RN believes they stopped the Levo on 10/21. Pt has no pain or anxiety.     NP read message at 2054

## 2024-10-23 NOTE — PROGRESS NOTES
Nephrology Progress Note                                                                                                                                                                                                                                                                                                                                                               Office : [phone]     Fax :[phone]    Patient's Name: Valery Ramirez    Reason for Consult:  ESRD on HD  Requesting Physician:  Tori Mora MD (Inactive)  Chief Complaint:    Chief Complaint   Patient presents with    Diarrhea     Pt reports bloody diarrhea since Wednesday.     Nausea    Vomiting       Assessment/Plan     # ESRD on HD   - Dialyzes on a MWF schedule  - Anticipate HD today   - Renally dose meds for ESRD  - Monitor renal labs     # R lower limb ischemia   - S/p bilateral runoff angiogram 9/27 with extensive calcifications  - Bilateral arterial - complete occlusion with no blood flow to the right   - Vascular surgery consulted - s/p Heparin gtt   - S/p RLE arteriogram 10/21    # HTN  - BP's higher today - resumed CCB & BB  - Monitor     # Anemia of chronic disease  - Hgb trending down. Added UMBERTO with HD   - Monitor     # Acid- base/ Electrolyte imbalance   - Lytes stable  - Monitor     # MBD management  - Cont home Calcitriol 0.5 mcg daily   - Phoslo with meals   - Low phos diet     # DM2   - Insulin management per primary     # CHF  - Cont vol management with HD   - Attempt UF as able - limited thus far by BP    # Worsening abdominal pain - Resolved   - Felt to be viral in etiology  - Symptomatic management for now         History of Present Ilness:    Valery Ramirez is a 74 y.o. female with a PMH of ESRD on HD, HTN, HLD, COPD, CHF, gout, DM2, h/o GI bleed, who presents to the hospital for one week of increased abdominal pain and bloody diarrhea. Patient also noted over the last week that her right foot

## 2024-10-23 NOTE — PROGRESS NOTES
V2.0    [facility] [place]      Name:  Valery Ramirez /Age/Sex: 1950  (74 y.o. female)   MRN & CSN:  [id number] & [id number] Encounter Date/Time: 10/23/2024 8:16 AM EDT   Location:  [facility]/3524-01 PCP: Tori Mora MD (Inactive)     Attending:Amanda Tomas MD       Hospital Day: 10    HPI:    Assessment and Recommendations   This is a 74-year-old female with medical history significant for ESRD on hemodialysis MWF, type II DM, essential hypertension, hyperlipidemia, gout, dysphagia and voice disorder, COPD, HFpEF, severe peripheral artery disease presented with complaints of nausea, vomiting, diarrhea with abdominal pain as well as worsening right lower extremity pain over the past week. JERRICA negative for blood. She was admitted for concerns of right lower extremity ischemia and gastroenteritis.     Hospital course:    Valery Ramirez is a 74 y.o. female with pmh of MDD, diabetes, hypertension, hyperlipidemia, gout, CAD, heart failure preserved EF peripheral vascular disease, Morse cirrhosis, Plavix and Xarelto who presents with Abdominal pain , diarrhea with bright red clots and right lower leg pain.  In the ED patient was found to have an ischemic right leg .  CT shows severe stenosis of the right femoral artery superficial femoral artery occlusion of the renal artery and stenosis of the left renal artery.  Initially treated with a heparin drip.  Arterial Doppler done on 10/15 shows with no blood flow.  Patient underwent angiogram but it is not amenable to intervention.  Needs bypass but given her morbidity and mortality vascular recommended conservative surgery and palliative care.  POA spoke to palliative care.  Patient is a DNR CCA.  During the hospital course patient developed a large hematoma of the chest and breast, CT on 10/19 showed acute intravasation with a 14 cm hematoma resulting in hemorrhagic shock.  Patient was transferred to the ICU was started on pressors.  Patient  Kermit GREGORY spent > 55  minutes in the care of this patient.  Over 50% of that time was in face-to-face counseling regarding disease process, diagnostic testing, preventative measures, and answering patient and family questions.     Diet ADULT DIET; Regular; 3 carb choices (45 gm/meal)  ADULT ORAL NUTRITION SUPPLEMENT; Breakfast, Lunch, Dinner; Standard High Calorie/High Protein Oral Supplement   DVT Prophylaxis [] Lovenox, []  Heparin, [] SCDs, [] Ambulation,  [] Eliquis, [] Xarelto  [] Coumadin   Code Status DNR-CCA   Disposition    Surrogate Decision Maker/ POA         MDM      [x] High (any 2)    A. Problems (any 1)  [x] Acute/Chronic Illness/injury posing threat to life or bodily function:    [] Severe exacerbation of chronic illness:    ---------------------------------------------------------------------  B. Risk of Treatment (any 1)   [x] Drugs/treatments that require intensive monitoring for toxicity include:    [] IV ABX requiring serial renal monitoring for nephrotoxicity:     [] IV Narcotic analgesia for adverse drug reaction  [] IV diuresis requiring serial monitoring for renal impairment and electrolyte derangements  [] Critical electrolyte abnormalities requiring IV replacement and close serial monitoring  [x] Insulin - monitoring serial FSBS for Hypoglycemic adverse drug reaction  [] Anticoagulation requiring serial monitoring of coagulation factors  [] Other -   [x] Change in code status:    [] Decision to escalate care:    [] Major surgery/procedure with associated risk factors:    ----------------------------------------------------------------------  C. Data (any 2)  [x] Discussed current management and discharge planning options with Case Management.  [] Discussed management of the case with:    [] Telemetry personally reviewed and interpreted as documented above    [] Imaging personally reviewed and interpreted, includes:    [x] Data Review (any 3)  [x] All available Consultant notes from

## 2024-10-23 NOTE — PROGRESS NOTES
Patient refusing dialysis before discharge today. Messaged Amanda Tomas MD and she requested I check with Nephrology. Message sent to Vanna CEDENO and she stated if the patient understands the risks she may leave without dialysis. Educated patient on the importance of dialysis and patient still refused.

## 2024-10-23 NOTE — PROGRESS NOTES
Granddaughter called RN asking for update on CBC and wants CM to call her. Call to CM agency via phone number left via Katie's note. CM that answered stated that they can't call grand daughter until referral is processed.

## 2024-10-23 NOTE — PROGRESS NOTES
Patient discharged to home with her granddaughter via wheelchair. Patient discharged with all belongings. AVS reviewed with patient and patient verbalized understanding. All questions answered. PIV removed.

## 2024-10-23 NOTE — PLAN OF CARE
Problem: Safety - Adult  Goal: Free from fall injury  10/22/2024 2346 by Mitzy Esquivel, RN  Outcome: Progressing  Pt is a High fall risk.   Explained fall risk precautions to pt and rationale behind their use to keep the patient safe. Belongings are in reach. Pt encouraged to notify staff for any and all assistance. Staff present in tx suite throughout entirety of pts treatment to monitor and protect from falls.  Assistance provided when pivoting to the bedside commode.      Problem: Pain  Goal: Verbalizes/displays adequate comfort level or baseline comfort level  10/22/2024 2346 by Mitzy Esquivel, RN  Outcome: Progressing  Pt had no complaints of pain throughout the shift

## 2024-10-24 ENCOUNTER — TELEPHONE (OUTPATIENT)
Dept: VASCULAR SURGERY | Age: 74
End: 2024-10-24

## 2024-10-24 LAB — ECHO BSA: 1.72 M2

## 2024-10-24 NOTE — CARE COORDINATION
10/24/24 at 12:01pm: Received voicemail from granddaughter/REBEL Larson asking for outpatient palliative care to be arranged. Return call placed and explained this was not mentioned to writer when in the hospital. Explained unable to get this arranged since pt is discharged without an order. Directed her to call the RN through [facility] that saw her today as she can assist with obtaining an order from her PCP and getting this arranged which she stated understanding.     Ting FREITAS, FELICIA   for [facility] ([facility])  [place] Mobile: [phone]

## (undated) DEVICE — GUIDEWIRE VASC L150CM DIA0.035IN STR TIP PTFE FIX COR

## (undated) DEVICE — FOGARTY - HYDRAGRIP SURGICAL - CLAMP INSERTS: Brand: FOGARTY SOFTJAW

## (undated) DEVICE — 20 ML SYRINGE LUER-LOCK TIP: Brand: MONOJECT

## (undated) DEVICE — FIAPC® PROBE W/ FILTER 2200 A OD 3.2MM/9.6FR; L 2.2M/7.2FT: Brand: ERBE

## (undated) DEVICE — PROVE COVER: Brand: UNBRANDED

## (undated) DEVICE — PERCLOSE™ PROSTYLE™ SUTURE-MEDIATED CLOSURE AND REPAIR SYSTEM: Brand: PERCLOSE™ PROSTYLE™

## (undated) DEVICE — CARDINAL HEALTH VESSEL LOOPS MINI RED: Brand: DEVON

## (undated) DEVICE — CLIP RESOLUTION 360

## (undated) DEVICE — SUTURE BOOT: Brand: DEROYAL

## (undated) DEVICE — DECANTER FLD 9IN ST BG FOR ASEP TRNSF OF FLD

## (undated) DEVICE — MERCY FAIRFIELD TURNOVER KIT: Brand: MEDLINE INDUSTRIES, INC.

## (undated) DEVICE — INTENDED TO BE USED TO OCCLUDE, RETRACT AND IDENTIFY ARTERIES, VEINS, TENDONS AND NERVES IN SURGICAL PROCEDURES: Brand: STERION®  VESSEL LOOP

## (undated) DEVICE — GAUZE,SPONGE,4"X4",16PLY,XRAY,STRL,LF: Brand: MEDLINE

## (undated) DEVICE — BLADE ES ELASTOMERIC COAT INSUL DURABLE BEND UPTO 90DEG

## (undated) DEVICE — SYRINGE MED 30ML STD CLR PLAS LUERLOCK TIP N CTRL DISP

## (undated) DEVICE — DRAPE HND W114XL142IN BLU POLYPR W O PCH FEN CRD AND TB HLDR

## (undated) DEVICE — GOWN SIRUS NONREIN XL W/TWL: Brand: MEDLINE INDUSTRIES, INC.

## (undated) DEVICE — CATHETER ANGIO 5FR L65CM 0.038IN VIS OMNI FLUSH-0 SFT TIP

## (undated) DEVICE — LOOP,VESSEL,MAXI,BLUE,2/PK,STERILE: Brand: MEDLINE

## (undated) DEVICE — CANNULA SAMP CO2 AD GRN 7FT CO2 AND 7FT O2 TBNG UNIV CONN

## (undated) DEVICE — GLOVE SURG SZ 7.5 L11.73IN FNGR THK9.8MIL STRW LTX POLYMER

## (undated) DEVICE — ERBE NESSY®PLATE 170 SPLIT; 168CM²; CABLE 3M: Brand: ERBE

## (undated) DEVICE — FIAPC® PROBE W/ FILTER 2200 A OD 2.3MM/6.9FR; L 2.2M/7.2FT: Brand: ERBE

## (undated) DEVICE — TOWEL,OR,DSP,ST,WHITE,DLX,XR,4/PK,20PK/C: Brand: MEDLINE

## (undated) DEVICE — BANDAGE COBAN 4 IN COMPR W4INXL5YD FOAM COHESIVE QUIK STK SELF ADH SFT

## (undated) DEVICE — APPLICATOR PREP 26ML 0.7% IOD POVACRYLEX 74% ISO ALC ST

## (undated) DEVICE — MAJOR SET UP PK

## (undated) DEVICE — SUTURE NONABSORBABLE MONOFILAMENT 7-0 BV-1 1X24 IN PROLENE 8702H

## (undated) DEVICE — BANDAGE COMPR W4INXL5YD WHT BGE POLY COT M E WRP WV HK AND

## (undated) DEVICE — INTENDED FOR TISSUE SEPARATION, AND OTHER PROCEDURES THAT REQUIRE A SHARP SURGICAL BLADE TO PUNCTURE OR CUT.: Brand: BARD-PARKER ® STAINLESS STEEL BLADES

## (undated) DEVICE — SOLUTION IRRIG 1000ML 0.9% SOD CHL USP POUR PLAS BTL

## (undated) DEVICE — SUTURE VICRYL + SZ 3-0 L27IN ABSRB UD L26MM SH 1/2 CIR VCP416H

## (undated) DEVICE — SUTURE PROL SZ 6 0 L24IN NONABSORBABLE BLU C 1 L13MM 3 8 CIR EP8726H

## (undated) DEVICE — LOTION PREP REMV 5OZ IODO CLR TINC OF BENZ DURAPREP

## (undated) DEVICE — SUTURE PERMAHAND SZ 2-0 L12X18IN NONABSORBABLE BLK SILK A185H

## (undated) DEVICE — SUTURE MONOCRYL + SZ 4-0 L27IN ABSRB UD L19MM PS-2 3/8 CIR MCP426H

## (undated) DEVICE — SHEET,DRAPE,53X77,STERILE: Brand: MEDLINE

## (undated) DEVICE — PINNACLE INTRODUCER SHEATH: Brand: PINNACLE

## (undated) DEVICE — Device

## (undated) DEVICE — SUTURE PERMAHAND SZ 4-0 L18IN NONABSORBABLE BLK SILK BRAID A183H

## (undated) DEVICE — EP VASCULAR PACK: Brand: MEDLINE INDUSTRIES, INC.

## (undated) DEVICE — RADIFOCUS GLIDECATH: Brand: GLIDECATH